# Patient Record
Sex: FEMALE | Race: WHITE | HISPANIC OR LATINO | Employment: FULL TIME | URBAN - METROPOLITAN AREA
[De-identification: names, ages, dates, MRNs, and addresses within clinical notes are randomized per-mention and may not be internally consistent; named-entity substitution may affect disease eponyms.]

---

## 2017-02-17 ENCOUNTER — COMMUNICATION - HEALTHEAST (OUTPATIENT)
Dept: FAMILY MEDICINE | Facility: CLINIC | Age: 27
End: 2017-02-17

## 2017-02-25 ENCOUNTER — COMMUNICATION - HEALTHEAST (OUTPATIENT)
Dept: FAMILY MEDICINE | Facility: CLINIC | Age: 27
End: 2017-02-25

## 2017-02-27 ENCOUNTER — COMMUNICATION - HEALTHEAST (OUTPATIENT)
Dept: FAMILY MEDICINE | Facility: CLINIC | Age: 27
End: 2017-02-27

## 2017-02-27 ENCOUNTER — AMBULATORY - HEALTHEAST (OUTPATIENT)
Dept: FAMILY MEDICINE | Facility: CLINIC | Age: 27
End: 2017-02-27

## 2017-02-27 DIAGNOSIS — F98.8 ADD (ATTENTION DEFICIT DISORDER): ICD-10-CM

## 2017-02-27 DIAGNOSIS — Z00.00 HEALTH CARE MAINTENANCE: ICD-10-CM

## 2017-02-28 ENCOUNTER — COMMUNICATION - HEALTHEAST (OUTPATIENT)
Dept: FAMILY MEDICINE | Facility: CLINIC | Age: 27
End: 2017-02-28

## 2017-02-28 DIAGNOSIS — Z00.00 HEALTH CARE MAINTENANCE: ICD-10-CM

## 2017-03-01 ENCOUNTER — AMBULATORY - HEALTHEAST (OUTPATIENT)
Dept: FAMILY MEDICINE | Facility: CLINIC | Age: 27
End: 2017-03-01

## 2017-03-02 ENCOUNTER — OFFICE VISIT - HEALTHEAST (OUTPATIENT)
Dept: BEHAVIORAL HEALTH | Facility: CLINIC | Age: 27
End: 2017-03-02

## 2017-03-02 DIAGNOSIS — Z91.199 FAILURE TO ATTEND APPOINTMENT: ICD-10-CM

## 2017-03-04 ENCOUNTER — RECORDS - HEALTHEAST (OUTPATIENT)
Dept: ADMINISTRATIVE | Facility: OTHER | Age: 27
End: 2017-03-04

## 2017-04-03 ENCOUNTER — COMMUNICATION - HEALTHEAST (OUTPATIENT)
Dept: FAMILY MEDICINE | Facility: CLINIC | Age: 27
End: 2017-04-03

## 2017-04-09 ENCOUNTER — COMMUNICATION - HEALTHEAST (OUTPATIENT)
Dept: SCHEDULING | Facility: CLINIC | Age: 27
End: 2017-04-09

## 2017-04-11 ENCOUNTER — OFFICE VISIT - HEALTHEAST (OUTPATIENT)
Dept: FAMILY MEDICINE | Facility: CLINIC | Age: 27
End: 2017-04-11

## 2017-04-11 DIAGNOSIS — F15.11 NONDEPENDENT AMPHETAMINE OR RELATED ACTING SYMPATHOMIMETIC ABUSE, IN REMISSION (H): ICD-10-CM

## 2017-04-11 DIAGNOSIS — R14.0 ABDOMINAL BLOATING: ICD-10-CM

## 2017-04-11 DIAGNOSIS — R19.7 DIARRHEA: ICD-10-CM

## 2017-04-11 DIAGNOSIS — F98.8 ADD (ATTENTION DEFICIT DISORDER): ICD-10-CM

## 2017-04-11 DIAGNOSIS — N94.9 FEMALE GENITAL SYMPTOMS: ICD-10-CM

## 2017-04-11 DIAGNOSIS — R10.11 RIGHT UPPER QUADRANT ABDOMINAL PAIN: ICD-10-CM

## 2017-04-11 DIAGNOSIS — N92.1 METRORRHAGIA: ICD-10-CM

## 2017-04-11 DIAGNOSIS — Z77.9 EXPOSURE TO POTENTIALLY HARMFUL ENTITY: ICD-10-CM

## 2017-04-12 ENCOUNTER — COMMUNICATION - HEALTHEAST (OUTPATIENT)
Dept: FAMILY MEDICINE | Facility: CLINIC | Age: 27
End: 2017-04-12

## 2017-04-13 LAB
GLIADIN IGA SER-ACNC: 0.4 U/ML
GLIADIN IGG SER-ACNC: <0.4 U/ML
IGA SERPL-MCNC: 138 MG/DL (ref 65–400)
TTG IGA SER-ACNC: 0.2 U/ML
TTG IGG SER-ACNC: 0.8 U/ML

## 2017-04-15 ENCOUNTER — COMMUNICATION - HEALTHEAST (OUTPATIENT)
Dept: FAMILY MEDICINE | Facility: CLINIC | Age: 27
End: 2017-04-15

## 2017-04-15 ENCOUNTER — AMBULATORY - HEALTHEAST (OUTPATIENT)
Dept: FAMILY MEDICINE | Facility: CLINIC | Age: 27
End: 2017-04-15

## 2017-04-19 ENCOUNTER — HOSPITAL ENCOUNTER (OUTPATIENT)
Dept: ULTRASOUND IMAGING | Facility: CLINIC | Age: 27
Discharge: HOME | End: 2017-04-19
Attending: FAMILY MEDICINE

## 2017-04-19 DIAGNOSIS — R14.0 ABDOMINAL BLOATING: ICD-10-CM

## 2017-04-19 DIAGNOSIS — R19.7 DIARRHEA: ICD-10-CM

## 2017-04-21 ENCOUNTER — COMMUNICATION - HEALTHEAST (OUTPATIENT)
Dept: SCHEDULING | Facility: CLINIC | Age: 27
End: 2017-04-21

## 2017-04-24 ENCOUNTER — COMMUNICATION - HEALTHEAST (OUTPATIENT)
Dept: FAMILY MEDICINE | Facility: CLINIC | Age: 27
End: 2017-04-24

## 2017-05-04 ENCOUNTER — COMMUNICATION - HEALTHEAST (OUTPATIENT)
Dept: FAMILY MEDICINE | Facility: CLINIC | Age: 27
End: 2017-05-04

## 2017-05-05 ENCOUNTER — COMMUNICATION - HEALTHEAST (OUTPATIENT)
Dept: FAMILY MEDICINE | Facility: CLINIC | Age: 27
End: 2017-05-05

## 2017-05-08 ENCOUNTER — RECORDS - HEALTHEAST (OUTPATIENT)
Dept: ADMINISTRATIVE | Facility: OTHER | Age: 27
End: 2017-05-08

## 2017-05-09 ENCOUNTER — AMBULATORY - HEALTHEAST (OUTPATIENT)
Dept: FAMILY MEDICINE | Facility: CLINIC | Age: 27
End: 2017-05-09

## 2017-05-14 ENCOUNTER — COMMUNICATION - HEALTHEAST (OUTPATIENT)
Dept: FAMILY MEDICINE | Facility: CLINIC | Age: 27
End: 2017-05-14

## 2017-05-15 ENCOUNTER — AMBULATORY - HEALTHEAST (OUTPATIENT)
Dept: FAMILY MEDICINE | Facility: CLINIC | Age: 27
End: 2017-05-15

## 2017-05-15 ENCOUNTER — COMMUNICATION - HEALTHEAST (OUTPATIENT)
Dept: FAMILY MEDICINE | Facility: CLINIC | Age: 27
End: 2017-05-15

## 2017-05-15 DIAGNOSIS — S99.929A TOE INJURY: ICD-10-CM

## 2017-06-08 ENCOUNTER — OFFICE VISIT - HEALTHEAST (OUTPATIENT)
Dept: BEHAVIORAL HEALTH | Facility: CLINIC | Age: 27
End: 2017-06-08

## 2017-06-08 DIAGNOSIS — F31.9 BIPOLAR DISORDER, UNSPECIFIED (H): ICD-10-CM

## 2017-06-08 DIAGNOSIS — F41.1 GENERALIZED ANXIETY DISORDER: ICD-10-CM

## 2017-06-20 ENCOUNTER — COMMUNICATION - HEALTHEAST (OUTPATIENT)
Dept: FAMILY MEDICINE | Facility: CLINIC | Age: 27
End: 2017-06-20

## 2017-06-20 ENCOUNTER — AMBULATORY - HEALTHEAST (OUTPATIENT)
Dept: FAMILY MEDICINE | Facility: CLINIC | Age: 27
End: 2017-06-20

## 2017-06-22 ENCOUNTER — COMMUNICATION - HEALTHEAST (OUTPATIENT)
Dept: FAMILY MEDICINE | Facility: CLINIC | Age: 27
End: 2017-06-22

## 2017-06-23 ENCOUNTER — COMMUNICATION - HEALTHEAST (OUTPATIENT)
Dept: FAMILY MEDICINE | Facility: CLINIC | Age: 27
End: 2017-06-23

## 2017-06-24 ENCOUNTER — RECORDS - HEALTHEAST (OUTPATIENT)
Dept: ADMINISTRATIVE | Facility: OTHER | Age: 27
End: 2017-06-24

## 2017-06-27 ENCOUNTER — AMBULATORY - HEALTHEAST (OUTPATIENT)
Dept: FAMILY MEDICINE | Facility: CLINIC | Age: 27
End: 2017-06-27

## 2017-07-03 ENCOUNTER — COMMUNICATION - HEALTHEAST (OUTPATIENT)
Dept: FAMILY MEDICINE | Facility: CLINIC | Age: 27
End: 2017-07-03

## 2017-07-06 ENCOUNTER — AMBULATORY - HEALTHEAST (OUTPATIENT)
Dept: FAMILY MEDICINE | Facility: CLINIC | Age: 27
End: 2017-07-06

## 2017-07-11 ENCOUNTER — COMMUNICATION - HEALTHEAST (OUTPATIENT)
Dept: FAMILY MEDICINE | Facility: CLINIC | Age: 27
End: 2017-07-11

## 2017-07-13 ENCOUNTER — COMMUNICATION - HEALTHEAST (OUTPATIENT)
Dept: FAMILY MEDICINE | Facility: CLINIC | Age: 27
End: 2017-07-13

## 2017-07-14 ENCOUNTER — COMMUNICATION - HEALTHEAST (OUTPATIENT)
Dept: FAMILY MEDICINE | Facility: CLINIC | Age: 27
End: 2017-07-14

## 2017-07-15 ENCOUNTER — COMMUNICATION - HEALTHEAST (OUTPATIENT)
Dept: FAMILY MEDICINE | Facility: CLINIC | Age: 27
End: 2017-07-15

## 2017-07-31 ENCOUNTER — RECORDS - HEALTHEAST (OUTPATIENT)
Dept: ADMINISTRATIVE | Facility: OTHER | Age: 27
End: 2017-07-31

## 2017-08-02 ENCOUNTER — COMMUNICATION - HEALTHEAST (OUTPATIENT)
Dept: FAMILY MEDICINE | Facility: CLINIC | Age: 27
End: 2017-08-02

## 2017-08-09 ENCOUNTER — COMMUNICATION - HEALTHEAST (OUTPATIENT)
Dept: FAMILY MEDICINE | Facility: CLINIC | Age: 27
End: 2017-08-09

## 2017-08-11 ENCOUNTER — AMBULATORY - HEALTHEAST (OUTPATIENT)
Dept: FAMILY MEDICINE | Facility: CLINIC | Age: 27
End: 2017-08-11

## 2017-08-14 ENCOUNTER — OFFICE VISIT - HEALTHEAST (OUTPATIENT)
Dept: PODIATRY | Facility: CLINIC | Age: 27
End: 2017-08-14

## 2017-08-14 DIAGNOSIS — L60.0 INGROWN TOENAIL: ICD-10-CM

## 2017-08-25 ENCOUNTER — COMMUNICATION - HEALTHEAST (OUTPATIENT)
Dept: SCHEDULING | Facility: CLINIC | Age: 27
End: 2017-08-25

## 2017-08-28 ENCOUNTER — OFFICE VISIT - HEALTHEAST (OUTPATIENT)
Dept: FAMILY MEDICINE | Facility: CLINIC | Age: 27
End: 2017-08-28

## 2017-08-28 DIAGNOSIS — Z13.9 SCREENING: ICD-10-CM

## 2017-08-28 DIAGNOSIS — B96.89 BV (BACTERIAL VAGINOSIS): ICD-10-CM

## 2017-08-28 DIAGNOSIS — Z20.818 STREP THROAT EXPOSURE: ICD-10-CM

## 2017-08-28 DIAGNOSIS — N76.0 BV (BACTERIAL VAGINOSIS): ICD-10-CM

## 2017-08-28 DIAGNOSIS — R30.0 DYSURIA: ICD-10-CM

## 2017-08-28 LAB — HIV 1+2 AB+HIV1 P24 AG SERPL QL IA: NEGATIVE

## 2017-08-29 LAB — SYPHILIS RPR SCREEN - HISTORICAL: NORMAL

## 2017-08-30 ENCOUNTER — COMMUNICATION - HEALTHEAST (OUTPATIENT)
Dept: SCHEDULING | Facility: CLINIC | Age: 27
End: 2017-08-30

## 2017-08-30 ENCOUNTER — COMMUNICATION - HEALTHEAST (OUTPATIENT)
Dept: HEALTH INFORMATION MANAGEMENT | Facility: CLINIC | Age: 27
End: 2017-08-30

## 2017-08-30 LAB
HAV IGM SERPL QL IA: NEGATIVE
HBV CORE IGM SERPL QL IA: NEGATIVE
HBV SURFACE AG SERPL QL IA: NEGATIVE
HCV AB SERPL QL IA: NEGATIVE

## 2017-09-01 ENCOUNTER — OFFICE VISIT - HEALTHEAST (OUTPATIENT)
Dept: FAMILY MEDICINE | Facility: CLINIC | Age: 27
End: 2017-09-01

## 2017-09-01 DIAGNOSIS — R10.9 ABDOMINAL PAIN: ICD-10-CM

## 2017-09-01 DIAGNOSIS — R30.0 DYSURIA: ICD-10-CM

## 2017-09-01 DIAGNOSIS — J02.9 SORE THROAT: ICD-10-CM

## 2017-09-01 ASSESSMENT — MIFFLIN-ST. JEOR: SCORE: 1165.41

## 2017-09-02 ENCOUNTER — HOSPITAL ENCOUNTER (OUTPATIENT)
Dept: ULTRASOUND IMAGING | Facility: HOSPITAL | Age: 27
Discharge: HOME | End: 2017-09-02
Attending: FAMILY MEDICINE

## 2017-09-02 DIAGNOSIS — R10.9 ABDOMINAL PAIN: ICD-10-CM

## 2017-09-05 ENCOUNTER — COMMUNICATION - HEALTHEAST (OUTPATIENT)
Dept: SCHEDULING | Facility: CLINIC | Age: 27
End: 2017-09-05

## 2017-09-12 ENCOUNTER — COMMUNICATION - HEALTHEAST (OUTPATIENT)
Dept: SCHEDULING | Facility: CLINIC | Age: 27
End: 2017-09-12

## 2017-09-19 ENCOUNTER — OFFICE VISIT - HEALTHEAST (OUTPATIENT)
Dept: FAMILY MEDICINE | Facility: CLINIC | Age: 27
End: 2017-09-19

## 2017-09-19 DIAGNOSIS — R79.89 ABNORMAL TSH: ICD-10-CM

## 2017-09-19 DIAGNOSIS — R93.89 ABNORMAL IMAGING OF THYROID: ICD-10-CM

## 2017-09-19 DIAGNOSIS — K58.0 IRRITABLE BOWEL SYNDROME WITH DIARRHEA: ICD-10-CM

## 2017-09-19 DIAGNOSIS — N92.6 LATE PERIOD: ICD-10-CM

## 2017-09-19 DIAGNOSIS — G44.1 OTHER VASCULAR HEADACHE: ICD-10-CM

## 2017-09-21 ENCOUNTER — COMMUNICATION - HEALTHEAST (OUTPATIENT)
Dept: FAMILY MEDICINE | Facility: CLINIC | Age: 27
End: 2017-09-21

## 2017-09-22 ENCOUNTER — AMBULATORY - HEALTHEAST (OUTPATIENT)
Dept: FAMILY MEDICINE | Facility: CLINIC | Age: 27
End: 2017-09-22

## 2017-09-22 DIAGNOSIS — K58.9 IBS (IRRITABLE BOWEL SYNDROME): ICD-10-CM

## 2017-09-22 DIAGNOSIS — R14.0 BLOATING: ICD-10-CM

## 2017-09-25 ENCOUNTER — RECORDS - HEALTHEAST (OUTPATIENT)
Dept: ADMINISTRATIVE | Facility: OTHER | Age: 27
End: 2017-09-25

## 2017-09-25 LAB
MISCELLANEOUS TEST DEPT. - HE HISTORICAL: NORMAL
PERFORMING LAB: NORMAL
SPECIMEN STATUS: NORMAL
TEST NAME: NORMAL

## 2017-09-26 ENCOUNTER — COMMUNICATION - HEALTHEAST (OUTPATIENT)
Dept: FAMILY MEDICINE | Facility: CLINIC | Age: 27
End: 2017-09-26

## 2017-10-11 ENCOUNTER — OFFICE VISIT - HEALTHEAST (OUTPATIENT)
Dept: BEHAVIORAL HEALTH | Facility: CLINIC | Age: 27
End: 2017-10-11

## 2017-10-11 DIAGNOSIS — Z91.199 FAILURE TO ATTEND APPOINTMENT: ICD-10-CM

## 2017-10-12 ENCOUNTER — COMMUNICATION - HEALTHEAST (OUTPATIENT)
Dept: FAMILY MEDICINE | Facility: CLINIC | Age: 27
End: 2017-10-12

## 2017-10-17 ENCOUNTER — OFFICE VISIT - HEALTHEAST (OUTPATIENT)
Dept: BEHAVIORAL HEALTH | Facility: CLINIC | Age: 27
End: 2017-10-17

## 2017-10-17 ENCOUNTER — AMBULATORY - HEALTHEAST (OUTPATIENT)
Dept: BEHAVIORAL HEALTH | Facility: CLINIC | Age: 27
End: 2017-10-17

## 2017-10-17 DIAGNOSIS — F31.9 BIPOLAR I DISORDER (H): ICD-10-CM

## 2017-10-17 DIAGNOSIS — F41.1 GENERALIZED ANXIETY DISORDER: ICD-10-CM

## 2017-10-18 ENCOUNTER — COMMUNICATION - HEALTHEAST (OUTPATIENT)
Dept: FAMILY MEDICINE | Facility: CLINIC | Age: 27
End: 2017-10-18

## 2017-10-28 ENCOUNTER — COMMUNICATION - HEALTHEAST (OUTPATIENT)
Dept: FAMILY MEDICINE | Facility: CLINIC | Age: 27
End: 2017-10-28

## 2017-11-06 ENCOUNTER — AMBULATORY - HEALTHEAST (OUTPATIENT)
Dept: FAMILY MEDICINE | Facility: CLINIC | Age: 27
End: 2017-11-06

## 2017-11-06 DIAGNOSIS — R10.13 POSTPRANDIAL EPIGASTRIC PAIN: ICD-10-CM

## 2017-11-06 DIAGNOSIS — R14.0 BLOATING: ICD-10-CM

## 2017-11-06 DIAGNOSIS — R11.0 NAUSEA: ICD-10-CM

## 2017-11-20 ENCOUNTER — COMMUNICATION - HEALTHEAST (OUTPATIENT)
Dept: FAMILY MEDICINE | Facility: CLINIC | Age: 27
End: 2017-11-20

## 2017-11-22 ENCOUNTER — COMMUNICATION - HEALTHEAST (OUTPATIENT)
Dept: FAMILY MEDICINE | Facility: CLINIC | Age: 27
End: 2017-11-22

## 2017-12-03 ENCOUNTER — COMMUNICATION - HEALTHEAST (OUTPATIENT)
Dept: FAMILY MEDICINE | Facility: CLINIC | Age: 27
End: 2017-12-03

## 2017-12-04 ENCOUNTER — COMMUNICATION - HEALTHEAST (OUTPATIENT)
Dept: FAMILY MEDICINE | Facility: CLINIC | Age: 27
End: 2017-12-04

## 2017-12-04 ENCOUNTER — OFFICE VISIT - HEALTHEAST (OUTPATIENT)
Dept: FAMILY MEDICINE | Facility: CLINIC | Age: 27
End: 2017-12-04

## 2017-12-04 DIAGNOSIS — O09.91 HIGH-RISK PREGNANCY IN FIRST TRIMESTER: ICD-10-CM

## 2017-12-04 DIAGNOSIS — Z22.330 CARRIER OF GROUP B STREPTOCOCCUS: ICD-10-CM

## 2017-12-04 DIAGNOSIS — Z20.9 EXPOSURE TO POTENTIAL INFECTION: ICD-10-CM

## 2017-12-04 DIAGNOSIS — N91.2 AMENORRHEA: ICD-10-CM

## 2017-12-05 ENCOUNTER — AMBULATORY - HEALTHEAST (OUTPATIENT)
Dept: FAMILY MEDICINE | Facility: CLINIC | Age: 27
End: 2017-12-05

## 2017-12-07 ENCOUNTER — COMMUNICATION - HEALTHEAST (OUTPATIENT)
Dept: FAMILY MEDICINE | Facility: CLINIC | Age: 27
End: 2017-12-07

## 2017-12-08 ENCOUNTER — AMBULATORY - HEALTHEAST (OUTPATIENT)
Dept: LAB | Facility: CLINIC | Age: 27
End: 2017-12-08

## 2017-12-08 ENCOUNTER — COMMUNICATION - HEALTHEAST (OUTPATIENT)
Dept: FAMILY MEDICINE | Facility: CLINIC | Age: 27
End: 2017-12-08

## 2017-12-08 DIAGNOSIS — R14.0 ABDOMINAL BLOATING: ICD-10-CM

## 2017-12-08 DIAGNOSIS — R19.7 DIARRHEA: ICD-10-CM

## 2017-12-08 DIAGNOSIS — N91.2 AMENORRHEA: ICD-10-CM

## 2017-12-11 ENCOUNTER — AMBULATORY - HEALTHEAST (OUTPATIENT)
Dept: FAMILY MEDICINE | Facility: CLINIC | Age: 27
End: 2017-12-11

## 2017-12-11 ENCOUNTER — COMMUNICATION - HEALTHEAST (OUTPATIENT)
Dept: FAMILY MEDICINE | Facility: CLINIC | Age: 27
End: 2017-12-11

## 2017-12-11 DIAGNOSIS — R10.9 ABDOMINAL PAIN: ICD-10-CM

## 2017-12-11 DIAGNOSIS — Z34.90 PREGNANCY: ICD-10-CM

## 2017-12-12 ENCOUNTER — RECORDS - HEALTHEAST (OUTPATIENT)
Dept: ADMINISTRATIVE | Facility: OTHER | Age: 27
End: 2017-12-12

## 2017-12-15 ENCOUNTER — AMBULATORY - HEALTHEAST (OUTPATIENT)
Dept: LAB | Facility: CLINIC | Age: 27
End: 2017-12-15

## 2017-12-15 DIAGNOSIS — Z34.90 PREGNANCY: ICD-10-CM

## 2017-12-15 DIAGNOSIS — R10.9 ABDOMINAL PAIN: ICD-10-CM

## 2017-12-16 ENCOUNTER — COMMUNICATION - HEALTHEAST (OUTPATIENT)
Dept: FAMILY MEDICINE | Facility: CLINIC | Age: 27
End: 2017-12-16

## 2017-12-18 ENCOUNTER — COMMUNICATION - HEALTHEAST (OUTPATIENT)
Dept: FAMILY MEDICINE | Facility: CLINIC | Age: 27
End: 2017-12-18

## 2017-12-18 ENCOUNTER — HOSPITAL ENCOUNTER (OUTPATIENT)
Dept: ULTRASOUND IMAGING | Facility: CLINIC | Age: 27
Discharge: HOME | End: 2017-12-18
Attending: FAMILY MEDICINE

## 2017-12-18 DIAGNOSIS — R10.9 ABDOMINAL PAIN: ICD-10-CM

## 2017-12-18 DIAGNOSIS — Z34.90 PREGNANCY: ICD-10-CM

## 2017-12-20 ENCOUNTER — OFFICE VISIT - HEALTHEAST (OUTPATIENT)
Dept: FAMILY MEDICINE | Facility: CLINIC | Age: 27
End: 2017-12-20

## 2017-12-20 ENCOUNTER — COMMUNICATION - HEALTHEAST (OUTPATIENT)
Dept: FAMILY MEDICINE | Facility: CLINIC | Age: 27
End: 2017-12-20

## 2017-12-20 DIAGNOSIS — Z3A.01 LESS THAN 8 WEEKS GESTATION OF PREGNANCY: ICD-10-CM

## 2017-12-20 DIAGNOSIS — R30.9 PAIN WITH URINATION: ICD-10-CM

## 2017-12-20 DIAGNOSIS — N89.8 VAGINAL DISCHARGE: ICD-10-CM

## 2017-12-22 ENCOUNTER — COMMUNICATION - HEALTHEAST (OUTPATIENT)
Dept: FAMILY MEDICINE | Facility: CLINIC | Age: 27
End: 2017-12-22

## 2017-12-22 ENCOUNTER — COMMUNICATION - HEALTHEAST (OUTPATIENT)
Dept: SCHEDULING | Facility: CLINIC | Age: 27
End: 2017-12-22

## 2017-12-28 ENCOUNTER — OFFICE VISIT - HEALTHEAST (OUTPATIENT)
Dept: FAMILY MEDICINE | Facility: CLINIC | Age: 27
End: 2017-12-28

## 2017-12-28 DIAGNOSIS — R30.0 DYSURIA: ICD-10-CM

## 2017-12-28 DIAGNOSIS — O21.0 HYPEREMESIS GRAVIDARUM: ICD-10-CM

## 2017-12-28 ASSESSMENT — MIFFLIN-ST. JEOR: SCORE: 1172.21

## 2017-12-29 ENCOUNTER — COMMUNICATION - HEALTHEAST (OUTPATIENT)
Dept: FAMILY MEDICINE | Facility: CLINIC | Age: 27
End: 2017-12-29

## 2018-01-01 ENCOUNTER — APPOINTMENT (OUTPATIENT)
Dept: LAB | Facility: CLINIC | Age: 28
End: 2018-01-01
Attending: FAMILY MEDICINE
Payer: COMMERCIAL

## 2018-01-02 ENCOUNTER — COMMUNICATION - HEALTHEAST (OUTPATIENT)
Dept: FAMILY MEDICINE | Facility: CLINIC | Age: 28
End: 2018-01-02

## 2018-01-03 ENCOUNTER — RECORDS - HEALTHEAST (OUTPATIENT)
Dept: ADMINISTRATIVE | Facility: OTHER | Age: 28
End: 2018-01-03

## 2018-01-05 ENCOUNTER — COMMUNICATION - HEALTHEAST (OUTPATIENT)
Dept: FAMILY MEDICINE | Facility: CLINIC | Age: 28
End: 2018-01-05

## 2018-01-08 ENCOUNTER — AMBULATORY - HEALTHEAST (OUTPATIENT)
Dept: FAMILY MEDICINE | Facility: CLINIC | Age: 28
End: 2018-01-08

## 2018-01-10 ENCOUNTER — COMMUNICATION - HEALTHEAST (OUTPATIENT)
Dept: FAMILY MEDICINE | Facility: CLINIC | Age: 28
End: 2018-01-10

## 2018-01-11 ENCOUNTER — OFFICE VISIT - HEALTHEAST (OUTPATIENT)
Dept: FAMILY MEDICINE | Facility: CLINIC | Age: 28
End: 2018-01-11

## 2018-01-11 ENCOUNTER — COMMUNICATION - HEALTHEAST (OUTPATIENT)
Dept: FAMILY MEDICINE | Facility: CLINIC | Age: 28
End: 2018-01-11

## 2018-01-11 DIAGNOSIS — Z3A.09 9 WEEKS GESTATION OF PREGNANCY: ICD-10-CM

## 2018-01-11 DIAGNOSIS — J02.9 PHARYNGITIS: ICD-10-CM

## 2018-01-11 DIAGNOSIS — R07.0 THROAT PAIN: ICD-10-CM

## 2018-01-11 DIAGNOSIS — Z20.818 EXPOSURE TO STREP THROAT: ICD-10-CM

## 2018-01-11 DIAGNOSIS — Z20.828 EXPOSURE TO INFLUENZA: ICD-10-CM

## 2018-01-11 LAB — DEPRECATED S PYO AG THROAT QL EIA: NORMAL

## 2018-01-12 ENCOUNTER — COMMUNICATION - HEALTHEAST (OUTPATIENT)
Dept: FAMILY MEDICINE | Facility: CLINIC | Age: 28
End: 2018-01-12

## 2018-01-12 ENCOUNTER — RECORDS - HEALTHEAST (OUTPATIENT)
Dept: ADMINISTRATIVE | Facility: OTHER | Age: 28
End: 2018-01-12

## 2018-01-12 LAB — GROUP A STREP BY PCR: NORMAL

## 2018-01-16 ENCOUNTER — RECORDS - HEALTHEAST (OUTPATIENT)
Dept: ADMINISTRATIVE | Facility: OTHER | Age: 28
End: 2018-01-16

## 2018-01-18 ENCOUNTER — COMMUNICATION - HEALTHEAST (OUTPATIENT)
Dept: FAMILY MEDICINE | Facility: CLINIC | Age: 28
End: 2018-01-18

## 2018-01-18 ENCOUNTER — AMBULATORY - HEALTHEAST (OUTPATIENT)
Dept: FAMILY MEDICINE | Facility: CLINIC | Age: 28
End: 2018-01-18

## 2018-01-18 DIAGNOSIS — O09.92 HIGH-RISK PREGNANCY IN SECOND TRIMESTER: ICD-10-CM

## 2018-01-26 ENCOUNTER — COMMUNICATION - HEALTHEAST (OUTPATIENT)
Dept: FAMILY MEDICINE | Facility: CLINIC | Age: 28
End: 2018-01-26

## 2018-01-30 ENCOUNTER — RECORDS - HEALTHEAST (OUTPATIENT)
Dept: ADMINISTRATIVE | Facility: OTHER | Age: 28
End: 2018-01-30

## 2018-01-31 ENCOUNTER — COMMUNICATION - HEALTHEAST (OUTPATIENT)
Dept: FAMILY MEDICINE | Facility: CLINIC | Age: 28
End: 2018-01-31

## 2018-01-31 ENCOUNTER — COMMUNICATION - HEALTHEAST (OUTPATIENT)
Dept: SCHEDULING | Facility: CLINIC | Age: 28
End: 2018-01-31

## 2018-02-01 ENCOUNTER — COMMUNICATION - HEALTHEAST (OUTPATIENT)
Dept: FAMILY MEDICINE | Facility: CLINIC | Age: 28
End: 2018-02-01

## 2018-02-02 ENCOUNTER — COMMUNICATION - HEALTHEAST (OUTPATIENT)
Dept: FAMILY MEDICINE | Facility: CLINIC | Age: 28
End: 2018-02-02

## 2018-02-04 ENCOUNTER — COMMUNICATION - HEALTHEAST (OUTPATIENT)
Dept: FAMILY MEDICINE | Facility: CLINIC | Age: 28
End: 2018-02-04

## 2018-02-05 ENCOUNTER — RECORDS - HEALTHEAST (OUTPATIENT)
Dept: ADMINISTRATIVE | Facility: OTHER | Age: 28
End: 2018-02-05

## 2018-02-05 ENCOUNTER — COMMUNICATION - HEALTHEAST (OUTPATIENT)
Dept: FAMILY MEDICINE | Facility: CLINIC | Age: 28
End: 2018-02-05

## 2018-02-05 DIAGNOSIS — Z34.90 PREGNANCY: ICD-10-CM

## 2018-02-06 ENCOUNTER — COMMUNICATION - HEALTHEAST (OUTPATIENT)
Dept: FAMILY MEDICINE | Facility: CLINIC | Age: 28
End: 2018-02-06

## 2018-02-09 ENCOUNTER — PRENATAL OFFICE VISIT - HEALTHEAST (OUTPATIENT)
Dept: FAMILY MEDICINE | Facility: CLINIC | Age: 28
End: 2018-02-09

## 2018-02-09 DIAGNOSIS — O09.892 SUPERVISION OF OTHER HIGH RISK PREGNANCIES, SECOND TRIMESTER: ICD-10-CM

## 2018-02-09 DIAGNOSIS — B37.31 CANDIDA VAGINITIS: ICD-10-CM

## 2018-02-09 LAB
ALBUMIN UR-MCNC: NEGATIVE MG/DL
AMPHETAMINES UR QL SCN: ABNORMAL
APPEARANCE UR: CLEAR
BARBITURATES UR QL: ABNORMAL
BASOPHILS # BLD AUTO: 0 THOU/UL (ref 0–0.2)
BASOPHILS NFR BLD AUTO: 1 % (ref 0–2)
BENZODIAZ UR QL: ABNORMAL
BILIRUB UR QL STRIP: NEGATIVE
CANNABINOIDS UR QL SCN: ABNORMAL
COCAINE UR QL: ABNORMAL
COLOR UR AUTO: YELLOW
CREAT UR-MCNC: 120.3 MG/DL
EOSINOPHIL # BLD AUTO: 0.1 THOU/UL (ref 0–0.4)
EOSINOPHIL NFR BLD AUTO: 1 % (ref 0–6)
ERYTHROCYTE [DISTWIDTH] IN BLOOD BY AUTOMATED COUNT: 14 % (ref 11–14.5)
GLUCOSE UR STRIP-MCNC: NEGATIVE MG/DL
HCT VFR BLD AUTO: 35.7 % (ref 35–47)
HGB BLD-MCNC: 12.2 G/DL (ref 12–16)
HGB UR QL STRIP: ABNORMAL
HIV 1+2 AB+HIV1 P24 AG SERPL QL IA: NEGATIVE
KETONES UR STRIP-MCNC: NEGATIVE MG/DL
LEUKOCYTE ESTERASE UR QL STRIP: NEGATIVE
LYMPHOCYTES # BLD AUTO: 1.9 THOU/UL (ref 0.8–4.4)
LYMPHOCYTES NFR BLD AUTO: 28 % (ref 20–40)
MCH RBC QN AUTO: 31 PG (ref 27–34)
MCHC RBC AUTO-ENTMCNC: 34.2 G/DL (ref 32–36)
MCV RBC AUTO: 91 FL (ref 80–100)
MONOCYTES # BLD AUTO: 0.4 THOU/UL (ref 0–0.9)
MONOCYTES NFR BLD AUTO: 7 % (ref 2–10)
NEUTROPHILS # BLD AUTO: 4.2 THOU/UL (ref 2–7.7)
NEUTROPHILS NFR BLD AUTO: 63 % (ref 50–70)
NITRATE UR QL: NEGATIVE
OPIATES UR QL SCN: ABNORMAL
OXYCODONE UR QL: ABNORMAL
PCP UR QL SCN: ABNORMAL
PH UR STRIP: 5.5 [PH] (ref 4.5–8)
PLATELET # BLD AUTO: 285 THOU/UL (ref 140–440)
PMV BLD AUTO: 9.9 FL (ref 8.5–12.5)
RBC # BLD AUTO: 3.94 MILL/UL (ref 3.8–5.4)
SP GR UR STRIP: 1.02 (ref 1–1.03)
UROBILINOGEN UR STRIP-ACNC: ABNORMAL
WBC: 6.6 THOU/UL (ref 4–11)

## 2018-02-10 LAB
ANTIBODY SCREEN: NEGATIVE
BACTERIA SPEC CULT: NO GROWTH
CLUE CELLS: ABNORMAL
HBV SURFACE AG SERPL QL IA: NEGATIVE
TRICHOMONAS, WET PREP: ABNORMAL
YEAST, WET PREP: ABNORMAL

## 2018-02-12 ENCOUNTER — COMMUNICATION - HEALTHEAST (OUTPATIENT)
Dept: SCHEDULING | Facility: CLINIC | Age: 28
End: 2018-02-12

## 2018-02-12 LAB
ABO/RH(D): NORMAL
ABORH REPEAT: NORMAL
C TRACH DNA SPEC QL PROBE+SIG AMP: NEGATIVE
HPV SOURCE: ABNORMAL
HUMAN PAPILLOMA VIRUS 16 DNA: NEGATIVE
HUMAN PAPILLOMA VIRUS 18 DNA: NEGATIVE
HUMAN PAPILLOMA VIRUS FINAL DIAGNOSIS: ABNORMAL
HUMAN PAPILLOMA VIRUS OTHER HR: POSITIVE
N GONORRHOEA DNA SPEC QL NAA+PROBE: NEGATIVE
RUBV IGG SERPL QL IA: NORMAL
SPECIMEN DESCRIPTION: ABNORMAL
SYPHILIS RPR SCREEN - HISTORICAL: NORMAL

## 2018-02-16 ENCOUNTER — COMMUNICATION - HEALTHEAST (OUTPATIENT)
Dept: FAMILY MEDICINE | Facility: CLINIC | Age: 28
End: 2018-02-16

## 2018-02-19 LAB
BKR LAB AP ABNORMAL BLEEDING: NO
BKR LAB AP BIRTH CONTROL/HORMONES: ABNORMAL
BKR LAB AP CERVICAL APPEARANCE: NORMAL
BKR LAB AP GYN ADEQUACY: ABNORMAL
BKR LAB AP GYN INTERPRETATION: ABNORMAL
BKR LAB AP HPV REFLEX: ABNORMAL
BKR LAB AP LMP: ABNORMAL
BKR LAB AP PATIENT STATUS: ABNORMAL
BKR LAB AP PREVIOUS ABNORMAL: ABNORMAL
BKR LAB AP PREVIOUS NORMAL: 2012
HIGH RISK?: YES
PATH REPORT.COMMENTS IMP SPEC: ABNORMAL
RESULT FLAG (HE HISTORICAL CONVERSION): ABNORMAL

## 2018-03-01 ENCOUNTER — RECORDS - HEALTHEAST (OUTPATIENT)
Dept: ADMINISTRATIVE | Facility: OTHER | Age: 28
End: 2018-03-01

## 2018-03-02 ENCOUNTER — OFFICE VISIT - HEALTHEAST (OUTPATIENT)
Dept: FAMILY MEDICINE | Facility: CLINIC | Age: 28
End: 2018-03-02

## 2018-03-02 DIAGNOSIS — R50.9 FEVER: ICD-10-CM

## 2018-03-02 DIAGNOSIS — R07.0 THROAT PAIN: ICD-10-CM

## 2018-03-02 DIAGNOSIS — Z20.818 EXPOSURE TO STREP THROAT: ICD-10-CM

## 2018-03-02 DIAGNOSIS — J02.9 PHARYNGITIS: ICD-10-CM

## 2018-03-02 LAB — DEPRECATED S PYO AG THROAT QL EIA: NORMAL

## 2018-03-03 LAB — GROUP A STREP BY PCR: NORMAL

## 2018-03-05 ENCOUNTER — COMMUNICATION - HEALTHEAST (OUTPATIENT)
Dept: FAMILY MEDICINE | Facility: CLINIC | Age: 28
End: 2018-03-05

## 2018-03-07 DIAGNOSIS — H53.10 SUBJECTIVE VISUAL DISTURBANCE: Primary | ICD-10-CM

## 2018-03-08 ENCOUNTER — HOSPITAL ENCOUNTER (OUTPATIENT)
Dept: OBGYN | Facility: HOSPITAL | Age: 28
Discharge: HOME | End: 2018-03-08
Attending: OBSTETRICS & GYNECOLOGY | Admitting: OBSTETRICS & GYNECOLOGY

## 2018-03-08 LAB
ALBUMIN UR-MCNC: NEGATIVE MG/DL
CLUE CELLS: NORMAL
GLUCOSE UR STRIP-MCNC: NEGATIVE MG/DL
KETONES UR STRIP-MCNC: NEGATIVE MG/DL
RUPTURE OF FETAL MEMBRANES BY ROM PLUS: NEGATIVE
TRICHOMONAS, WET PREP: NORMAL
YEAST, WET PREP: NORMAL

## 2018-03-08 ASSESSMENT — MIFFLIN-ST. JEOR: SCORE: 1215.3

## 2018-03-11 ENCOUNTER — COMMUNICATION - HEALTHEAST (OUTPATIENT)
Dept: SCHEDULING | Facility: CLINIC | Age: 28
End: 2018-03-11

## 2018-03-12 ENCOUNTER — OFFICE VISIT (OUTPATIENT)
Dept: OPHTHALMOLOGY | Facility: CLINIC | Age: 28
End: 2018-03-12
Attending: OPHTHALMOLOGY
Payer: COMMERCIAL

## 2018-03-12 ENCOUNTER — RECORDS - HEALTHEAST (OUTPATIENT)
Dept: ADMINISTRATIVE | Facility: OTHER | Age: 28
End: 2018-03-12

## 2018-03-12 DIAGNOSIS — G43.109 MIGRAINE WITH AURA AND WITHOUT STATUS MIGRAINOSUS, NOT INTRACTABLE: ICD-10-CM

## 2018-03-12 DIAGNOSIS — H53.10 SUBJECTIVE VISUAL DISTURBANCE: ICD-10-CM

## 2018-03-12 DIAGNOSIS — H53.129 TRANSIENT VISUAL LOSS, UNSPECIFIED LATERALITY: Primary | ICD-10-CM

## 2018-03-12 PROCEDURE — G0463 HOSPITAL OUTPT CLINIC VISIT: HCPCS | Mod: ZF

## 2018-03-12 PROCEDURE — 92083 EXTENDED VISUAL FIELD XM: CPT | Mod: ZF | Performed by: OPHTHALMOLOGY

## 2018-03-12 RX ORDER — ERGOCALCIFEROL 1.25 MG/1
CAPSULE, LIQUID FILLED ORAL
COMMUNITY
Start: 2016-10-06 | End: 2021-08-15

## 2018-03-12 RX ORDER — FOLIC ACID 1 MG/1
1 TABLET ORAL
COMMUNITY
Start: 2016-09-29 | End: 2021-08-15

## 2018-03-12 RX ORDER — PYRIDOXINE HCL (VITAMIN B6) 25 MG
25 TABLET ORAL
COMMUNITY
End: 2023-09-07

## 2018-03-12 ASSESSMENT — REFRACTION_WEARINGRX
SPECS_TYPE: SVL
OS_CYLINDER: +1.50
OD_AXIS: 078
OS_AXIS: 102
OS_SPHERE: -2.00
OD_SPHERE: -1.50
OD_CYLINDER: +1.00

## 2018-03-12 ASSESSMENT — CUP TO DISC RATIO
OS_RATIO: 0.3
OD_RATIO: 0.3

## 2018-03-12 ASSESSMENT — CONF VISUAL FIELD
OS_NORMAL: 1
OD_NORMAL: 1

## 2018-03-12 ASSESSMENT — VISUAL ACUITY
OD_CC: 20/20
OS_CC+: -1
METHOD: SNELLEN - LINEAR
OS_CC: 20/25
CORRECTION_TYPE: GLASSES

## 2018-03-12 ASSESSMENT — TONOMETRY
OD_IOP_MMHG: 11
IOP_METHOD: TONOPEN
OS_IOP_MMHG: 10

## 2018-03-12 ASSESSMENT — EXTERNAL EXAM - LEFT EYE: OS_EXAM: NORMAL

## 2018-03-12 ASSESSMENT — SLIT LAMP EXAM - LIDS
COMMENTS: NORMAL
COMMENTS: NORMAL

## 2018-03-12 ASSESSMENT — EXTERNAL EXAM - RIGHT EYE: OD_EXAM: NORMAL

## 2018-03-12 NOTE — NURSING NOTE
Chief Complaints and History of Present Illnesses   Patient presents with     Follow Up For     Subjective visual disturbance (Primary Dx)     HPI    Affected eye(s):  Both   Symptoms:     Blurred vision   Decreased vision   Distorted vision   No double vision   No floaters   No flashes      Duration:  7 years   Frequency:  Constant       Do you have eye pain now?:  No      Comments:  Pt stated blurry and distorted vision along with headaches 5 X weekly, no double vision. She stated lights and smells can trigger vision lose followed by migraine over the last 2 years.  Nir Curry  1:04 PM March 12, 2018

## 2018-03-12 NOTE — PATIENT INSTRUCTIONS
You are sensitive to the light.  There is generally no known cause for this.  There is a specialized tint called FL-41 that blocks a certain wavelength of light known to cause light sensitivity. Your eyeglass lenses can be tinted with this at just about any eyeglass store but not all stores carry this tint.  You should call before visiting the store.  The store may try to substitute with a tint that they have in stock, but I would recommend against it.  There is also a company that will sell FL-41 tinted lenses online at [employer] or [employer].      Generally speaking, when you are at home, try not to wear sunglasses inside, especially the evenings.  The more you wear them, the less tolerant of light you become.  This does not apply to the FL-41 lenses since they are not that dark.  There are also smart LED light bulbs that can be controlled from your smart phone.  They can be made any color and any brightness.  You may find that there is a particular color and brightness that helps you.      There are tinted contact lenses.  These can be made using the FL-41 tint or another color.  They are very custom made and so they tend to cost hundreds of dollars.      You can also consider wearing a hat, tinting your windshield, get a shield above your cubicle, tinting the windows in your home and change the bulbs in your office.

## 2018-03-12 NOTE — PROGRESS NOTES
Assessment & Plan     Tara Sorenson is a 28 year old female with the following diagnoses:   1. Transient visual loss, unspecified laterality    2. Subjective visual disturbance    3. Migraine with aura and without status migrainosus, not intractable         Sent by [employer]. She got new glasses recently.  When she put them on, she noticed that she got a migraine.  She thinks that he vision in the LEFT eye is not quite right with the glasses.  She gets migraine with aura when she looks at light or is around certain smells.  Sunlight and fluorescent lights.  She has blackout shades at home.  She turns down all the lights in her house.  She cannot look at computer screens because of this as well.      The patient complains of photophobia. The rest of the eye examination does not explain the cause of the patient's photophobia.  The etiology of this is not well understood.  No further diagnostic tests are necessary.   I would recommend that the patient try FL-41 tinted lenses.  There are a number of frames that seem to benefit patient's with photophobia.    She can consider a smart lightbulb where one can change the color and brightness using a smartphone.  She can consider placing tint on her car and windows in he rhouse.  If that does not benefit the patient, then the patient could consider tinted contact lenses.      We also discussed prophylactic medications for her migraine.  She has not tried topiramate or botulinum toxin.  She is currently pregnant and these are contraindicated.  Finally she is concerned that the prescription is not correct in her left eye.  I told her that while she is pregnant I would not change her last glasses.  I suggested that she could return in about 6 months after she delivers.              Attending Physician Attestation:  Complete documentation of historical and exam elements from today's encounter can be found in the full encounter summary report (not reduplicated  in this progress note).  I personally obtained the chief complaint(s) and history of present illness.  I confirmed and edited as necessary the review of systems, past medical/surgical history, family history, social history, and examination findings as documented by others; and I examined the patient myself.  I personally reviewed the relevant tests, images, and reports as documented above.  I formulated and edited as necessary the assessment and plan and discussed the findings and management plan with the patient and family. - Luke Traore MD

## 2018-03-12 NOTE — LETTER
3/12/2018         RE:  :  MRN: Tara Sorenson  1990  [id number]     Dear Dr. Borck:     Thank you for asking me to see your very pleasant patient, Tara Sorenson, in neuro-ophthalmic consultation.  I would like to thank you for sending your records and I have summarized them in the history of present illness.  My assessment and plan are below.  For further details, please see my attached clinic note.          Assessment & Plan     Tara Sorenson is a 28 year old female with the following diagnoses:   1. Transient visual loss, unspecified laterality    2. Subjective visual disturbance    3. Migraine with aura and without status migrainosus, not intractable         Sent by [employer]. She got new glasses recently.  When she put them on, she noticed that she got a migraine.  She thinks that he vision in the LEFT eye is not quite right with the glasses.  She gets migraine with aura when she looks at light or is around certain smells.  Sunlight and fluorescent lights.  She has blackout shades at home.  She turns down all the lights in her house.  She cannot look at computer screens because of this as well.      The patient complains of photophobia. The rest of the eye examination does not explain the cause of the patient's photophobia.  The etiology of this is not well understood.  No further diagnostic tests are necessary.   I would recommend that the patient try FL-41 tinted lenses.  There are a number of frames that seem to benefit patient's with photophobia.    She can consider a smart lightbulb where one can change the color and brightness using a smartphone.  She can consider placing tint on her car and windows in he rhouse.  If that does not benefit the patient, then the patient could consider tinted contact lenses.      We also discussed prophylactic medications for her migraine.  She has not tried topiramate or botulinum toxin.  She is currently pregnant and these are contraindicated.   Finally she is concerned that the prescription is not correct in her left eye.  I told her that while she is pregnant I would not change her last glasses.  I suggested that she could return in about 6 months after she delivers.             Again, thank you for allowing me to participate in the care of your patient.      Sincerely,    Luke Traore MD  Professor, Neuro-Ophthalmology  Department of Ophthalmology and Visual Neurosciences  [facility]    CC: Joshua Dumont MD  [address] [address]  VIA Facsimile: [phone]     Alfonso Brock OD  [facility]  [address] [place] [address]  VIA Facsimile:  [phone]

## 2018-03-12 NOTE — MR AVS SNAPSHOT
After Visit Summary   3/12/2018    Tara Sorenson    MRN: [id number]           Patient Information     Date Of Birth          1990        Visit Information        Provider Department      3/12/2018 12:30 PM [facility]ore [facility]        Today's Diagnoses     Subjective visual disturbance          Care Instructions    You are sensitive to the light.  There is generally no known cause for this.  There is a specialized tint called FL-41 that blocks a certain wavelength of light known to cause light sensitivity. Your eyeglass lenses can be tinted with this at just about any eyeglass store but not all stores carry this tint.  You should call before visiting the store.  The store may try to substitute with a tint that they have in stock, but I would recommend against it.  There is also a company that will sell FL-41 tinted lenses online at [employer] or [employer].      Generally speaking, when you are at home, try not to wear sunglasses inside, especially the evenings.  The more you wear them, the less tolerant of light you become.  This does not apply to the FL-41 lenses since they are not that dark.  There are also smart LED light bulbs that can be controlled from your smart phone.  They can be made any color and any brightness.  You may find that there is a particular color and brightness that helps you.      There are tinted contact lenses.  These can be made using the FL-41 tint or another color.  They are very custom made and so they tend to cost hundreds of dollars.      You can also consider wearing a hat, tinting your windshield, get a shield above your cubicle, tinting the windows in your home and change the bulbs in your office.                Follow-ups after your visit        Who to contact     Please call your clinic at [phone] to:    Ask questions about your health    Make or cancel appointments    Discuss your medicines    Learn about your test  results    Speak to your doctor            Additional Information About Your Visit        [facility] Information     Getaroundt is an electronic gateway that provides easy, online access to your medical records. With OB10, you can request a clinic appointment, read your test results, renew a prescription or communicate with your care team.     To sign up for OB10 visit the website at [email or web address]   You will be asked to enter the access code listed below, as well as some personal information. Please follow the directions to create your username and password.     Your access code is: [email or web address]  Expires: 2018  7:30 AM     Your access code will  in 90 days. If you need help or a new code, please contact your [facility] or call [phone] for assistance.        Care EveryWhere ID     This is your Care EveryWhere ID. This could be used by other organizations to access your [place] medical records  [id number]         Blood Pressure from Last 3 Encounters:   12/15/14 100/64   11 106/58    Weight from Last 3 Encounters:   12/15/14 49 kg (108 lb)   11 63.7 kg (140 lb 6.4 oz)              We Performed the Following     Glaucoma Top         Primary Care Provider Office Phone # Fax #    Joshua Dumont -[phone]       [address] [address]        Equal Access to Services     [facility]: Hadii aad ku hadasho Soomaali, waaxda luqadaha, qaybta kaalmada adeegyada, ayanna barron hayamada lehman . So [facility] [phone].    ATENCIÓN: Si habla español, tiene a obrien disposición servicios gratuitos de asistencia lingüística. Llame al [phone].    We comply with applicable federal civil rights laws and [place] laws. We do not discriminate on the basis of race, color, national origin, age, disability, sex, sexual orientation, or gender identity.            Thank you!     Thank you for choosing  [facility]  for your care. Our goal is always to provide you with excellent care. Hearing back from our patients is one way we can continue to improve our services. Please take a few minutes to complete the written survey that you may receive in the mail after your visit with us. Thank you!             Your Updated Medication List - Protect others around you: Learn how to safely use, store and throw away your medicines at www.disposemymeds.org.          This list is accurate as of 3/12/18  2:29 PM.  Always use your most recent med list.                   Brand Name Dispense Instructions for use Diagnosis    [employer] PRENATAL 28-0.8 MG Tabs      Take 1 tablet by mouth        folic acid 1 MG tablet    FOLVITE     Take 1 mg by mouth        NO ACTIVE MEDICATIONS           PRENATAL/IRON Tabs      Take  by mouth.        pyridOXINE 25 MG tablet    vitamin B-6     Take 25 mg by mouth        vitamin D 46653 UNIT capsule    ERGOCALCIFEROL     TAKE ONE CAPSULE BY MOUTH ONCE A WEEK ON SUNDAYS.

## 2018-03-15 ENCOUNTER — COMMUNICATION - HEALTHEAST (OUTPATIENT)
Dept: FAMILY MEDICINE | Facility: CLINIC | Age: 28
End: 2018-03-15

## 2018-04-16 ENCOUNTER — COMMUNICATION - HEALTHEAST (OUTPATIENT)
Dept: FAMILY MEDICINE | Facility: CLINIC | Age: 28
End: 2018-04-16

## 2018-04-27 ENCOUNTER — AMBULATORY - HEALTHEAST (OUTPATIENT)
Dept: FAMILY MEDICINE | Facility: CLINIC | Age: 28
End: 2018-04-27

## 2018-04-27 ENCOUNTER — COMMUNICATION - HEALTHEAST (OUTPATIENT)
Dept: FAMILY MEDICINE | Facility: CLINIC | Age: 28
End: 2018-04-27

## 2018-05-25 ENCOUNTER — RECORDS - HEALTHEAST (OUTPATIENT)
Dept: ADMINISTRATIVE | Facility: OTHER | Age: 28
End: 2018-05-25

## 2018-08-12 ENCOUNTER — RECORDS - HEALTHEAST (OUTPATIENT)
Dept: ADMINISTRATIVE | Facility: OTHER | Age: 28
End: 2018-08-12

## 2018-08-13 ENCOUNTER — RECORDS - HEALTHEAST (OUTPATIENT)
Dept: ADMINISTRATIVE | Facility: OTHER | Age: 28
End: 2018-08-13

## 2018-08-14 ENCOUNTER — RECORDS - HEALTHEAST (OUTPATIENT)
Dept: ADMINISTRATIVE | Facility: OTHER | Age: 28
End: 2018-08-14

## 2018-08-19 ENCOUNTER — COMMUNICATION - HEALTHEAST (OUTPATIENT)
Dept: SCHEDULING | Facility: CLINIC | Age: 28
End: 2018-08-19

## 2018-08-26 ENCOUNTER — COMMUNICATION - HEALTHEAST (OUTPATIENT)
Dept: SCHEDULING | Facility: CLINIC | Age: 28
End: 2018-08-26

## 2018-08-29 ENCOUNTER — COMMUNICATION - HEALTHEAST (OUTPATIENT)
Dept: CARE COORDINATION | Facility: CLINIC | Age: 28
End: 2018-08-29

## 2018-09-02 ENCOUNTER — COMMUNICATION - HEALTHEAST (OUTPATIENT)
Dept: SCHEDULING | Facility: CLINIC | Age: 28
End: 2018-09-02

## 2018-09-04 ENCOUNTER — OFFICE VISIT - HEALTHEAST (OUTPATIENT)
Dept: FAMILY MEDICINE | Facility: CLINIC | Age: 28
End: 2018-09-04

## 2018-09-04 DIAGNOSIS — M50.90 CERVICAL DISC DISORDER: ICD-10-CM

## 2018-09-04 DIAGNOSIS — R20.2 PARESTHESIA: ICD-10-CM

## 2018-09-04 DIAGNOSIS — G43.119 INTRACTABLE MIGRAINE WITH AURA WITHOUT STATUS MIGRAINOSUS: ICD-10-CM

## 2018-09-04 DIAGNOSIS — B37.31 YEAST VAGINITIS: ICD-10-CM

## 2018-09-26 ENCOUNTER — RECORDS - HEALTHEAST (OUTPATIENT)
Dept: ADMINISTRATIVE | Facility: OTHER | Age: 28
End: 2018-09-26

## 2018-09-27 ENCOUNTER — HOSPITAL ENCOUNTER (OUTPATIENT)
Dept: CT IMAGING | Facility: HOSPITAL | Age: 28
Discharge: HOME | End: 2018-09-27
Attending: OBSTETRICS & GYNECOLOGY

## 2018-09-27 DIAGNOSIS — R10.2 PELVIC PAIN: ICD-10-CM

## 2018-10-17 ENCOUNTER — AMBULATORY - HEALTHEAST (OUTPATIENT)
Dept: NURSING | Facility: CLINIC | Age: 28
End: 2018-10-17

## 2018-10-26 ENCOUNTER — COMMUNICATION - HEALTHEAST (OUTPATIENT)
Dept: FAMILY MEDICINE | Facility: CLINIC | Age: 28
End: 2018-10-26

## 2018-10-29 ENCOUNTER — OFFICE VISIT - HEALTHEAST (OUTPATIENT)
Dept: FAMILY MEDICINE | Facility: CLINIC | Age: 28
End: 2018-10-29

## 2018-10-29 DIAGNOSIS — H10.9 BACTERIAL CONJUNCTIVITIS OF LEFT EYE: ICD-10-CM

## 2018-10-29 DIAGNOSIS — H00.015 HORDEOLUM EXTERNUM OF LEFT LOWER EYELID: ICD-10-CM

## 2018-11-06 ENCOUNTER — OFFICE VISIT - HEALTHEAST (OUTPATIENT)
Dept: BEHAVIORAL HEALTH | Facility: CLINIC | Age: 28
End: 2018-11-06

## 2018-11-06 DIAGNOSIS — F31.9 BIPOLAR I DISORDER (H): ICD-10-CM

## 2018-11-06 DIAGNOSIS — F41.1 GENERALIZED ANXIETY DISORDER: ICD-10-CM

## 2018-11-08 ENCOUNTER — COMMUNICATION - HEALTHEAST (OUTPATIENT)
Dept: BEHAVIORAL HEALTH | Facility: CLINIC | Age: 28
End: 2018-11-08

## 2018-11-08 ENCOUNTER — COMMUNICATION - HEALTHEAST (OUTPATIENT)
Dept: FAMILY MEDICINE | Facility: CLINIC | Age: 28
End: 2018-11-08

## 2018-11-08 ENCOUNTER — AMBULATORY - HEALTHEAST (OUTPATIENT)
Dept: BEHAVIORAL HEALTH | Facility: CLINIC | Age: 28
End: 2018-11-08

## 2018-11-09 ENCOUNTER — AMBULATORY - HEALTHEAST (OUTPATIENT)
Dept: FAMILY MEDICINE | Facility: CLINIC | Age: 28
End: 2018-11-09

## 2018-11-19 ENCOUNTER — COMMUNICATION - HEALTHEAST (OUTPATIENT)
Dept: FAMILY MEDICINE | Facility: CLINIC | Age: 28
End: 2018-11-19

## 2018-11-26 ENCOUNTER — RECORDS - HEALTHEAST (OUTPATIENT)
Dept: ADMINISTRATIVE | Facility: OTHER | Age: 28
End: 2018-11-26

## 2018-11-26 ENCOUNTER — OFFICE VISIT - HEALTHEAST (OUTPATIENT)
Dept: FAMILY MEDICINE | Facility: CLINIC | Age: 28
End: 2018-11-26

## 2018-11-26 DIAGNOSIS — F33.40 RECURRENT MAJOR DEPRESSIVE DISORDER, IN REMISSION (H): ICD-10-CM

## 2018-11-26 DIAGNOSIS — F41.1 GENERALIZED ANXIETY DISORDER: ICD-10-CM

## 2018-11-26 DIAGNOSIS — F42.2 MIXED OBSESSIONAL THOUGHTS AND ACTS: ICD-10-CM

## 2018-11-26 DIAGNOSIS — G44.59 OTHER COMPLICATED HEADACHE SYNDROME: ICD-10-CM

## 2018-11-26 DIAGNOSIS — F41.0 PANIC ATTACKS: ICD-10-CM

## 2018-11-26 DIAGNOSIS — M25.551 HIP PAIN, RIGHT: ICD-10-CM

## 2018-11-26 DIAGNOSIS — F39 MOOD DISORDER (H): ICD-10-CM

## 2018-11-26 ASSESSMENT — MIFFLIN-ST. JEOR: SCORE: 1228.91

## 2018-11-29 ENCOUNTER — RECORDS - HEALTHEAST (OUTPATIENT)
Dept: ADMINISTRATIVE | Facility: OTHER | Age: 28
End: 2018-11-29

## 2018-12-06 ENCOUNTER — COMMUNICATION - HEALTHEAST (OUTPATIENT)
Dept: FAMILY MEDICINE | Facility: CLINIC | Age: 28
End: 2018-12-06

## 2018-12-06 ENCOUNTER — COMMUNICATION - HEALTHEAST (OUTPATIENT)
Dept: SCHEDULING | Facility: CLINIC | Age: 28
End: 2018-12-06

## 2018-12-06 ENCOUNTER — AMBULATORY - HEALTHEAST (OUTPATIENT)
Dept: FAMILY MEDICINE | Facility: CLINIC | Age: 28
End: 2018-12-06

## 2018-12-11 ENCOUNTER — AMBULATORY - HEALTHEAST (OUTPATIENT)
Dept: FAMILY MEDICINE | Facility: CLINIC | Age: 28
End: 2018-12-11

## 2018-12-11 DIAGNOSIS — M25.559 ARTHRALGIA OF HIP, UNSPECIFIED LATERALITY: ICD-10-CM

## 2018-12-11 DIAGNOSIS — R10.2 PELVIC PAIN IN FEMALE: ICD-10-CM

## 2018-12-12 ENCOUNTER — COMMUNICATION - HEALTHEAST (OUTPATIENT)
Dept: FAMILY MEDICINE | Facility: CLINIC | Age: 28
End: 2018-12-12

## 2018-12-30 ENCOUNTER — COMMUNICATION - HEALTHEAST (OUTPATIENT)
Dept: FAMILY MEDICINE | Facility: CLINIC | Age: 28
End: 2018-12-30

## 2018-12-31 ENCOUNTER — COMMUNICATION - HEALTHEAST (OUTPATIENT)
Dept: PHYSICAL MEDICINE AND REHAB | Facility: CLINIC | Age: 28
End: 2018-12-31

## 2019-01-03 ENCOUNTER — COMMUNICATION - HEALTHEAST (OUTPATIENT)
Dept: FAMILY MEDICINE | Facility: CLINIC | Age: 29
End: 2019-01-03

## 2019-01-03 ENCOUNTER — OFFICE VISIT - HEALTHEAST (OUTPATIENT)
Dept: FAMILY MEDICINE | Facility: CLINIC | Age: 29
End: 2019-01-03

## 2019-01-03 ENCOUNTER — RECORDS - HEALTHEAST (OUTPATIENT)
Dept: ADMINISTRATIVE | Facility: OTHER | Age: 29
End: 2019-01-03

## 2019-01-03 DIAGNOSIS — R79.89 ABNORMAL TSH: ICD-10-CM

## 2019-01-03 DIAGNOSIS — H02.402 PTOSIS OF EYELID, LEFT: ICD-10-CM

## 2019-01-03 DIAGNOSIS — F41.9 ANXIETY: ICD-10-CM

## 2019-01-03 DIAGNOSIS — R20.3 SENSITIVE SKIN: ICD-10-CM

## 2019-01-03 DIAGNOSIS — H53.9 VISION CHANGES: ICD-10-CM

## 2019-01-03 DIAGNOSIS — R51.9 SCALP TENDERNESS: ICD-10-CM

## 2019-01-03 DIAGNOSIS — L67.9 HAIR CHANGES: ICD-10-CM

## 2019-01-03 LAB
FERRITIN SERPL-MCNC: 57 NG/ML (ref 10–130)
IRON SATN MFR SERPL: 42 % (ref 20–50)
IRON SERPL-MCNC: 132 UG/DL (ref 42–175)
T4 FREE SERPL-MCNC: 1.4 NG/DL (ref 0.7–1.8)
TIBC SERPL-MCNC: 318 UG/DL (ref 313–563)
TRANSFERRIN SERPL-MCNC: 254 MG/DL (ref 212–360)
TSH SERPL DL<=0.005 MIU/L-ACNC: 0.01 UIU/ML (ref 0.3–5)
VIT B12 SERPL-MCNC: 567 PG/ML (ref 213–816)

## 2019-01-04 ENCOUNTER — COMMUNICATION - HEALTHEAST (OUTPATIENT)
Dept: ADMINISTRATIVE | Facility: CLINIC | Age: 29
End: 2019-01-04

## 2019-01-07 LAB
MAGNESIUM,RBC - HISTORICAL: 5.7 MG/DL (ref 3.5–7.1)
SPECIMEN STATUS: NORMAL
TSI ACT/NOR SER: 93 %

## 2019-01-08 ENCOUNTER — COMMUNICATION - HEALTHEAST (OUTPATIENT)
Dept: FAMILY MEDICINE | Facility: CLINIC | Age: 29
End: 2019-01-08

## 2019-01-08 ENCOUNTER — OFFICE VISIT - HEALTHEAST (OUTPATIENT)
Dept: BEHAVIORAL HEALTH | Facility: CLINIC | Age: 29
End: 2019-01-08

## 2019-01-08 DIAGNOSIS — F31.9 BIPOLAR I DISORDER (H): ICD-10-CM

## 2019-01-08 DIAGNOSIS — F41.1 GENERALIZED ANXIETY DISORDER: ICD-10-CM

## 2019-01-08 LAB — THYROID PEROXIDASE ANTIBODIES - HISTORICAL: <3 IU/ML (ref 0–5.6)

## 2019-01-10 ENCOUNTER — OFFICE VISIT - HEALTHEAST (OUTPATIENT)
Dept: ENDOCRINOLOGY | Facility: CLINIC | Age: 29
End: 2019-01-10

## 2019-01-10 DIAGNOSIS — E05.90 HYPERTHYROIDISM: ICD-10-CM

## 2019-01-10 ASSESSMENT — MIFFLIN-ST. JEOR: SCORE: 1200.33

## 2019-01-21 ENCOUNTER — COMMUNICATION - HEALTHEAST (OUTPATIENT)
Dept: FAMILY MEDICINE | Facility: CLINIC | Age: 29
End: 2019-01-21

## 2019-01-22 ENCOUNTER — OFFICE VISIT - HEALTHEAST (OUTPATIENT)
Dept: BEHAVIORAL HEALTH | Facility: CLINIC | Age: 29
End: 2019-01-22

## 2019-01-22 ENCOUNTER — AMBULATORY - HEALTHEAST (OUTPATIENT)
Dept: BEHAVIORAL HEALTH | Facility: CLINIC | Age: 29
End: 2019-01-22

## 2019-01-22 DIAGNOSIS — F41.1 GENERALIZED ANXIETY DISORDER: ICD-10-CM

## 2019-01-22 DIAGNOSIS — F31.9 BIPOLAR I DISORDER (H): ICD-10-CM

## 2019-02-05 ENCOUNTER — AMBULATORY - HEALTHEAST (OUTPATIENT)
Dept: BEHAVIORAL HEALTH | Facility: CLINIC | Age: 29
End: 2019-02-05

## 2019-02-16 ENCOUNTER — COMMUNICATION - HEALTHEAST (OUTPATIENT)
Dept: SCHEDULING | Facility: CLINIC | Age: 29
End: 2019-02-16

## 2019-02-20 ENCOUNTER — RECORDS - HEALTHEAST (OUTPATIENT)
Dept: ADMINISTRATIVE | Facility: OTHER | Age: 29
End: 2019-02-20

## 2019-02-26 ENCOUNTER — COMMUNICATION - HEALTHEAST (OUTPATIENT)
Dept: FAMILY MEDICINE | Facility: CLINIC | Age: 29
End: 2019-02-26

## 2019-03-03 ENCOUNTER — COMMUNICATION - HEALTHEAST (OUTPATIENT)
Dept: FAMILY MEDICINE | Facility: CLINIC | Age: 29
End: 2019-03-03

## 2019-03-04 ENCOUNTER — AMBULATORY - HEALTHEAST (OUTPATIENT)
Dept: FAMILY MEDICINE | Facility: CLINIC | Age: 29
End: 2019-03-04

## 2019-03-04 DIAGNOSIS — G43.001 MIGRAINE WITHOUT AURA AND WITH STATUS MIGRAINOSUS, NOT INTRACTABLE: ICD-10-CM

## 2019-03-15 ENCOUNTER — AMBULATORY - HEALTHEAST (OUTPATIENT)
Dept: LAB | Facility: CLINIC | Age: 29
End: 2019-03-15

## 2019-03-15 DIAGNOSIS — E05.90 HYPERTHYROIDISM: ICD-10-CM

## 2019-03-15 LAB
T4 FREE SERPL-MCNC: 1 NG/DL (ref 0.7–1.8)
TSH SERPL DL<=0.005 MIU/L-ACNC: <0.01 UIU/ML (ref 0.3–5)

## 2019-03-16 ENCOUNTER — COMMUNICATION - HEALTHEAST (OUTPATIENT)
Dept: ENDOCRINOLOGY | Facility: CLINIC | Age: 29
End: 2019-03-16

## 2019-03-25 ENCOUNTER — OFFICE VISIT - HEALTHEAST (OUTPATIENT)
Dept: BEHAVIORAL HEALTH | Facility: CLINIC | Age: 29
End: 2019-03-25

## 2019-03-25 DIAGNOSIS — F41.1 GENERALIZED ANXIETY DISORDER: ICD-10-CM

## 2019-03-25 DIAGNOSIS — F30.9 BIPOLAR I DISORDER, SINGLE MANIC EPISODE (H): ICD-10-CM

## 2019-03-25 ASSESSMENT — MIFFLIN-ST. JEOR: SCORE: 1206.23

## 2019-03-31 ENCOUNTER — COMMUNICATION - HEALTHEAST (OUTPATIENT)
Dept: FAMILY MEDICINE | Facility: CLINIC | Age: 29
End: 2019-03-31

## 2019-03-31 DIAGNOSIS — F41.1 GENERALIZED ANXIETY DISORDER: ICD-10-CM

## 2019-04-08 ENCOUNTER — OFFICE VISIT - HEALTHEAST (OUTPATIENT)
Dept: FAMILY MEDICINE | Facility: CLINIC | Age: 29
End: 2019-04-08

## 2019-04-08 ENCOUNTER — OFFICE VISIT - HEALTHEAST (OUTPATIENT)
Dept: BEHAVIORAL HEALTH | Facility: CLINIC | Age: 29
End: 2019-04-08

## 2019-04-08 DIAGNOSIS — H04.123 DRY EYES: ICD-10-CM

## 2019-04-08 DIAGNOSIS — F41.1 GENERALIZED ANXIETY DISORDER: ICD-10-CM

## 2019-04-08 DIAGNOSIS — F31.0 BIPOLAR AFFECTIVE DISORDER, CURRENT EPISODE HYPOMANIC (H): ICD-10-CM

## 2019-04-08 DIAGNOSIS — E05.90 HYPERTHYROIDISM: ICD-10-CM

## 2019-04-08 DIAGNOSIS — F31.70 BIPOLAR AFFECTIVE DISORDER IN REMISSION (H): ICD-10-CM

## 2019-04-08 DIAGNOSIS — G43.119 INTRACTABLE MIGRAINE WITH AURA WITHOUT STATUS MIGRAINOSUS: ICD-10-CM

## 2019-04-08 DIAGNOSIS — F30.9 BIPOLAR I DISORDER, SINGLE MANIC EPISODE (H): ICD-10-CM

## 2019-04-08 DIAGNOSIS — N81.4 UTEROVAGINAL PROLAPSE: ICD-10-CM

## 2019-04-08 DIAGNOSIS — E05.00 GRAVES DISEASE: ICD-10-CM

## 2019-04-08 LAB
ALBUMIN SERPL-MCNC: 4.1 G/DL (ref 3.5–5)
ALP SERPL-CCNC: 113 U/L (ref 45–120)
ALT SERPL W P-5'-P-CCNC: 19 U/L (ref 0–45)
ANION GAP SERPL CALCULATED.3IONS-SCNC: 7 MMOL/L (ref 5–18)
AST SERPL W P-5'-P-CCNC: 21 U/L (ref 0–40)
BASOPHILS # BLD AUTO: 0 THOU/UL (ref 0–0.2)
BASOPHILS NFR BLD AUTO: 1 % (ref 0–2)
BILIRUB SERPL-MCNC: 0.4 MG/DL (ref 0–1)
BUN SERPL-MCNC: 13 MG/DL (ref 8–22)
CALCIUM SERPL-MCNC: 9.8 MG/DL (ref 8.5–10.5)
CHLORIDE BLD-SCNC: 104 MMOL/L (ref 98–107)
CO2 SERPL-SCNC: 29 MMOL/L (ref 22–31)
CREAT SERPL-MCNC: 0.66 MG/DL (ref 0.6–1.1)
EOSINOPHIL # BLD AUTO: 0.1 THOU/UL (ref 0–0.4)
EOSINOPHIL NFR BLD AUTO: 1 % (ref 0–6)
ERYTHROCYTE [DISTWIDTH] IN BLOOD BY AUTOMATED COUNT: 12.5 % (ref 11–14.5)
GFR SERPL CREATININE-BSD FRML MDRD: >60 ML/MIN/1.73M2
GLUCOSE BLD-MCNC: 78 MG/DL (ref 70–125)
HCG SERPL QL: NEGATIVE
HCT VFR BLD AUTO: 36.4 % (ref 35–47)
HGB BLD-MCNC: 12.6 G/DL (ref 12–16)
LYMPHOCYTES # BLD AUTO: 1.7 THOU/UL (ref 0.8–4.4)
LYMPHOCYTES NFR BLD AUTO: 33 % (ref 20–40)
MAGNESIUM SERPL-MCNC: 2.2 MG/DL (ref 1.8–2.6)
MCH RBC QN AUTO: 31.1 PG (ref 27–34)
MCHC RBC AUTO-ENTMCNC: 34.7 G/DL (ref 32–36)
MCV RBC AUTO: 90 FL (ref 80–100)
MONOCYTES # BLD AUTO: 0.3 THOU/UL (ref 0–0.9)
MONOCYTES NFR BLD AUTO: 5 % (ref 2–10)
NEUTROPHILS # BLD AUTO: 3.2 THOU/UL (ref 2–7.7)
NEUTROPHILS NFR BLD AUTO: 60 % (ref 50–70)
PLATELET # BLD AUTO: 272 THOU/UL (ref 140–440)
PMV BLD AUTO: 7.4 FL (ref 7–10)
POTASSIUM BLD-SCNC: 4.3 MMOL/L (ref 3.5–5)
PROT SERPL-MCNC: 7.1 G/DL (ref 6–8)
RBC # BLD AUTO: 4.07 MILL/UL (ref 3.8–5.4)
SODIUM SERPL-SCNC: 140 MMOL/L (ref 136–145)
T3 SERPL-MCNC: 100 NG/DL (ref 45–175)
T4 FREE SERPL-MCNC: 0.9 NG/DL (ref 0.7–1.8)
TSH SERPL DL<=0.005 MIU/L-ACNC: 0.99 UIU/ML (ref 0.3–5)
WBC: 5.2 THOU/UL (ref 4–11)

## 2019-04-08 ASSESSMENT — MIFFLIN-ST. JEOR: SCORE: 1197.16

## 2019-04-12 ENCOUNTER — COMMUNICATION - HEALTHEAST (OUTPATIENT)
Dept: FAMILY MEDICINE | Facility: CLINIC | Age: 29
End: 2019-04-12

## 2019-04-13 LAB — TSI ACT/NOR SER: 91 %

## 2019-04-15 ENCOUNTER — COMMUNICATION - HEALTHEAST (OUTPATIENT)
Dept: FAMILY MEDICINE | Facility: CLINIC | Age: 29
End: 2019-04-15

## 2019-04-18 ENCOUNTER — ANESTHESIA - HEALTHEAST (OUTPATIENT)
Dept: SURGERY | Facility: HOSPITAL | Age: 29
End: 2019-04-18

## 2019-04-18 ENCOUNTER — SURGERY - HEALTHEAST (OUTPATIENT)
Dept: SURGERY | Facility: HOSPITAL | Age: 29
End: 2019-04-18

## 2019-04-18 ASSESSMENT — MIFFLIN-ST. JEOR: SCORE: 1181.28

## 2019-04-20 ENCOUNTER — COMMUNICATION - HEALTHEAST (OUTPATIENT)
Dept: SCHEDULING | Facility: CLINIC | Age: 29
End: 2019-04-20

## 2019-04-22 ENCOUNTER — COMMUNICATION - HEALTHEAST (OUTPATIENT)
Dept: FAMILY MEDICINE | Facility: CLINIC | Age: 29
End: 2019-04-22

## 2019-04-22 ENCOUNTER — COMMUNICATION - HEALTHEAST (OUTPATIENT)
Dept: SCHEDULING | Facility: CLINIC | Age: 29
End: 2019-04-22

## 2019-04-25 ENCOUNTER — OFFICE VISIT - HEALTHEAST (OUTPATIENT)
Dept: FAMILY MEDICINE | Facility: CLINIC | Age: 29
End: 2019-04-25

## 2019-04-25 ENCOUNTER — COMMUNICATION - HEALTHEAST (OUTPATIENT)
Dept: FAMILY MEDICINE | Facility: CLINIC | Age: 29
End: 2019-04-25

## 2019-04-25 DIAGNOSIS — M62.08 DIASTASIS RECTI: ICD-10-CM

## 2019-04-25 DIAGNOSIS — G43.001 MIGRAINE WITHOUT AURA AND WITH STATUS MIGRAINOSUS, NOT INTRACTABLE: ICD-10-CM

## 2019-04-25 DIAGNOSIS — M54.12 CERVICAL RADICULOPATHY: ICD-10-CM

## 2019-04-25 DIAGNOSIS — M54.2 NECK PAIN: ICD-10-CM

## 2019-04-25 DIAGNOSIS — G43.119 INTRACTABLE MIGRAINE WITH AURA WITHOUT STATUS MIGRAINOSUS: ICD-10-CM

## 2019-04-25 DIAGNOSIS — E05.00 GRAVES DISEASE: ICD-10-CM

## 2019-04-25 LAB
T4 FREE SERPL-MCNC: 0.9 NG/DL (ref 0.7–1.8)
TSH SERPL DL<=0.005 MIU/L-ACNC: 0.59 UIU/ML (ref 0.3–5)

## 2019-04-26 ENCOUNTER — COMMUNICATION - HEALTHEAST (OUTPATIENT)
Dept: FAMILY MEDICINE | Facility: CLINIC | Age: 29
End: 2019-04-26

## 2019-04-29 ENCOUNTER — AMBULATORY - HEALTHEAST (OUTPATIENT)
Dept: FAMILY MEDICINE | Facility: CLINIC | Age: 29
End: 2019-04-29

## 2019-04-29 DIAGNOSIS — G43.001 MIGRAINE WITHOUT AURA AND WITH STATUS MIGRAINOSUS, NOT INTRACTABLE: ICD-10-CM

## 2019-04-30 ENCOUNTER — RECORDS - HEALTHEAST (OUTPATIENT)
Dept: ADMINISTRATIVE | Facility: OTHER | Age: 29
End: 2019-04-30

## 2019-05-07 ENCOUNTER — RECORDS - HEALTHEAST (OUTPATIENT)
Dept: ADMINISTRATIVE | Facility: OTHER | Age: 29
End: 2019-05-07

## 2019-05-16 ENCOUNTER — AMBULATORY - HEALTHEAST (OUTPATIENT)
Dept: FAMILY MEDICINE | Facility: CLINIC | Age: 29
End: 2019-05-16

## 2019-05-16 ENCOUNTER — COMMUNICATION - HEALTHEAST (OUTPATIENT)
Dept: SCHEDULING | Facility: CLINIC | Age: 29
End: 2019-05-16

## 2019-05-16 ENCOUNTER — COMMUNICATION - HEALTHEAST (OUTPATIENT)
Dept: FAMILY MEDICINE | Facility: CLINIC | Age: 29
End: 2019-05-16

## 2019-05-16 DIAGNOSIS — E05.00 GRAVES DISEASE: ICD-10-CM

## 2019-05-16 DIAGNOSIS — G43.001 MIGRAINE WITHOUT AURA AND WITH STATUS MIGRAINOSUS, NOT INTRACTABLE: ICD-10-CM

## 2019-05-20 ENCOUNTER — OFFICE VISIT - HEALTHEAST (OUTPATIENT)
Dept: BEHAVIORAL HEALTH | Facility: CLINIC | Age: 29
End: 2019-05-20

## 2019-05-20 DIAGNOSIS — F41.1 GENERALIZED ANXIETY DISORDER: ICD-10-CM

## 2019-05-20 DIAGNOSIS — F31.32 BIPOLAR AFFECTIVE DISORDER, CURRENTLY DEPRESSED, MODERATE (H): ICD-10-CM

## 2019-05-20 DIAGNOSIS — F45.0 SEVERE SOMATOFORM DISORDER: ICD-10-CM

## 2019-05-20 ASSESSMENT — MIFFLIN-ST. JEOR: SCORE: 1192.62

## 2019-05-21 ENCOUNTER — AMBULATORY - HEALTHEAST (OUTPATIENT)
Dept: FAMILY MEDICINE | Facility: CLINIC | Age: 29
End: 2019-05-21

## 2019-05-21 DIAGNOSIS — M54.12 CERVICAL RADICULOPATHY: ICD-10-CM

## 2019-06-05 ENCOUNTER — RECORDS - HEALTHEAST (OUTPATIENT)
Dept: ADMINISTRATIVE | Facility: OTHER | Age: 29
End: 2019-06-05

## 2019-06-08 ENCOUNTER — COMMUNICATION - HEALTHEAST (OUTPATIENT)
Dept: FAMILY MEDICINE | Facility: CLINIC | Age: 29
End: 2019-06-08

## 2019-06-10 ENCOUNTER — COMMUNICATION - HEALTHEAST (OUTPATIENT)
Dept: FAMILY MEDICINE | Facility: CLINIC | Age: 29
End: 2019-06-10

## 2019-06-11 ENCOUNTER — AMBULATORY - HEALTHEAST (OUTPATIENT)
Dept: FAMILY MEDICINE | Facility: CLINIC | Age: 29
End: 2019-06-11

## 2019-06-11 DIAGNOSIS — B85.2 LICE: ICD-10-CM

## 2019-06-20 ENCOUNTER — COMMUNICATION - HEALTHEAST (OUTPATIENT)
Dept: FAMILY MEDICINE | Facility: CLINIC | Age: 29
End: 2019-06-20

## 2019-06-20 ENCOUNTER — HOSPITAL ENCOUNTER (OUTPATIENT)
Dept: MRI IMAGING | Facility: HOSPITAL | Age: 29
Discharge: HOME | End: 2019-06-20
Attending: PSYCHIATRY & NEUROLOGY

## 2019-06-20 DIAGNOSIS — G43.109 MIGRAINE WITH AURA: ICD-10-CM

## 2019-06-20 DIAGNOSIS — B85.0 PEDICULOSIS CAPITIS: ICD-10-CM

## 2019-06-21 ENCOUNTER — OFFICE VISIT - HEALTHEAST (OUTPATIENT)
Dept: FAMILY MEDICINE | Facility: CLINIC | Age: 29
End: 2019-06-21

## 2019-06-21 DIAGNOSIS — H60.392 INFECTIVE OTITIS EXTERNA, LEFT: ICD-10-CM

## 2019-06-21 DIAGNOSIS — M26.609 TMJ (TEMPOROMANDIBULAR JOINT SYNDROME): ICD-10-CM

## 2019-07-03 ENCOUNTER — COMMUNICATION - HEALTHEAST (OUTPATIENT)
Dept: BEHAVIORAL HEALTH | Facility: CLINIC | Age: 29
End: 2019-07-03

## 2019-07-22 ENCOUNTER — OFFICE VISIT - HEALTHEAST (OUTPATIENT)
Dept: BEHAVIORAL HEALTH | Facility: CLINIC | Age: 29
End: 2019-07-22

## 2019-07-22 DIAGNOSIS — F41.1 GENERALIZED ANXIETY DISORDER: ICD-10-CM

## 2019-07-22 DIAGNOSIS — F33.9 MAJOR DEPRESSIVE DISORDER, RECURRENT EPISODE WITH ANXIOUS DISTRESS (H): ICD-10-CM

## 2019-07-22 ASSESSMENT — MIFFLIN-ST. JEOR: SCORE: 1192.62

## 2019-07-23 ENCOUNTER — COMMUNICATION - HEALTHEAST (OUTPATIENT)
Dept: BEHAVIORAL HEALTH | Facility: CLINIC | Age: 29
End: 2019-07-23

## 2019-07-29 ENCOUNTER — COMMUNICATION - HEALTHEAST (OUTPATIENT)
Dept: BEHAVIORAL HEALTH | Facility: CLINIC | Age: 29
End: 2019-07-29

## 2019-08-28 ENCOUNTER — RECORDS - HEALTHEAST (OUTPATIENT)
Dept: ADMINISTRATIVE | Facility: OTHER | Age: 29
End: 2019-08-28

## 2019-08-29 ENCOUNTER — OFFICE VISIT - HEALTHEAST (OUTPATIENT)
Dept: FAMILY MEDICINE | Facility: CLINIC | Age: 29
End: 2019-08-29

## 2019-08-29 DIAGNOSIS — L73.9 FOLLICULITIS: ICD-10-CM

## 2019-08-29 ASSESSMENT — MIFFLIN-ST. JEOR: SCORE: 1179.01

## 2019-09-03 ENCOUNTER — RECORDS - HEALTHEAST (OUTPATIENT)
Dept: ADMINISTRATIVE | Facility: OTHER | Age: 29
End: 2019-09-03

## 2019-09-06 ENCOUNTER — COMMUNICATION - HEALTHEAST (OUTPATIENT)
Dept: FAMILY MEDICINE | Facility: CLINIC | Age: 29
End: 2019-09-06

## 2019-09-06 DIAGNOSIS — B96.89 BACTERIAL VAGINOSIS: ICD-10-CM

## 2019-09-06 DIAGNOSIS — N76.0 BACTERIAL VAGINOSIS: ICD-10-CM

## 2019-09-07 ENCOUNTER — COMMUNICATION - HEALTHEAST (OUTPATIENT)
Dept: FAMILY MEDICINE | Facility: CLINIC | Age: 29
End: 2019-09-07

## 2019-09-07 DIAGNOSIS — E05.00 GRAVES DISEASE: ICD-10-CM

## 2019-09-09 ENCOUNTER — OFFICE VISIT - HEALTHEAST (OUTPATIENT)
Dept: BEHAVIORAL HEALTH | Facility: CLINIC | Age: 29
End: 2019-09-09

## 2019-09-09 DIAGNOSIS — F33.0 MAJOR DEPRESSIVE DISORDER, RECURRENT EPISODE, MILD (H): ICD-10-CM

## 2019-09-09 DIAGNOSIS — F41.1 GENERALIZED ANXIETY DISORDER: ICD-10-CM

## 2019-09-09 ASSESSMENT — ANXIETY QUESTIONNAIRES
3. WORRYING TOO MUCH ABOUT DIFFERENT THINGS: NEARLY EVERY DAY
5. BEING SO RESTLESS THAT IT IS HARD TO SIT STILL: NEARLY EVERY DAY
GAD7 TOTAL SCORE: 20
2. NOT BEING ABLE TO STOP OR CONTROL WORRYING: NEARLY EVERY DAY
4. TROUBLE RELAXING: NEARLY EVERY DAY
1. FEELING NERVOUS, ANXIOUS, OR ON EDGE: NEARLY EVERY DAY
6. BECOMING EASILY ANNOYED OR IRRITABLE: MORE THAN HALF THE DAYS
7. FEELING AFRAID AS IF SOMETHING AWFUL MIGHT HAPPEN: NEARLY EVERY DAY

## 2019-09-09 ASSESSMENT — PATIENT HEALTH QUESTIONNAIRE - PHQ9: SUM OF ALL RESPONSES TO PHQ QUESTIONS 1-9: 19

## 2019-09-09 ASSESSMENT — MIFFLIN-ST. JEOR: SCORE: 1183.55

## 2019-09-10 ENCOUNTER — RECORDS - HEALTHEAST (OUTPATIENT)
Dept: ADMINISTRATIVE | Facility: OTHER | Age: 29
End: 2019-09-10

## 2019-09-11 ENCOUNTER — COMMUNICATION - HEALTHEAST (OUTPATIENT)
Dept: BEHAVIORAL HEALTH | Facility: CLINIC | Age: 29
End: 2019-09-11

## 2019-09-13 ENCOUNTER — COMMUNICATION - HEALTHEAST (OUTPATIENT)
Dept: BEHAVIORAL HEALTH | Facility: CLINIC | Age: 29
End: 2019-09-13

## 2019-09-17 ENCOUNTER — OFFICE VISIT - HEALTHEAST (OUTPATIENT)
Dept: FAMILY MEDICINE | Facility: CLINIC | Age: 29
End: 2019-09-17

## 2019-09-17 DIAGNOSIS — F41.1 GENERALIZED ANXIETY DISORDER: ICD-10-CM

## 2019-09-17 DIAGNOSIS — L71.0 PERIORAL DERMATITIS: ICD-10-CM

## 2019-09-17 DIAGNOSIS — G43.119 INTRACTABLE MIGRAINE WITH AURA WITHOUT STATUS MIGRAINOSUS: ICD-10-CM

## 2019-09-17 DIAGNOSIS — F31.60 BIPOLAR AFFECTIVE DISORDER, CURRENT EPISODE MIXED, CURRENT EPISODE SEVERITY UNSPECIFIED (H): ICD-10-CM

## 2019-09-17 DIAGNOSIS — F43.10 PTSD (POST-TRAUMATIC STRESS DISORDER): ICD-10-CM

## 2019-09-17 DIAGNOSIS — G43.001 MIGRAINE WITHOUT AURA AND WITH STATUS MIGRAINOSUS, NOT INTRACTABLE: ICD-10-CM

## 2019-09-19 ENCOUNTER — COMMUNICATION - HEALTHEAST (OUTPATIENT)
Dept: FAMILY MEDICINE | Facility: CLINIC | Age: 29
End: 2019-09-19

## 2019-09-19 ASSESSMENT — PATIENT HEALTH QUESTIONNAIRE - PHQ9: SUM OF ALL RESPONSES TO PHQ QUESTIONS 1-9: 21

## 2019-09-20 ENCOUNTER — OFFICE VISIT - HEALTHEAST (OUTPATIENT)
Dept: INTERNAL MEDICINE | Facility: CLINIC | Age: 29
End: 2019-09-20

## 2019-09-20 DIAGNOSIS — N89.8 VAGINAL IRRITATION: ICD-10-CM

## 2019-09-20 DIAGNOSIS — Z11.3 SCREEN FOR STD (SEXUALLY TRANSMITTED DISEASE): ICD-10-CM

## 2019-09-20 LAB
CLUE CELLS: NORMAL
HIV 1+2 AB+HIV1 P24 AG SERPL QL IA: NEGATIVE
TRICHOMONAS, WET PREP: NORMAL
YEAST, WET PREP: NORMAL

## 2019-09-20 ASSESSMENT — MIFFLIN-ST. JEOR: SCORE: 1171.18

## 2019-09-21 ENCOUNTER — COMMUNICATION - HEALTHEAST (OUTPATIENT)
Dept: FAMILY MEDICINE | Facility: CLINIC | Age: 29
End: 2019-09-21

## 2019-09-21 DIAGNOSIS — G43.001 MIGRAINE WITHOUT AURA AND WITH STATUS MIGRAINOSUS, NOT INTRACTABLE: ICD-10-CM

## 2019-09-21 LAB — T PALLIDUM AB SER QL: NEGATIVE

## 2019-09-23 LAB
C TRACH DNA SPEC QL PROBE+SIG AMP: NEGATIVE
HAV IGM SERPL QL IA: NEGATIVE
HBV CORE IGM SERPL QL IA: NEGATIVE
HBV SURFACE AG SERPL QL IA: NEGATIVE
HCV AB SERPL QL IA: NEGATIVE
HSV1 IGG SERPL QL IA: NEGATIVE
HSV2 IGG SERPL QL IA: NEGATIVE
N GONORRHOEA DNA SPEC QL NAA+PROBE: NEGATIVE

## 2019-09-24 ENCOUNTER — AMBULATORY - HEALTHEAST (OUTPATIENT)
Dept: FAMILY MEDICINE | Facility: CLINIC | Age: 29
End: 2019-09-24

## 2019-09-24 DIAGNOSIS — L71.0 PERIORAL DERMATITIS: ICD-10-CM

## 2019-10-07 ENCOUNTER — COMMUNICATION - HEALTHEAST (OUTPATIENT)
Dept: FAMILY MEDICINE | Facility: CLINIC | Age: 29
End: 2019-10-07

## 2019-10-07 DIAGNOSIS — E05.00 GRAVES DISEASE: ICD-10-CM

## 2019-10-10 ENCOUNTER — RECORDS - HEALTHEAST (OUTPATIENT)
Dept: LAB | Facility: HOSPITAL | Age: 29
End: 2019-10-10

## 2019-10-10 LAB
ANION GAP SERPL CALCULATED.3IONS-SCNC: 7 MMOL/L (ref 5–18)
CHLORIDE BLD-SCNC: 107 MMOL/L (ref 98–107)
CO2 SERPL-SCNC: 28 MMOL/L (ref 22–31)
POTASSIUM BLD-SCNC: 3.8 MMOL/L (ref 3.5–5)
SODIUM SERPL-SCNC: 142 MMOL/L (ref 136–145)

## 2019-10-12 LAB
CARDIOLIPIN IGA SER IA-ACNC: <0.5 APL U/ML (ref 0–19.9)
CARDIOLIPIN IGG SER IA-ACNC: <1.6 GPL-U/ML (ref 0–19.9)
CARDIOLIPIN IGM SER IA-ACNC: <0.2 MPL-U/ML (ref 0–19.9)

## 2019-10-14 ENCOUNTER — AMBULATORY - HEALTHEAST (OUTPATIENT)
Dept: FAMILY MEDICINE | Facility: CLINIC | Age: 29
End: 2019-10-14

## 2019-10-21 ENCOUNTER — COMMUNICATION - HEALTHEAST (OUTPATIENT)
Dept: FAMILY MEDICINE | Facility: CLINIC | Age: 29
End: 2019-10-21

## 2019-10-28 ENCOUNTER — OFFICE VISIT - HEALTHEAST (OUTPATIENT)
Dept: BEHAVIORAL HEALTH | Facility: CLINIC | Age: 29
End: 2019-10-28

## 2019-10-28 DIAGNOSIS — F43.10 PTSD (POST-TRAUMATIC STRESS DISORDER): ICD-10-CM

## 2019-10-28 DIAGNOSIS — F31.81 BIPOLAR 2 DISORDER, MAJOR DEPRESSIVE EPISODE (H): ICD-10-CM

## 2019-10-28 DIAGNOSIS — F41.1 GENERALIZED ANXIETY DISORDER: ICD-10-CM

## 2019-10-28 ASSESSMENT — MIFFLIN-ST. JEOR: SCORE: 1160.29

## 2019-10-28 ASSESSMENT — ANXIETY QUESTIONNAIRES
GAD7 TOTAL SCORE: 21
IF YOU CHECKED OFF ANY PROBLEMS ON THIS QUESTIONNAIRE, HOW DIFFICULT HAVE THESE PROBLEMS MADE IT FOR YOU TO DO YOUR WORK, TAKE CARE OF THINGS AT HOME, OR GET ALONG WITH OTHER PEOPLE: VERY DIFFICULT
1. FEELING NERVOUS, ANXIOUS, OR ON EDGE: NEARLY EVERY DAY
7. FEELING AFRAID AS IF SOMETHING AWFUL MIGHT HAPPEN: NEARLY EVERY DAY
3. WORRYING TOO MUCH ABOUT DIFFERENT THINGS: NEARLY EVERY DAY
5. BEING SO RESTLESS THAT IT IS HARD TO SIT STILL: NEARLY EVERY DAY
2. NOT BEING ABLE TO STOP OR CONTROL WORRYING: NEARLY EVERY DAY
4. TROUBLE RELAXING: NEARLY EVERY DAY
6. BECOMING EASILY ANNOYED OR IRRITABLE: NEARLY EVERY DAY

## 2019-10-28 ASSESSMENT — PATIENT HEALTH QUESTIONNAIRE - PHQ9: SUM OF ALL RESPONSES TO PHQ QUESTIONS 1-9: 20

## 2019-11-05 ENCOUNTER — COMMUNICATION - HEALTHEAST (OUTPATIENT)
Dept: FAMILY MEDICINE | Facility: CLINIC | Age: 29
End: 2019-11-05

## 2019-11-06 ENCOUNTER — AMBULATORY - HEALTHEAST (OUTPATIENT)
Dept: FAMILY MEDICINE | Facility: CLINIC | Age: 29
End: 2019-11-06

## 2019-11-06 ENCOUNTER — COMMUNICATION - HEALTHEAST (OUTPATIENT)
Dept: FAMILY MEDICINE | Facility: CLINIC | Age: 29
End: 2019-11-06

## 2019-11-06 DIAGNOSIS — E05.00 GRAVES DISEASE: ICD-10-CM

## 2019-11-06 DIAGNOSIS — G43.001 MIGRAINE WITHOUT AURA AND WITH STATUS MIGRAINOSUS, NOT INTRACTABLE: ICD-10-CM

## 2019-11-25 ENCOUNTER — COMMUNICATION - HEALTHEAST (OUTPATIENT)
Dept: FAMILY MEDICINE | Facility: CLINIC | Age: 29
End: 2019-11-25

## 2019-12-04 ENCOUNTER — COMMUNICATION - HEALTHEAST (OUTPATIENT)
Dept: BEHAVIORAL HEALTH | Facility: CLINIC | Age: 29
End: 2019-12-04

## 2019-12-05 ENCOUNTER — RECORDS - HEALTHEAST (OUTPATIENT)
Dept: ADMINISTRATIVE | Facility: OTHER | Age: 29
End: 2019-12-05

## 2019-12-06 ENCOUNTER — AMBULATORY - HEALTHEAST (OUTPATIENT)
Dept: PHARMACY | Facility: CLINIC | Age: 29
End: 2019-12-06

## 2019-12-06 ENCOUNTER — COMMUNICATION - HEALTHEAST (OUTPATIENT)
Dept: FAMILY MEDICINE | Facility: CLINIC | Age: 29
End: 2019-12-06

## 2019-12-06 ENCOUNTER — OFFICE VISIT - HEALTHEAST (OUTPATIENT)
Dept: FAMILY MEDICINE | Facility: CLINIC | Age: 29
End: 2019-12-06

## 2019-12-06 DIAGNOSIS — M54.16 LUMBAR RADICULOPATHY: ICD-10-CM

## 2019-12-06 DIAGNOSIS — M53.3 SACROILIAC JOINT DYSFUNCTION: ICD-10-CM

## 2019-12-06 DIAGNOSIS — M25.562 PATELLOFEMORAL ARTHRALGIA OF LEFT KNEE: ICD-10-CM

## 2019-12-06 DIAGNOSIS — F31.60 BIPOLAR AFFECTIVE DISORDER, CURRENT EPISODE MIXED, CURRENT EPISODE SEVERITY UNSPECIFIED (H): ICD-10-CM

## 2019-12-06 DIAGNOSIS — E05.00 GRAVES DISEASE: ICD-10-CM

## 2019-12-06 DIAGNOSIS — F43.10 PTSD (POST-TRAUMATIC STRESS DISORDER): ICD-10-CM

## 2019-12-06 DIAGNOSIS — F98.8 ATTENTION DEFICIT DISORDER (ADD) WITHOUT HYPERACTIVITY: ICD-10-CM

## 2019-12-06 DIAGNOSIS — E05.90 HYPERTHYROIDISM: ICD-10-CM

## 2019-12-06 DIAGNOSIS — G43.119 INTRACTABLE MIGRAINE WITH AURA WITHOUT STATUS MIGRAINOSUS: ICD-10-CM

## 2019-12-06 DIAGNOSIS — F41.1 GENERALIZED ANXIETY DISORDER: ICD-10-CM

## 2019-12-06 DIAGNOSIS — N83.202 CYST OF LEFT OVARY: ICD-10-CM

## 2019-12-06 LAB
ALBUMIN SERPL-MCNC: 4.2 G/DL (ref 3.5–5)
ALP SERPL-CCNC: 77 U/L (ref 45–120)
ALT SERPL W P-5'-P-CCNC: 12 U/L (ref 0–45)
ANION GAP SERPL CALCULATED.3IONS-SCNC: 9 MMOL/L (ref 5–18)
AST SERPL W P-5'-P-CCNC: 15 U/L (ref 0–40)
BILIRUB SERPL-MCNC: 0.4 MG/DL (ref 0–1)
BUN SERPL-MCNC: 18 MG/DL (ref 8–22)
CALCIUM SERPL-MCNC: 10 MG/DL (ref 8.5–10.5)
CHLORIDE BLD-SCNC: 104 MMOL/L (ref 98–107)
CO2 SERPL-SCNC: 27 MMOL/L (ref 22–31)
CREAT SERPL-MCNC: 0.71 MG/DL (ref 0.6–1.1)
GFR SERPL CREATININE-BSD FRML MDRD: >60 ML/MIN/1.73M2
GLUCOSE BLD-MCNC: 83 MG/DL (ref 70–125)
POTASSIUM BLD-SCNC: 4.8 MMOL/L (ref 3.5–5)
PROT SERPL-MCNC: 7.6 G/DL (ref 6–8)
SODIUM SERPL-SCNC: 140 MMOL/L (ref 136–145)
T3 SERPL-MCNC: 110 NG/DL (ref 45–175)
T4 FREE SERPL-MCNC: 0.9 NG/DL (ref 0.7–1.8)
TSH SERPL DL<=0.005 MIU/L-ACNC: 2.54 UIU/ML (ref 0.3–5)

## 2019-12-09 ENCOUNTER — COMMUNICATION - HEALTHEAST (OUTPATIENT)
Dept: FAMILY MEDICINE | Facility: CLINIC | Age: 29
End: 2019-12-09

## 2019-12-09 ENCOUNTER — OFFICE VISIT - HEALTHEAST (OUTPATIENT)
Dept: BEHAVIORAL HEALTH | Facility: CLINIC | Age: 29
End: 2019-12-09

## 2019-12-09 DIAGNOSIS — F43.10 PTSD (POST-TRAUMATIC STRESS DISORDER): ICD-10-CM

## 2019-12-09 DIAGNOSIS — F41.1 GENERALIZED ANXIETY DISORDER: ICD-10-CM

## 2019-12-09 ASSESSMENT — ANXIETY QUESTIONNAIRES
6. BECOMING EASILY ANNOYED OR IRRITABLE: NEARLY EVERY DAY
1. FEELING NERVOUS, ANXIOUS, OR ON EDGE: NEARLY EVERY DAY
4. TROUBLE RELAXING: NEARLY EVERY DAY
2. NOT BEING ABLE TO STOP OR CONTROL WORRYING: NEARLY EVERY DAY
5. BEING SO RESTLESS THAT IT IS HARD TO SIT STILL: NEARLY EVERY DAY
GAD7 TOTAL SCORE: 21
3. WORRYING TOO MUCH ABOUT DIFFERENT THINGS: NEARLY EVERY DAY
7. FEELING AFRAID AS IF SOMETHING AWFUL MIGHT HAPPEN: NEARLY EVERY DAY

## 2019-12-09 ASSESSMENT — PATIENT HEALTH QUESTIONNAIRE - PHQ9: SUM OF ALL RESPONSES TO PHQ QUESTIONS 1-9: 23

## 2019-12-10 ENCOUNTER — COMMUNICATION - HEALTHEAST (OUTPATIENT)
Dept: PHARMACY | Facility: CLINIC | Age: 29
End: 2019-12-10

## 2019-12-11 ENCOUNTER — RECORDS - HEALTHEAST (OUTPATIENT)
Dept: ADMINISTRATIVE | Facility: OTHER | Age: 29
End: 2019-12-11

## 2019-12-18 ENCOUNTER — OFFICE VISIT - HEALTHEAST (OUTPATIENT)
Dept: FAMILY MEDICINE | Facility: CLINIC | Age: 29
End: 2019-12-18

## 2019-12-18 DIAGNOSIS — R52 BODY ACHES: ICD-10-CM

## 2019-12-18 DIAGNOSIS — J02.9 SORE THROAT: ICD-10-CM

## 2019-12-18 DIAGNOSIS — J02.9 VIRAL PHARYNGITIS: ICD-10-CM

## 2019-12-18 LAB
DEPRECATED S PYO AG THROAT QL EIA: NORMAL
FLUAV AG SPEC QL IA: NORMAL
FLUBV AG SPEC QL IA: NORMAL

## 2019-12-19 LAB — GROUP A STREP BY PCR: NORMAL

## 2019-12-20 ENCOUNTER — COMMUNICATION - HEALTHEAST (OUTPATIENT)
Dept: FAMILY MEDICINE | Facility: CLINIC | Age: 29
End: 2019-12-20

## 2019-12-21 ENCOUNTER — COMMUNICATION - HEALTHEAST (OUTPATIENT)
Dept: FAMILY MEDICINE | Facility: CLINIC | Age: 29
End: 2019-12-21

## 2019-12-21 DIAGNOSIS — F41.9 ANXIETY: ICD-10-CM

## 2019-12-24 ENCOUNTER — COMMUNICATION - HEALTHEAST (OUTPATIENT)
Dept: FAMILY MEDICINE | Facility: CLINIC | Age: 29
End: 2019-12-24

## 2019-12-24 DIAGNOSIS — E05.00 GRAVES DISEASE: ICD-10-CM

## 2020-01-02 ENCOUNTER — COMMUNICATION - HEALTHEAST (OUTPATIENT)
Dept: SCHEDULING | Facility: CLINIC | Age: 30
End: 2020-01-02

## 2020-01-03 ASSESSMENT — MIFFLIN-ST. JEOR: SCORE: 1114.94

## 2020-01-05 ENCOUNTER — COMMUNICATION - HEALTHEAST (OUTPATIENT)
Dept: FAMILY MEDICINE | Facility: CLINIC | Age: 30
End: 2020-01-05

## 2020-01-05 DIAGNOSIS — G43.001 MIGRAINE WITHOUT AURA AND WITH STATUS MIGRAINOSUS, NOT INTRACTABLE: ICD-10-CM

## 2020-01-08 ENCOUNTER — OFFICE VISIT - HEALTHEAST (OUTPATIENT)
Dept: FAMILY MEDICINE | Facility: CLINIC | Age: 30
End: 2020-01-08

## 2020-01-08 DIAGNOSIS — N83.201 RIGHT OVARIAN CYST: ICD-10-CM

## 2020-01-08 DIAGNOSIS — F31.60 BIPOLAR AFFECTIVE DISORDER, CURRENT EPISODE MIXED, CURRENT EPISODE SEVERITY UNSPECIFIED (H): ICD-10-CM

## 2020-01-08 DIAGNOSIS — Z01.818 PREOP GENERAL PHYSICAL EXAM: ICD-10-CM

## 2020-01-08 LAB
FERRITIN SERPL-MCNC: 33 NG/ML (ref 10–130)
HCG UR QL: NEGATIVE
HGB BLD-MCNC: 13.8 G/DL (ref 12–16)

## 2020-01-08 ASSESSMENT — MIFFLIN-ST. JEOR: SCORE: 1155.76

## 2020-01-09 ENCOUNTER — ANESTHESIA - HEALTHEAST (OUTPATIENT)
Dept: SURGERY | Facility: AMBULATORY SURGERY CENTER | Age: 30
End: 2020-01-09

## 2020-01-10 ENCOUNTER — SURGERY - HEALTHEAST (OUTPATIENT)
Dept: SURGERY | Facility: AMBULATORY SURGERY CENTER | Age: 30
End: 2020-01-10

## 2020-01-19 ENCOUNTER — OFFICE VISIT - HEALTHEAST (OUTPATIENT)
Dept: FAMILY MEDICINE | Facility: CLINIC | Age: 30
End: 2020-01-19

## 2020-01-19 DIAGNOSIS — Z11.3 SCREEN FOR STD (SEXUALLY TRANSMITTED DISEASE): ICD-10-CM

## 2020-01-19 DIAGNOSIS — N89.8 ITCHING IN THE VAGINAL AREA: ICD-10-CM

## 2020-01-19 DIAGNOSIS — B96.89 BACTERIAL VAGINOSIS: ICD-10-CM

## 2020-01-19 DIAGNOSIS — N76.0 BACTERIAL VAGINOSIS: ICD-10-CM

## 2020-01-19 LAB
ALBUMIN UR-MCNC: NEGATIVE MG/DL
APPEARANCE UR: CLEAR
BILIRUB UR QL STRIP: NEGATIVE
CLUE CELLS: ABNORMAL
COLOR UR AUTO: YELLOW
GLUCOSE UR STRIP-MCNC: NEGATIVE MG/DL
HGB UR QL STRIP: NEGATIVE
HIV 1+2 AB+HIV1 P24 AG SERPL QL IA: NEGATIVE
KETONES UR STRIP-MCNC: NEGATIVE MG/DL
LEUKOCYTE ESTERASE UR QL STRIP: NEGATIVE
NITRATE UR QL: NEGATIVE
PH UR STRIP: 6 [PH] (ref 5–8)
SP GR UR STRIP: 1.02 (ref 1–1.03)
TRICHOMONAS, WET PREP: ABNORMAL
UROBILINOGEN UR STRIP-ACNC: NORMAL
YEAST, WET PREP: ABNORMAL

## 2020-01-19 ASSESSMENT — MIFFLIN-ST. JEOR: SCORE: 1158.03

## 2020-01-20 LAB
C TRACH DNA SPEC QL PROBE+SIG AMP: NEGATIVE
HCV AB SERPL QL IA: NEGATIVE
N GONORRHOEA DNA SPEC QL NAA+PROBE: NEGATIVE
T PALLIDUM AB SER QL: NEGATIVE

## 2020-02-04 ENCOUNTER — COMMUNICATION - HEALTHEAST (OUTPATIENT)
Dept: FAMILY MEDICINE | Facility: CLINIC | Age: 30
End: 2020-02-04

## 2020-02-04 DIAGNOSIS — E05.00 GRAVES DISEASE: ICD-10-CM

## 2020-02-10 ENCOUNTER — OFFICE VISIT - HEALTHEAST (OUTPATIENT)
Dept: FAMILY MEDICINE | Facility: CLINIC | Age: 30
End: 2020-02-10

## 2020-02-10 DIAGNOSIS — N89.8 VAGINAL DISCHARGE: ICD-10-CM

## 2020-02-10 DIAGNOSIS — R30.0 DYSURIA: ICD-10-CM

## 2020-02-10 LAB
ALBUMIN UR-MCNC: ABNORMAL MG/DL
APPEARANCE UR: CLEAR
BACTERIA #/AREA URNS HPF: ABNORMAL HPF
BILIRUB UR QL STRIP: NEGATIVE
CLUE CELLS: NORMAL
COLOR UR AUTO: YELLOW
GLUCOSE UR STRIP-MCNC: NEGATIVE MG/DL
HGB UR QL STRIP: ABNORMAL
KETONES UR STRIP-MCNC: NEGATIVE MG/DL
LEUKOCYTE ESTERASE UR QL STRIP: NEGATIVE
NITRATE UR QL: NEGATIVE
PH UR STRIP: 6 [PH] (ref 5–8)
RBC #/AREA URNS AUTO: ABNORMAL HPF
SP GR UR STRIP: 1.02 (ref 1–1.03)
SQUAMOUS #/AREA URNS AUTO: ABNORMAL LPF
TRICHOMONAS, WET PREP: NORMAL
UROBILINOGEN UR STRIP-ACNC: ABNORMAL
WBC #/AREA URNS AUTO: ABNORMAL HPF
YEAST, WET PREP: NORMAL

## 2020-02-12 ENCOUNTER — COMMUNICATION - HEALTHEAST (OUTPATIENT)
Dept: FAMILY MEDICINE | Facility: CLINIC | Age: 30
End: 2020-02-12

## 2020-02-12 DIAGNOSIS — G43.001 MIGRAINE WITHOUT AURA AND WITH STATUS MIGRAINOSUS, NOT INTRACTABLE: ICD-10-CM

## 2020-02-12 LAB — BACTERIA SPEC CULT: NO GROWTH

## 2020-02-16 ENCOUNTER — COMMUNICATION - HEALTHEAST (OUTPATIENT)
Dept: FAMILY MEDICINE | Facility: CLINIC | Age: 30
End: 2020-02-16

## 2020-02-19 ENCOUNTER — COMMUNICATION - HEALTHEAST (OUTPATIENT)
Dept: FAMILY MEDICINE | Facility: CLINIC | Age: 30
End: 2020-02-19

## 2020-02-19 DIAGNOSIS — G43.001 MIGRAINE WITHOUT AURA AND WITH STATUS MIGRAINOSUS, NOT INTRACTABLE: ICD-10-CM

## 2020-03-05 ENCOUNTER — COMMUNICATION - HEALTHEAST (OUTPATIENT)
Dept: FAMILY MEDICINE | Facility: CLINIC | Age: 30
End: 2020-03-05

## 2020-03-05 DIAGNOSIS — E05.00 GRAVES DISEASE: ICD-10-CM

## 2020-04-03 ENCOUNTER — NURSE TRIAGE (OUTPATIENT)
Dept: NURSING | Facility: CLINIC | Age: 30
End: 2020-04-03

## 2020-04-03 NOTE — TELEPHONE ENCOUNTER
Caller is currently sitting in [facility]. Was advised by a staff person to first call [facility] for triage.   Currently Caller states she will call back re her symptoms of potential sinus infection.     Protocol-  Information Only  Care advice reviewed.   Disposition-  Information given.  Caller states understanding of the recommended disposition.   Advised to call back if further questions or concerns.     ANUPAMA FranklinN RN  [facility]       COVID 19 Nurse Triage Plan/Patient Instructions    Please be aware that novel coronavirus (COVID-19) may be circulating in the community. If you develop symptoms such as fever, cough, or SOB or if you have concerns about the presence of another infection including coronavirus (COVID-19), please contact your health care provider or visit [email or web address].     Disposition/Instructions  Thank you for limiting contact with others, wearing a simple mask to cover your cough, practice good hand hygiene habits and accessing our virtual services where possible to limit the spread of this virus.    For more information about COVID19 and options for caring for yourself at home, please visit the CDC website at https://www.cdc.gov/coronavirus/2019-ncov/about/steps-when-sick.html  For more options for care at [facility], please visit our website at [email or web address]    For more information, please use the [facility] of Health ([facility]) COVID-19 Hotlines (Interpreters available):     Health questions: Phone Number: [phone] or [phone] and Hours: 7 a.m. to 7 p.m.    Schools and  questions: Phone Number: [phone] or [phone] and Hours 7 a.m. to 7 p.m.

## 2020-04-04 ENCOUNTER — COMMUNICATION - HEALTHEAST (OUTPATIENT)
Dept: FAMILY MEDICINE | Facility: CLINIC | Age: 30
End: 2020-04-04

## 2020-04-04 DIAGNOSIS — E05.00 GRAVES DISEASE: ICD-10-CM

## 2020-04-04 DIAGNOSIS — G43.001 MIGRAINE WITHOUT AURA AND WITH STATUS MIGRAINOSUS, NOT INTRACTABLE: ICD-10-CM

## 2020-04-07 ENCOUNTER — OFFICE VISIT - HEALTHEAST (OUTPATIENT)
Dept: FAMILY MEDICINE | Facility: CLINIC | Age: 30
End: 2020-04-07

## 2020-04-07 DIAGNOSIS — B96.89 BACTERIAL VAGINOSIS: ICD-10-CM

## 2020-04-07 DIAGNOSIS — Z20.818 EXPOSURE TO STREPTOCOCCAL PHARYNGITIS: ICD-10-CM

## 2020-04-07 DIAGNOSIS — N76.0 BACTERIAL VAGINOSIS: ICD-10-CM

## 2020-04-07 DIAGNOSIS — R11.0 NAUSEA: ICD-10-CM

## 2020-04-07 DIAGNOSIS — R50.9 FEVER, UNSPECIFIED FEVER CAUSE: ICD-10-CM

## 2020-04-07 DIAGNOSIS — G43.001 MIGRAINE WITHOUT AURA AND WITH STATUS MIGRAINOSUS, NOT INTRACTABLE: ICD-10-CM

## 2020-04-07 DIAGNOSIS — J02.9 SORE THROAT: ICD-10-CM

## 2020-04-08 ENCOUNTER — COMMUNICATION - HEALTHEAST (OUTPATIENT)
Dept: FAMILY MEDICINE | Facility: CLINIC | Age: 30
End: 2020-04-08

## 2020-04-08 DIAGNOSIS — N76.0 BACTERIAL VAGINOSIS: ICD-10-CM

## 2020-04-08 DIAGNOSIS — J02.0 STREP THROAT: ICD-10-CM

## 2020-04-08 DIAGNOSIS — B96.89 BACTERIAL VAGINOSIS: ICD-10-CM

## 2020-04-22 ENCOUNTER — COMMUNICATION - HEALTHEAST (OUTPATIENT)
Dept: FAMILY MEDICINE | Facility: CLINIC | Age: 30
End: 2020-04-22

## 2020-04-23 ENCOUNTER — OFFICE VISIT - HEALTHEAST (OUTPATIENT)
Dept: FAMILY MEDICINE | Facility: CLINIC | Age: 30
End: 2020-04-23

## 2020-04-23 DIAGNOSIS — N76.0 ACUTE VAGINITIS: ICD-10-CM

## 2020-04-23 ASSESSMENT — PATIENT HEALTH QUESTIONNAIRE - PHQ9: SUM OF ALL RESPONSES TO PHQ QUESTIONS 1-9: 11

## 2020-05-02 ENCOUNTER — COMMUNICATION - HEALTHEAST (OUTPATIENT)
Dept: BEHAVIORAL HEALTH | Facility: CLINIC | Age: 30
End: 2020-05-02

## 2020-05-02 DIAGNOSIS — F31.81 BIPOLAR 2 DISORDER, MAJOR DEPRESSIVE EPISODE (H): ICD-10-CM

## 2020-05-04 ENCOUNTER — COMMUNICATION - HEALTHEAST (OUTPATIENT)
Dept: BEHAVIORAL HEALTH | Facility: CLINIC | Age: 30
End: 2020-05-04

## 2020-05-04 ENCOUNTER — COMMUNICATION - HEALTHEAST (OUTPATIENT)
Dept: FAMILY MEDICINE | Facility: CLINIC | Age: 30
End: 2020-05-04

## 2020-05-04 DIAGNOSIS — E05.00 GRAVES DISEASE: ICD-10-CM

## 2020-05-05 ENCOUNTER — COMMUNICATION - HEALTHEAST (OUTPATIENT)
Dept: BEHAVIORAL HEALTH | Facility: CLINIC | Age: 30
End: 2020-05-05

## 2020-05-05 DIAGNOSIS — F31.81 BIPOLAR 2 DISORDER, MAJOR DEPRESSIVE EPISODE (H): ICD-10-CM

## 2020-05-11 ENCOUNTER — OFFICE VISIT - HEALTHEAST (OUTPATIENT)
Dept: BEHAVIORAL HEALTH | Facility: CLINIC | Age: 30
End: 2020-05-11

## 2020-05-11 DIAGNOSIS — F43.10 PTSD (POST-TRAUMATIC STRESS DISORDER): ICD-10-CM

## 2020-05-11 DIAGNOSIS — F33.2 MDD (MAJOR DEPRESSIVE DISORDER), RECURRENT SEVERE, WITHOUT PSYCHOSIS (H): ICD-10-CM

## 2020-05-11 DIAGNOSIS — F31.81 BIPOLAR 2 DISORDER, MAJOR DEPRESSIVE EPISODE (H): ICD-10-CM

## 2020-05-11 DIAGNOSIS — F41.1 GENERALIZED ANXIETY DISORDER: ICD-10-CM

## 2020-05-11 ASSESSMENT — ANXIETY QUESTIONNAIRES
3. WORRYING TOO MUCH ABOUT DIFFERENT THINGS: NEARLY EVERY DAY
7. FEELING AFRAID AS IF SOMETHING AWFUL MIGHT HAPPEN: NEARLY EVERY DAY
6. BECOMING EASILY ANNOYED OR IRRITABLE: MORE THAN HALF THE DAYS
2. NOT BEING ABLE TO STOP OR CONTROL WORRYING: NEARLY EVERY DAY
4. TROUBLE RELAXING: NEARLY EVERY DAY
5. BEING SO RESTLESS THAT IT IS HARD TO SIT STILL: NEARLY EVERY DAY
GAD7 TOTAL SCORE: 20
1. FEELING NERVOUS, ANXIOUS, OR ON EDGE: NEARLY EVERY DAY

## 2020-05-11 ASSESSMENT — PATIENT HEALTH QUESTIONNAIRE - PHQ9: SUM OF ALL RESPONSES TO PHQ QUESTIONS 1-9: 15

## 2020-06-03 ENCOUNTER — COMMUNICATION - HEALTHEAST (OUTPATIENT)
Dept: FAMILY MEDICINE | Facility: CLINIC | Age: 30
End: 2020-06-03

## 2020-06-03 DIAGNOSIS — E05.00 GRAVES DISEASE: ICD-10-CM

## 2020-07-03 ENCOUNTER — COMMUNICATION - HEALTHEAST (OUTPATIENT)
Dept: FAMILY MEDICINE | Facility: CLINIC | Age: 30
End: 2020-07-03

## 2020-07-03 DIAGNOSIS — E05.00 GRAVES DISEASE: ICD-10-CM

## 2020-07-03 DIAGNOSIS — G43.001 MIGRAINE WITHOUT AURA AND WITH STATUS MIGRAINOSUS, NOT INTRACTABLE: ICD-10-CM

## 2020-07-16 ENCOUNTER — AMBULATORY - HEALTHEAST (OUTPATIENT)
Dept: LAB | Facility: CLINIC | Age: 30
End: 2020-07-16

## 2020-07-16 ENCOUNTER — AMBULATORY - HEALTHEAST (OUTPATIENT)
Dept: FAMILY MEDICINE | Facility: CLINIC | Age: 30
End: 2020-07-16

## 2020-07-16 DIAGNOSIS — E05.90 HYPERTHYROIDISM: ICD-10-CM

## 2020-07-16 LAB
PTH-INTACT SERPL-MCNC: 61 PG/ML (ref 10–86)
T3 SERPL-MCNC: 93 NG/DL (ref 45–175)
T4 FREE SERPL-MCNC: 0.7 NG/DL (ref 0.7–1.8)
TSH SERPL DL<=0.005 MIU/L-ACNC: 5.04 UIU/ML (ref 0.3–5)

## 2020-07-17 LAB
25(OH)D3 SERPL-MCNC: 24 NG/ML (ref 30–80)
25(OH)D3 SERPL-MCNC: 24 NG/ML (ref 30–80)

## 2020-07-18 ENCOUNTER — AMBULATORY - HEALTHEAST (OUTPATIENT)
Dept: FAMILY MEDICINE | Facility: CLINIC | Age: 30
End: 2020-07-18

## 2020-07-21 ENCOUNTER — COMMUNICATION - HEALTHEAST (OUTPATIENT)
Dept: FAMILY MEDICINE | Facility: CLINIC | Age: 30
End: 2020-07-21

## 2020-07-27 ENCOUNTER — COMMUNICATION - HEALTHEAST (OUTPATIENT)
Dept: FAMILY MEDICINE | Facility: CLINIC | Age: 30
End: 2020-07-27

## 2020-08-02 ENCOUNTER — COMMUNICATION - HEALTHEAST (OUTPATIENT)
Dept: FAMILY MEDICINE | Facility: CLINIC | Age: 30
End: 2020-08-02

## 2020-08-02 DIAGNOSIS — E05.00 GRAVES DISEASE: ICD-10-CM

## 2020-08-10 ENCOUNTER — OFFICE VISIT - HEALTHEAST (OUTPATIENT)
Dept: BEHAVIORAL HEALTH | Facility: CLINIC | Age: 30
End: 2020-08-10

## 2020-08-10 DIAGNOSIS — F41.1 GENERALIZED ANXIETY DISORDER: ICD-10-CM

## 2020-08-10 DIAGNOSIS — F43.10 PTSD (POST-TRAUMATIC STRESS DISORDER): ICD-10-CM

## 2020-08-10 DIAGNOSIS — F33.2 MDD (MAJOR DEPRESSIVE DISORDER), RECURRENT SEVERE, WITHOUT PSYCHOSIS (H): ICD-10-CM

## 2020-08-28 ENCOUNTER — COMMUNICATION - HEALTHEAST (OUTPATIENT)
Dept: FAMILY MEDICINE | Facility: CLINIC | Age: 30
End: 2020-08-28

## 2020-08-28 DIAGNOSIS — N76.0 ACUTE VAGINITIS: ICD-10-CM

## 2020-09-11 ENCOUNTER — COMMUNICATION - HEALTHEAST (OUTPATIENT)
Dept: BEHAVIORAL HEALTH | Facility: CLINIC | Age: 30
End: 2020-09-11

## 2020-10-01 ENCOUNTER — COMMUNICATION - HEALTHEAST (OUTPATIENT)
Dept: FAMILY MEDICINE | Facility: CLINIC | Age: 30
End: 2020-10-01

## 2020-10-01 DIAGNOSIS — G43.001 MIGRAINE WITHOUT AURA AND WITH STATUS MIGRAINOSUS, NOT INTRACTABLE: ICD-10-CM

## 2020-10-06 ENCOUNTER — COMMUNICATION - HEALTHEAST (OUTPATIENT)
Dept: BEHAVIORAL HEALTH | Facility: CLINIC | Age: 30
End: 2020-10-06

## 2020-10-16 ENCOUNTER — OFFICE VISIT - HEALTHEAST (OUTPATIENT)
Dept: FAMILY MEDICINE | Facility: CLINIC | Age: 30
End: 2020-10-16

## 2020-10-16 DIAGNOSIS — H60.392 OTHER INFECTIVE ACUTE OTITIS EXTERNA OF LEFT EAR: ICD-10-CM

## 2020-10-16 DIAGNOSIS — H66.002 NON-RECURRENT ACUTE SUPPURATIVE OTITIS MEDIA OF LEFT EAR WITHOUT SPONTANEOUS RUPTURE OF TYMPANIC MEMBRANE: ICD-10-CM

## 2020-11-01 ENCOUNTER — COMMUNICATION - HEALTHEAST (OUTPATIENT)
Dept: BEHAVIORAL HEALTH | Facility: CLINIC | Age: 30
End: 2020-11-01

## 2020-11-01 DIAGNOSIS — F31.81 BIPOLAR 2 DISORDER, MAJOR DEPRESSIVE EPISODE (H): ICD-10-CM

## 2020-11-02 ENCOUNTER — COMMUNICATION - HEALTHEAST (OUTPATIENT)
Dept: FAMILY MEDICINE | Facility: CLINIC | Age: 30
End: 2020-11-02

## 2020-11-02 ENCOUNTER — COMMUNICATION - HEALTHEAST (OUTPATIENT)
Dept: SCHEDULING | Facility: CLINIC | Age: 30
End: 2020-11-02

## 2020-11-02 ENCOUNTER — AMBULATORY - HEALTHEAST (OUTPATIENT)
Dept: FAMILY MEDICINE | Facility: CLINIC | Age: 30
End: 2020-11-02

## 2020-11-02 DIAGNOSIS — H66.90 SUBACUTE OTITIS MEDIA, UNSPECIFIED OTITIS MEDIA TYPE: ICD-10-CM

## 2020-11-02 DIAGNOSIS — G43.001 MIGRAINE WITHOUT AURA AND WITH STATUS MIGRAINOSUS, NOT INTRACTABLE: ICD-10-CM

## 2020-11-04 DIAGNOSIS — G43.001 MIGRAINE WITHOUT AURA AND WITH STATUS MIGRAINOSUS, NOT INTRACTABLE: Primary | ICD-10-CM

## 2020-11-04 RX ORDER — TOPIRAMATE 25 MG/1
37.5 TABLET, FILM COATED ORAL DAILY
Qty: 45 TABLET | Refills: 0 | Status: SHIPPED | OUTPATIENT
Start: 2020-11-04 | End: 2020-12-30

## 2020-11-04 RX ORDER — TOPIRAMATE 25 MG/1
1.5 TABLET, FILM COATED ORAL DAILY
COMMUNITY
Start: 2020-04-05 | End: 2020-11-04

## 2020-11-04 NOTE — TELEPHONE ENCOUNTER
Refill request for Topamax  Letter mailed to patient to schedule follow up appt  Medication T'd for review and signature  Ranjana Moody CMA on 11/4/2020 at 10:07 AM

## 2020-11-04 NOTE — LETTER
11/4/2020        RE: Tara Sorenson  [address]  [place] [address]          Dear Tara,      We recently provided you with medication refills.  Many medications require routine follow-up with your doctor.    Your prescription(s) have been refilled for 30 days so you may have time for the above noted follow-up. Please call to schedule soon so we can assure you have an appointment before your next refills are needed. If you have already made a follow up appointment, please disregard this letter.           Sincerely,        Rahul Wright MD  [facility]     (Formerly known as [facility])

## 2020-11-20 ENCOUNTER — OFFICE VISIT - HEALTHEAST (OUTPATIENT)
Dept: FAMILY MEDICINE | Facility: CLINIC | Age: 30
End: 2020-11-20

## 2020-11-20 DIAGNOSIS — R09.81 NASAL CONGESTION: ICD-10-CM

## 2020-11-20 DIAGNOSIS — H92.02 LEFT EAR PAIN: ICD-10-CM

## 2020-11-24 ENCOUNTER — OFFICE VISIT - HEALTHEAST (OUTPATIENT)
Dept: FAMILY MEDICINE | Facility: CLINIC | Age: 30
End: 2020-11-24

## 2020-11-24 DIAGNOSIS — G43.001 MIGRAINE WITHOUT AURA AND WITH STATUS MIGRAINOSUS, NOT INTRACTABLE: ICD-10-CM

## 2020-11-24 DIAGNOSIS — H69.92 DYSFUNCTION OF LEFT EUSTACHIAN TUBE: ICD-10-CM

## 2020-11-24 DIAGNOSIS — N76.0 ACUTE VAGINITIS: ICD-10-CM

## 2020-11-24 DIAGNOSIS — Z20.822 EXPOSURE TO COVID-19 VIRUS: ICD-10-CM

## 2020-12-30 ENCOUNTER — COMMUNICATION - HEALTHEAST (OUTPATIENT)
Dept: FAMILY MEDICINE | Facility: CLINIC | Age: 30
End: 2020-12-30

## 2020-12-30 DIAGNOSIS — G43.001 MIGRAINE WITHOUT AURA AND WITH STATUS MIGRAINOSUS, NOT INTRACTABLE: ICD-10-CM

## 2020-12-30 RX ORDER — TOPIRAMATE 25 MG/1
37.5 TABLET, FILM COATED ORAL DAILY
Qty: 21 TABLET | Refills: 0 | Status: SHIPPED | OUTPATIENT
Start: 2020-12-30 | End: 2021-12-20

## 2020-12-30 NOTE — TELEPHONE ENCOUNTER
Refill request for Topamax  Patient has not scheduled follow up  14 day supply with 0 refills Medication T'd for review and signature  Ranjana Moody CMA on 12/30/2020 at 2:18 PM

## 2021-01-13 ENCOUNTER — OFFICE VISIT - HEALTHEAST (OUTPATIENT)
Dept: BEHAVIORAL HEALTH | Facility: CLINIC | Age: 31
End: 2021-01-13

## 2021-01-13 DIAGNOSIS — F60.3 BORDERLINE PERSONALITY DISORDER (H): ICD-10-CM

## 2021-01-13 ASSESSMENT — PATIENT HEALTH QUESTIONNAIRE - PHQ9: SUM OF ALL RESPONSES TO PHQ QUESTIONS 1-9: 17

## 2021-01-13 ASSESSMENT — ANXIETY QUESTIONNAIRES
2. NOT BEING ABLE TO STOP OR CONTROL WORRYING: NEARLY EVERY DAY
5. BEING SO RESTLESS THAT IT IS HARD TO SIT STILL: NEARLY EVERY DAY
4. TROUBLE RELAXING: NEARLY EVERY DAY
GAD7 TOTAL SCORE: 18
6. BECOMING EASILY ANNOYED OR IRRITABLE: SEVERAL DAYS
3. WORRYING TOO MUCH ABOUT DIFFERENT THINGS: NEARLY EVERY DAY
7. FEELING AFRAID AS IF SOMETHING AWFUL MIGHT HAPPEN: NEARLY EVERY DAY
1. FEELING NERVOUS, ANXIOUS, OR ON EDGE: MORE THAN HALF THE DAYS
IF YOU CHECKED OFF ANY PROBLEMS ON THIS QUESTIONNAIRE, HOW DIFFICULT HAVE THESE PROBLEMS MADE IT FOR YOU TO DO YOUR WORK, TAKE CARE OF THINGS AT HOME, OR GET ALONG WITH OTHER PEOPLE: SOMEWHAT DIFFICULT

## 2021-01-23 ENCOUNTER — OFFICE VISIT - HEALTHEAST (OUTPATIENT)
Dept: FAMILY MEDICINE | Facility: CLINIC | Age: 31
End: 2021-01-23

## 2021-01-23 DIAGNOSIS — B37.31 CANDIDIASIS OF VAGINA: ICD-10-CM

## 2021-01-23 DIAGNOSIS — B96.89 BV (BACTERIAL VAGINOSIS): ICD-10-CM

## 2021-01-23 DIAGNOSIS — N89.8 VAGINAL DISCHARGE: ICD-10-CM

## 2021-01-23 DIAGNOSIS — N89.8 VAGINAL DRYNESS: ICD-10-CM

## 2021-01-23 DIAGNOSIS — Z11.3 SCREEN FOR STD (SEXUALLY TRANSMITTED DISEASE): ICD-10-CM

## 2021-01-23 DIAGNOSIS — N76.0 BV (BACTERIAL VAGINOSIS): ICD-10-CM

## 2021-01-23 DIAGNOSIS — H66.002 ACUTE SUPPURATIVE OTITIS MEDIA OF LEFT EAR WITHOUT SPONTANEOUS RUPTURE OF TYMPANIC MEMBRANE, RECURRENCE NOT SPECIFIED: ICD-10-CM

## 2021-01-23 LAB
CLUE CELLS: ABNORMAL
HBV SURFACE AG SERPL QL IA: NEGATIVE
HIV 1+2 AB+HIV1 P24 AG SERPL QL IA: NEGATIVE
TRICHOMONAS, WET PREP: ABNORMAL
YEAST, WET PREP: ABNORMAL

## 2021-01-24 LAB — T PALLIDUM AB SER QL: NEGATIVE

## 2021-01-25 LAB — HCV AB SERPL QL IA: NEGATIVE

## 2021-01-26 LAB
C TRACH DNA SPEC QL PROBE+SIG AMP: NEGATIVE
N GONORRHOEA DNA SPEC QL NAA+PROBE: NEGATIVE

## 2021-02-02 ENCOUNTER — COMMUNICATION - HEALTHEAST (OUTPATIENT)
Dept: FAMILY MEDICINE | Facility: CLINIC | Age: 31
End: 2021-02-02

## 2021-02-02 DIAGNOSIS — R09.81 NASAL CONGESTION: ICD-10-CM

## 2021-02-08 ENCOUNTER — COMMUNICATION - HEALTHEAST (OUTPATIENT)
Dept: SCHEDULING | Facility: CLINIC | Age: 31
End: 2021-02-08

## 2021-02-09 ENCOUNTER — OFFICE VISIT - HEALTHEAST (OUTPATIENT)
Dept: FAMILY MEDICINE | Facility: CLINIC | Age: 31
End: 2021-02-09

## 2021-02-09 DIAGNOSIS — B37.31 YEAST INFECTION OF THE VAGINA: ICD-10-CM

## 2021-02-09 DIAGNOSIS — E05.00 GRAVES DISEASE: ICD-10-CM

## 2021-02-09 DIAGNOSIS — B96.89 BACTERIAL VAGINOSIS: ICD-10-CM

## 2021-02-09 DIAGNOSIS — F31.81 BIPOLAR 2 DISORDER, MAJOR DEPRESSIVE EPISODE (H): ICD-10-CM

## 2021-02-09 DIAGNOSIS — G43.001 MIGRAINE WITHOUT AURA AND WITH STATUS MIGRAINOSUS, NOT INTRACTABLE: ICD-10-CM

## 2021-02-09 DIAGNOSIS — F15.10 OTHER STIMULANT ABUSE, UNCOMPLICATED (H): ICD-10-CM

## 2021-02-09 DIAGNOSIS — R93.89 ABNORMAL IMAGING OF THYROID: ICD-10-CM

## 2021-02-09 DIAGNOSIS — F33.2 MDD (MAJOR DEPRESSIVE DISORDER), RECURRENT SEVERE, WITHOUT PSYCHOSIS (H): ICD-10-CM

## 2021-02-09 DIAGNOSIS — N76.0 BACTERIAL VAGINOSIS: ICD-10-CM

## 2021-02-09 LAB
ALBUMIN SERPL-MCNC: 4.4 G/DL (ref 3.5–5)
ALP SERPL-CCNC: 50 U/L (ref 45–120)
ALT SERPL W P-5'-P-CCNC: 12 U/L (ref 0–45)
ANION GAP SERPL CALCULATED.3IONS-SCNC: 9 MMOL/L (ref 5–18)
AST SERPL W P-5'-P-CCNC: 19 U/L (ref 0–40)
BILIRUB SERPL-MCNC: 0.4 MG/DL (ref 0–1)
BUN SERPL-MCNC: 14 MG/DL (ref 8–22)
CALCIUM SERPL-MCNC: 8.9 MG/DL (ref 8.5–10.5)
CHLORIDE BLD-SCNC: 106 MMOL/L (ref 98–107)
CO2 SERPL-SCNC: 25 MMOL/L (ref 22–31)
CREAT SERPL-MCNC: 0.73 MG/DL (ref 0.6–1.1)
ERYTHROCYTE [DISTWIDTH] IN BLOOD BY AUTOMATED COUNT: 12 % (ref 11–14.5)
GFR SERPL CREATININE-BSD FRML MDRD: >60 ML/MIN/1.73M2
GLUCOSE BLD-MCNC: 80 MG/DL (ref 70–125)
HCT VFR BLD AUTO: 39.6 % (ref 35–47)
HGB BLD-MCNC: 13.1 G/DL (ref 12–16)
MCH RBC QN AUTO: 31.3 PG (ref 27–34)
MCHC RBC AUTO-ENTMCNC: 33.1 G/DL (ref 32–36)
MCV RBC AUTO: 95 FL (ref 80–100)
PLATELET # BLD AUTO: 251 THOU/UL (ref 140–440)
PMV BLD AUTO: 9.5 FL (ref 7–10)
POTASSIUM BLD-SCNC: 4.2 MMOL/L (ref 3.5–5)
PROT SERPL-MCNC: 6.8 G/DL (ref 6–8)
RBC # BLD AUTO: 4.18 MILL/UL (ref 3.8–5.4)
SODIUM SERPL-SCNC: 140 MMOL/L (ref 136–145)
T4 FREE SERPL-MCNC: 1 NG/DL (ref 0.7–1.8)
T4 TOTAL - HISTORICAL: 8.2 UG/DL (ref 4.5–13)
TSH SERPL DL<=0.005 MIU/L-ACNC: 0.63 UIU/ML (ref 0.3–5)
WBC: 5.9 THOU/UL (ref 4–11)

## 2021-02-11 LAB — THYROID STIMULATING IMMUNOGLOBULIN-TSI - HISTORICAL: <0.1 IU/L

## 2021-02-24 ENCOUNTER — HOSPITAL ENCOUNTER (OUTPATIENT)
Dept: ULTRASOUND IMAGING | Facility: CLINIC | Age: 31
Discharge: HOME | End: 2021-02-24
Attending: FAMILY MEDICINE

## 2021-02-24 DIAGNOSIS — R93.89 ABNORMAL IMAGING OF THYROID: ICD-10-CM

## 2021-02-26 ENCOUNTER — COMMUNICATION - HEALTHEAST (OUTPATIENT)
Dept: CARE COORDINATION | Facility: CLINIC | Age: 31
End: 2021-02-26

## 2021-02-27 ENCOUNTER — COMMUNICATION - HEALTHEAST (OUTPATIENT)
Dept: FAMILY MEDICINE | Facility: CLINIC | Age: 31
End: 2021-02-27

## 2021-04-04 ENCOUNTER — COMMUNICATION - HEALTHEAST (OUTPATIENT)
Dept: FAMILY MEDICINE | Facility: CLINIC | Age: 31
End: 2021-04-04

## 2021-04-04 DIAGNOSIS — R09.81 NASAL CONGESTION: ICD-10-CM

## 2021-04-09 ENCOUNTER — COMMUNICATION - HEALTHEAST (OUTPATIENT)
Dept: FAMILY MEDICINE | Facility: CLINIC | Age: 31
End: 2021-04-09

## 2021-04-09 DIAGNOSIS — G43.001 MIGRAINE WITHOUT AURA AND WITH STATUS MIGRAINOSUS, NOT INTRACTABLE: ICD-10-CM

## 2021-04-12 ENCOUNTER — COMMUNICATION - HEALTHEAST (OUTPATIENT)
Dept: FAMILY MEDICINE | Facility: CLINIC | Age: 31
End: 2021-04-12

## 2021-04-12 ENCOUNTER — AMBULATORY - HEALTHEAST (OUTPATIENT)
Dept: FAMILY MEDICINE | Facility: CLINIC | Age: 31
End: 2021-04-12

## 2021-04-13 ENCOUNTER — AMBULATORY - HEALTHEAST (OUTPATIENT)
Dept: INTERNAL MEDICINE | Facility: CLINIC | Age: 31
End: 2021-04-13

## 2021-04-13 DIAGNOSIS — F98.8 ADD (ATTENTION DEFICIT DISORDER) WITHOUT HYPERACTIVITY: ICD-10-CM

## 2021-04-13 DIAGNOSIS — B96.89 BV (BACTERIAL VAGINOSIS): ICD-10-CM

## 2021-04-13 DIAGNOSIS — N76.0 BV (BACTERIAL VAGINOSIS): ICD-10-CM

## 2021-04-23 ENCOUNTER — OFFICE VISIT - HEALTHEAST (OUTPATIENT)
Dept: FAMILY MEDICINE | Facility: CLINIC | Age: 31
End: 2021-04-23

## 2021-04-23 ENCOUNTER — COMMUNICATION - HEALTHEAST (OUTPATIENT)
Dept: FAMILY MEDICINE | Facility: CLINIC | Age: 31
End: 2021-04-23

## 2021-04-23 DIAGNOSIS — F90.9 ATTENTION DEFICIT HYPERACTIVITY DISORDER (ADHD), UNSPECIFIED ADHD TYPE: ICD-10-CM

## 2021-04-23 DIAGNOSIS — G47.00 INSOMNIA, UNSPECIFIED TYPE: ICD-10-CM

## 2021-04-26 ENCOUNTER — OFFICE VISIT - HEALTHEAST (OUTPATIENT)
Dept: FAMILY MEDICINE | Facility: CLINIC | Age: 31
End: 2021-04-26

## 2021-04-26 DIAGNOSIS — G47.09 OTHER INSOMNIA: ICD-10-CM

## 2021-04-26 DIAGNOSIS — G43.119 INTRACTABLE MIGRAINE WITH AURA WITHOUT STATUS MIGRAINOSUS: ICD-10-CM

## 2021-05-10 ENCOUNTER — OFFICE VISIT - HEALTHEAST (OUTPATIENT)
Dept: FAMILY MEDICINE | Facility: CLINIC | Age: 31
End: 2021-05-10

## 2021-05-10 DIAGNOSIS — H57.12 EYE PAIN, LEFT: ICD-10-CM

## 2021-05-10 DIAGNOSIS — H10.32 ACUTE BACTERIAL CONJUNCTIVITIS OF LEFT EYE: ICD-10-CM

## 2021-05-24 ENCOUNTER — AMBULATORY - HEALTHEAST (OUTPATIENT)
Dept: FAMILY MEDICINE | Facility: CLINIC | Age: 31
End: 2021-05-24

## 2021-05-24 ENCOUNTER — COMMUNICATION - HEALTHEAST (OUTPATIENT)
Dept: INTERNAL MEDICINE | Facility: CLINIC | Age: 31
End: 2021-05-24

## 2021-05-24 DIAGNOSIS — H00.019 HORDEOLUM EXTERNUM, UNSPECIFIED LATERALITY: ICD-10-CM

## 2021-05-26 ASSESSMENT — PATIENT HEALTH QUESTIONNAIRE - PHQ9
SUM OF ALL RESPONSES TO PHQ QUESTIONS 1-9: 20
SUM OF ALL RESPONSES TO PHQ QUESTIONS 1-9: 23
SUM OF ALL RESPONSES TO PHQ QUESTIONS 1-9: 21
SUM OF ALL RESPONSES TO PHQ QUESTIONS 1-9: 19
SUM OF ALL RESPONSES TO PHQ QUESTIONS 1-9: 11
SUM OF ALL RESPONSES TO PHQ QUESTIONS 1-9: 15

## 2021-05-27 ENCOUNTER — COMMUNICATION - HEALTHEAST (OUTPATIENT)
Dept: FAMILY MEDICINE | Facility: CLINIC | Age: 31
End: 2021-05-27

## 2021-05-27 VITALS
OXYGEN SATURATION: 96 % | DIASTOLIC BLOOD PRESSURE: 63 MMHG | SYSTOLIC BLOOD PRESSURE: 101 MMHG | TEMPERATURE: 98.2 F | HEART RATE: 79 BPM | RESPIRATION RATE: 16 BRPM

## 2021-05-27 VITALS
SYSTOLIC BLOOD PRESSURE: 100 MMHG | OXYGEN SATURATION: 99 % | TEMPERATURE: 97.8 F | RESPIRATION RATE: 16 BRPM | DIASTOLIC BLOOD PRESSURE: 62 MMHG | HEART RATE: 72 BPM

## 2021-05-27 DIAGNOSIS — N76.0 ACUTE VAGINITIS: ICD-10-CM

## 2021-05-27 ASSESSMENT — PATIENT HEALTH QUESTIONNAIRE - PHQ9: SUM OF ALL RESPONSES TO PHQ QUESTIONS 1-9: 17

## 2021-05-27 NOTE — PATIENT INSTRUCTIONS - HE
Please contact crisis or go to the emergency room if you have thoughts of self harm or suicide.  Take medication as prescribed. Please do not make changes or adjustments to your medications without talking to a health professional first.  Contact the pharmacy if you are out of medication and in need of a refill.  \    I will plan on assuming prescribing for mental health conditions.  No prescriptions made today, due to plan for client to bring in results of genetic testing to help guide pharmacotherapy options.  Continue individual psychotherapy.

## 2021-05-27 NOTE — PATIENT INSTRUCTIONS - HE
Hold all supplements, aspirin and NSAIDs for 7 days prior to surgery.  Follow your surgeon's direction on when to stop eating and drinking prior to surgery.  Your surgeon will be managing your pain after your surgery.    Remove all jewelry and metal piercings before your surgery.   Remove nail polish from fingers before surgery.  Stay clinically well    Inform the surgical team if you should have any changes in your clinical condition    At this time we did check your thyroid today you were asymptomatic but we should have you follow through with your thyroid doctors as you are not taking PTU    Blood counts today, kidney liver test, pregnancy test, thyroid function test    Follow-through with your endocrinologist after your surgery

## 2021-05-27 NOTE — TELEPHONE ENCOUNTER
RN cannot approve Refill    The sig line needs to be updated to reflect current dosing. Medication was last renewed on 1/3/19.    Hallie Hunter, [facility] Triage/Med Refill 4/1/2019     Requested Prescriptions   Pending Prescriptions Disp Refills     FLUoxetine (PROZAC) 20 MG tablet [Pharmacy Med Name: FLUOXETINE 20MG TABLETS] 30 tablet 0     Sig: TAKE 1/2 TABLET BY MOUTH DAILY FOR 2 WEEKS. THEN TAKE 1 TABLET BY MOUTH DAILY.    SSRI Refill Protocol  Passed - 3/31/2019 12:08 PM       Passed - PCP or prescribing provider visit in last year    Last office visit with prescriber/PCP: 1/3/2019 Joshua Dumont MD OR same dept: 1/3/2019 Joshua Dumont MD OR same specialty: 1/3/2019 Joshua Dumont MD  Last physical: Visit date not found Last MTM visit: Visit date not found   Next visit within 3 mo: Visit date not found  Next physical within 3 mo: Visit date not found  Prescriber OR PCP: Joshua Dumont MD  Last diagnosis associated with med order: There are no diagnoses linked to this encounter.  If protocol passes may refill for 12 months if within 3 months of last provider visit (or a total of 15 months).

## 2021-05-27 NOTE — ANESTHESIA CARE TRANSFER NOTE
Last vitals:   Vitals:    04/18/19 1110   BP: 92/52   Pulse: 68   Resp: 14   Temp: 36.4  C (97.6  F)   SpO2: 100%     Pt brought to PACU on 10L facemask. Monitors applied. VSS upon arrival.    Patient's level of consciousness is drowsy  Spontaneous respirations: yes  Maintains airway independently: yes  Dentition unchanged: yes  Oropharynx: oropharynx clear of all foreign objects    QCDR Measures:  ASA# 20 - Surgical Safety Checklist: WHO surgical safety checklist completed prior to induction    PQRS# 430 - Adult PONV Prevention: 4558F - Pt received => 2 anti-emetic agents (different classes) preop & intraop  ASA# 8 - Peds PONV Prevention: NA - Not pediatric patient, not GA or 2 or more risk factors NOT present  PQRS# 424 - Lashanda-op Temp Management: 4559F - At least one body temp DOCUMENTED => 35.5C or 95.9F within required timeframe  PQRS# 426 - PACU Transfer Protocol: - Transfer of care checklist used  ASA# 14 - Acute Post-op Pain: ASA14B - Patient did NOT experience pain >= 7 out of 10

## 2021-05-27 NOTE — PROGRESS NOTES
Correct pharmacy verified with patient and confirmed in snapshot? [x] yes []no    Charge captured ? [x] yes  [] no    Medications Faxed  to Pharmacy [x] yes []no  Name of Pharmacy: [phone]  List Medications, including dose, quantity and instructions  Desvenlafaxine 50 mg-hard copy destroyied witnessed Bella MUELLER CMA    Medication Prescriptions given to patient   [] yes  [x] no   List the name of the drug the prescription was written for.       Medications ordered this visit were e-scribed.  Verified by order class [x] yes  [] no  Lamictal 25 mg times 2 and 100 mg    Medication changes or discontinuations were communicated to patient's pharmacy: [] yes  [x] no    UA collected [] yes  [x] no    [place] Prescription Monitoring Program Reviewed? [x] yes  [] no    Referrals were made to: none     Future appointment was made: [x] yes  [] no    Dictation completed at time of chart check: [x] yes  [] no    I have checked the documentation for today s encounters and the above information has been reviewed and completed.

## 2021-05-27 NOTE — PATIENT INSTRUCTIONS - HE
Please contact crisis or go to the emergency room if you have thoughts of self harm or suicide.  Take medication as prescribed. Please do not make changes or adjustments to your medications without talking to a health professional first.  Contact the pharmacy if you are out of medication and in need of a refill.

## 2021-05-27 NOTE — ANESTHESIA POSTPROCEDURE EVALUATION
Patient: Tara Sorenson  ROBOTIC TOTAL LAPAROSCOPIC HYSTERECTOMY BILATERAL SALPINGECTOMY CYSTOSCOPY ,ANTERIOR REPAIR  Anesthesia type: general    Patient location: PACU  Last vitals:   Vitals Value Taken Time   /57 4/18/2019 12:50 PM   Temp 37.7  C (99.9  F) 4/18/2019 12:30 PM   Pulse 71 4/18/2019 12:56 PM   Resp 11 4/18/2019 12:55 PM   SpO2 99 % 4/18/2019 12:56 PM   Vitals shown include unvalidated device data.  Post vital signs: stable  Level of consciousness: conversant when awake  Post-anesthesia pain: pain controlled; fell asleep after doses of fentanyl given  Post-anesthesia nausea and vomiting: no  Pulmonary: supplemental oxygen  Cardiovascular: stable and blood pressure at baseline  Hydration: adequate  Anesthetic events: no    QCDR Measures:  ASA# 11 - Lashanda-op Cardiac Arrest: ASA11B - Patient did NOT experience unanticipated cardiac arrest  ASA# 12 - Lashanda-op Mortality Rate: ASA12B - Patient did NOT die  ASA# 13 - PACU Re-Intubation Rate: ASA13B - Patient did NOT require a new airway mgmt  ASA# 10 - Composite Anes Safety: ASA10A - No serious adverse event    Additional Notes:

## 2021-05-27 NOTE — ANESTHESIA PROCEDURE NOTES
Peripheral Block    Patient location during procedure: OR  Start time: 4/18/2019 10:56 AM  End time: 4/18/2019 11:00 AM  post-op analgesia per surgeon order as noted in medical record  Staffing:  Performing  Anesthesiologist: Miquel Bess MD  Preanesthetic Checklist  Completed: patient identified, site marked, risks, benefits, and alternatives discussed, timeout performed, consent obtained, airway assessed, oxygen available, suction available, emergency drugs available and hand hygiene performed  Peripheral Block  Block type: other, TAP  Prep: ChloraPrep  Patient position: supine  Patient monitoring: cardiac monitor, continuous pulse oximetry, blood pressure and heart rate  Laterality: bilateral, same technique used bilaterally  Injection technique: ultrasound guided    Ultrasound used to visualize needle placement in proximity to nerve being blocked: yes   Permanent ultrasound image captured for medical record  Sterile gel and probe cover used for ultrasound.    Needle  Needle type: echogenic   Needle gauge: 20G  Needle length: 4 in  no peripheral nerve catheter placed  Assessment  Injection assessment: no difficulty with injection, negative aspiration for heme, no paresthesia on injection and incremental injection

## 2021-05-27 NOTE — ANESTHESIA PREPROCEDURE EVALUATION
Anesthesia Evaluation      Patient summary reviewed   No history of anesthetic complications     Airway   Mallampati: I  Neck ROM: full   Pulmonary - normal exam   (+) a smoker (former; current marijuana use)                         Cardiovascular - negative ROS and normal exam  Exercise tolerance: > or = 4 METS  (-) murmur  Rhythm: regular  Rate: normal,    no murmur      Neuro/Psych    (+) neuromuscular disease (cervical disc herniation),  anxiety/panic attacks,     Comments: Migraines  Bipolar 1 disorder  Hx of methamphetamine abuse in remission    Endo/Other    (+) hyperthyroidism,      GI/Hepatic/Renal - negative ROS      Other findings: Labs 4/18/19:  Hgb 12.4  Na 140, K 4.3, BUn 13, Cr 0.66  4/8/19: Plt 272      Dental - normal exam                        Anesthesia Plan  Planned anesthetic: general endotracheal  GETA.  High risk for PONV and plan for scopolamine patch, dexamethasone, zofran and low-dose propofol gtt (25-50 mcg/kg/min).  Consider ketorolac 30 mg at EOC pending surgeon approval.    ASA 2   Induction: intravenous   Anesthetic plan and risks discussed with: patient  Anesthesia plan special considerations: antiemetics,   Post-op plan: routine recovery

## 2021-05-27 NOTE — PROGRESS NOTES
Correct pharmacy verified with patient and confirmed in snapshot? [x] yes []no    Charge captured ? [x] yes  [] no    Medications Phoned  to Pharmacy [] yes [x]no  Name of Pharmacist:  List Medications, including dose, quantity and instructions      Medication Prescriptions given to patient   [] yes  [x] no   List the name of the drug the prescription was written for.       Medications ordered this visit were e-scribed.  Verified by order class [] yes  [x] no    Medication changes or discontinuations were communicated to patient's pharmacy: [] yes  [x] no    UA collected [] yes  [x] no    [place] Prescription Monitoring Program Reviewed? [x] yes  [] no    Referrals were made to: none     Future appointment was made: [x] yes  [] no    Dictation completed at time of chart check: [x] yes  [] no    I have checked the documentation for today s encounters and the above information has been reviewed and completed.

## 2021-05-28 ASSESSMENT — ANXIETY QUESTIONNAIRES
GAD7 TOTAL SCORE: 18
GAD7 TOTAL SCORE: 21
GAD7 TOTAL SCORE: 20
GAD7 TOTAL SCORE: 21
GAD7 TOTAL SCORE: 20

## 2021-05-28 NOTE — TELEPHONE ENCOUNTER
Triage call:   Patient is calling with increased confusion and a strong aura migraine for 2 days since increasing her dose of Topamax. Instructed to double dose on Tuesday night. Tingling in hands and feet is strong and patient is feeling more confused, noted confusion during call- patient reports that she was driving her kids to school and forgot they were in her car- also had confusion when writer asked for her name at the beginning of the call. Patient indicates that she is worried about how confused she is, stuttering in her speech on the phone- very forgetful. Patient is also worried about the intensity of the Aura she is experiencing as well.      Patient would like provider advice- should she cut back to the previous dose of topamax? Would an ER visit be appropriate for her today with the increased confusion and forgetfulness? Please advise.     *Ok to leave a detailed message upon call back [phone] (home)      Ayanna Martines RN BSBA [facility] Triage/Med Refill 5/16/2019 12:49 PM

## 2021-05-28 NOTE — TELEPHONE ENCOUNTER
Informed pt.  Pt states that she can't avoid driving as pt has to drop out kids at school in the morning.  Significant other picks the kids up in the afternoon.  Pt is asking if it's dangerous to just stop taking it?  Pt can understand if pt was to take only 1 pill tonight, but hot holding it for 1 day.    Pt is taking Topamax 50 mg.  Pt had the confusing and tingling, off feeling, getting auras-bothering pt, migraines are not as severe. Auras are happening a lot more.     Psychiatrist (Dr. Lennie Brown at [facility]) started pt on Lamotrigine (unknown of dose) but has not started on it for bipolar-only picked it up.       Pt states that pt was taking PTU 50 mg tablet two times a day  and at that time, pt was breast feeding.  Was told that it was not as effect.  Since now pt is breastfeeding, can pt start on Methimazole?  Pt was seeing her endocrinologist for this. Pt has not seen them yet-had an appt but couldn't make it due to a hysterectomy.  Pt can get back to them again.  Can pt go back on Methimazole?  If so, pt is asking for an Rx until pt can try to make an appt with them.     Please advise.
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
## 2021-05-28 NOTE — TELEPHONE ENCOUNTER
zofran 4 mg 1-2 tabs every 8 hours for 2 days ok per Dr Skye Huynh, RN [facility] RN Triage

## 2021-05-28 NOTE — TELEPHONE ENCOUNTER
Describe your symptoms: Patient states as soon as she increased the Topamax to 50 mg yesterday she started getting blurred vision and it is triggering the migraines.  Denies any mouth swelling , difficulty breathing or shortness of breath.  Please advise.   Any pain: yes  New/Ongoing: New  How long have you been having symptoms: 1  day(s)  Have you been seen for this:  No.  Appointment offered? wants RN advise  Triage offered?: yes  Home remedies tried: NA  Pharmacy Name and Location: [facility] [address] and [place].   Okay to leave a detailed message? Yes

## 2021-05-28 NOTE — TELEPHONE ENCOUNTER
New Appointment Needed  What is the reason for the visit:    Same Date/Next Day Appt Request  What is the reason for your visit?: Patient is having medication issues, patient has surgery last week    Provider Preference: PCP only  How soon do you need to be seen?: Asap  Waitlist offered?: No  Okay to leave a detailed message:  Yes

## 2021-05-28 NOTE — TELEPHONE ENCOUNTER
"Pt reports she had a partial hysterectomy on 4/18/19 at [facility]. Pt reports she just now tried to have a bowel movement and there was \"a lot of blood in the toilet\" and \"a lot of blood pouring out vaginally\" \"turned toilet water completely red\". Felt had to poop and \"more blood shot out\". Feels like vaginal burning. Feeling \"fine\" overall. Denies dizziness, lightheadedness or fever. Large clot passed while speaking with rider on the phone. Pt reports she continues to bleed \"dripping out\".    Advised pt to have her partner bring her to the ER now. Bring towel for protection.    Pt verbalizes understanding and agrees to plan.     Reason for Disposition    SEVERE vaginal bleeding (i.e., soaking 2 pads or tampons per hour and present 2 or more hours; 1 menstrual cup every 2 hours)    Protocols used: VAGINAL BLEEDING - [id number]      "

## 2021-05-28 NOTE — TELEPHONE ENCOUNTER
See separate triage encounter for triage information.     Ayanna Martines RN BSBA [facility] Triage/Med Refill 5/16/2019 5:05 PM

## 2021-05-28 NOTE — TELEPHONE ENCOUNTER
Spoke with pt.  Pt states that she was given Diluadid and Toradol (Hospital on Saturday).  D/C with Vicodin.  Vicodin didn't help.  Pt took imitrex and that didn't help.  Pt went back the hospital.  Since 4/18/19, pt has been going into the hospital everyday.  Was discharged today and told to follow up with PCP in 1-2 days.  No openings tomorrow and PCP is off on Wednesday.  I used a same day for Thursday, April 25th at 8:40 am.  Pt is going to see if she can make that work as pt drops off her children to school at 9:00 am.  I offered appt with other MDs but pt declined.  Asked to be placed on wait list.
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
## 2021-05-28 NOTE — TELEPHONE ENCOUNTER
Pt had hysterectomy   Taking pain med oxycodone 5 mg and is making her sick with nausea and a migraine 10/10 pain  Making her chest hurt  Getting dilaudid at the hospital 18th to 19th  D/c yesterday and they didn't give her any oxycodone before she left  Breastfeeding her baby and now she knows with pain meds that she cannot right now  Please advise. Getting migraine with pain meds  She has taken percocet in the past for migraine but didn't help the migraines  Pt has sumatriptan for her migraines and has not taken it as she had bad side effects the last time she took it. She got nausea and vomiting from sumatriptan     She had toradol and dilaudid at the hospital  Severe migraine and doesn't want to take the oxycodone 5 mg as it makes her sick as does the sumatriptan    Stephanie Huynh, RN [facility] RN Triage        Reason for Disposition    Caller has NON-URGENT medication question about med that PCP prescribed and triager unable to answer question    Protocols used: MEDICATION QUESTION CALL-A-

## 2021-05-28 NOTE — TELEPHONE ENCOUNTER
Dr Cheung on call offered zofran .Allergic to zofran and cannot take compazine per pt  Pt tried sumatriptan to see if it helps and if not she will go to ER  Stephanie Huynh, RN [facility] RN Triage

## 2021-05-28 NOTE — TELEPHONE ENCOUNTER
Hold Topamax for one day  Then Take 1/2 of current dose an see if symptoms resolve.    No driving    Urgent care if worse or not resolving     Ivan

## 2021-05-29 NOTE — TELEPHONE ENCOUNTER
Please notify patient that I sent a prescription for permethrin, with a bottle big enough to hopefully treat her and children.  It is the only alternative that is safe in young children.

## 2021-05-29 NOTE — PROGRESS NOTES
Assessment/Plan:   Infective otitis externa, left  TMJ (temporomandibular joint syndrome)  Left ear pain for 4 days.  I suspect this is a combination of a localized otitis externa in the distal anterior wall behind the tragus and TMJ arthralgia left greater than right.  Treatment is targeted at both.  I discussed red flag symptoms, return precautions to clinic/ER and follow up care with patient/parent.  Expected clinical course, symptomatic cares advised. Questions answered. Patient/parent amenable with plan.  - neomycin-polymyxin-hydrocortisone (CORTISPORIN) otic solution; Administer 5 drops into the left ear 3 (three) times a day for 10 days.  Dispense: 10 mL; Refill: 0    Soft diet  Ice packs  Cortisporin drops to the left ear as directed  No gum, no excess chewing or wide jaw opening  Recheck as needed  Ibuprofen 800mg twice a day with food for antiinflammatory effect.     Subjective:      Tara Sorenson is a 29 y.o. female who presents with left ear pain.  On Tuesday, 4 days ago, her left ear started popping and felt full. It has also been tender to touch all the time. The next day she had HA as well, mainly left side. No ST, no URI or cough or sinus pressure or congestion. No fever. She has not noticed any ear drainage. She has had no swimming, no trauma, no plane trips. She has started a new medication, lamictal, and increased the dose this week. She has history of bipolar disorder and has taken many antipsychotic medications in the past. She does have some jaw movements that persist though is not aware of much grinding of her teeth. She does clench her teeth at times. No prior TMJ problems. No teeth issues currently. No rash. No vertigo or loss of hearing. Former smoker.     Allergies   Allergen Reactions     Haloperidol Anxiety     Felt anxious at 20hrs post single 2mg IV dose of haloperidol lactate      Compazine [Prochlorperazine] Other (See Comments)     Anxiety      Metoclopramide Hcl Other  "(See Comments) and Unknown     \"It makes me feel like jumping out of my skin.\"  \"wanted to jump out of my skin\"     Current Outpatient Medications on File Prior to Visit   Medication Sig Dispense Refill     acetaminophen (TYLENOL) 500 MG tablet Take 1,000 mg by mouth every 6 (six) hours as needed for pain.       lamoTRIgine (LAMICTAL) 100 MG tablet Take 1 tablet (100 mg total) by mouth daily. 30 tablet 5     magnesium oxide (MAG-OX) 400 mg (241.3 mg magnesium) tablet Take 1 tablet by mouth daily.  3     methIMAzole (TAPAZOLE) 5 MG tablet Take 1 tablet (5 mg total) by mouth 2 (two) times a day. 60 tablet 3     riboflavin, vitamin B2, 100 mg Tab 2 tablets daily for migraine prophylaxis 60 tablet 12     rizatriptan (MAXALT-MLT) 10 MG disintegrating tablet Take 1 tablet (10 mg total) by mouth as needed for migraine. May repeat in 2 hours if needed 9 tablet 1     topiramate (TOPAMAX) 25 MG tablet Take 25 mg by mouth 2 (two) times a day.  6     ibuprofen (ADVIL,MOTRIN) 200 MG tablet Take 600 mg by mouth every 8 (eight) hours as needed for pain.       lamoTRIgine (LAMICTAL) 25 MG tablet Take 1 tab by mouth daily 14 days, then increaese to 2 tabs PO daily for 14 days 42 tablet 0     malathion (OVIDE) 0.5 % lotion Apply to dry hair and rinse as directed and repeat in 1 week 60 mL 3     permethrin (NIX) 1 % liquid Apply to scalp according to package instructions.  May repeat in 9-10 days. 240 Bottle 0     polyvinyl alcohol (ARTIFICIAL TEARS, POLYVIN ALC,) 1.4 % ophthalmic solution 1 drop to affected eye every 4 hours while awake as needed. 15 mL 3     UNABLE TO FIND Med Name: Medical canabis       No current facility-administered medications on file prior to visit.      Patient Active Problem List   Diagnosis     Methamphetamine Abuse - In Remission     Pain During Urination (Dysuria)     Worsening Vision Started Suddenly     Classic Migraine (With Aura) With Intractable Migraine     Carrier of group B Streptococcus     " Normal delivery     Abnormal imaging of thyroid     Paresthesia     Generalized anxiety disorder     Painful lactation     Cervical disc disorder     Hyperthyroidism     ASCUS with positive high risk HPV cervical     Bipolar affective disorder (H)     Eating disorder     History of methamphetamine abuse     Marijuana use     Migraine without aura and with status migrainosus, not intractable     Poisoning by psychotropic drug     Severe somatoform disorder     Other irritable bowel syndrome     Uterovaginal prolapse     Graves disease       Objective:     BP 95/54 (Patient Site: Right Arm, Patient Position: Sitting, Cuff Size: Adult Small)   Pulse 73   Temp 98.2  F (36.8  C) (Oral)   Resp 16   Wt 112 lb 5 oz (50.9 kg)   LMP 04/05/2019   SpO2 98%   BMI 19.90 kg/m      Physical  General Appearance: Alert, cooperative, no distress, AVSS  Head: Normocephalic, without obvious abnormality, atraumatic  Eyes: Conjunctivae are normal.   Ears: the right TM and ear canal is normal. The left ear has mild pain with retraction of the pinna, more so with pressure on the tragus. The left TMJ joint has pain with palpation during jaw opening that reproduces some of her discomfort. There is also an area not well visualized inside the tragus/anterior wall that is tender when the ear speculum touches it. Remaining canal has minimal redness, no lesions or swelling. No obstructing wax. TM is normal.   Nose: No significant congestion, no sinus pain.  Throat: Throat is normal.  No exudate.  No significant lesions. Dentition okay, some pain with wide jaw opening bilateral TMJ.   Neck: No adenopathy  Lungs: Clear to auscultation bilaterally, respirations unlabored  Heart: Regular rate and rhythm  Skin:  no rashes or lesions  Psychiatric: Patient has a normal mood and affect.

## 2021-05-29 NOTE — TELEPHONE ENCOUNTER
Medication Question or Clarification  Who is calling: [facility]acy: [facility]  What medication are you calling about?   malathion (OVIDE) 0.5 % lotion 60 mL 3 6/11/2019     Sig: Apply to dry hair and rinse as directed and repeat in 1 week    Sent to pharmacy as: malathion (OVIDE) 0.5 % lotion    E-Prescribing Status: Receipt confirmed by pharmacy (6/11/2019  5:54 PM CDT)        Who prescribed the medication?: Joshua Dumont MD    What is your question/concern?: Fax received from pharmacy stating that the above medication has been discontinued and is no longer available    Please send alternative  Pharmacy: [facility]  Okay to leave a detailed message?: No  Site CMT - Please call the pharmacy to obtain any additional needed information.

## 2021-05-30 VITALS — BODY MASS INDEX: 16.96 KG/M2 | WEIGHT: 95.75 LBS

## 2021-05-30 NOTE — PROGRESS NOTES
Correct pharmacy verified with patient and confirmed in snapshot? [x] yes []no    Charge captured ? [x] yes  [] no    Medications Phoned  to Pharmacy [] yes [x]no  Name of Pharmacist:  List Medications, including dose, quantity and instructions      Medication Prescriptions given to patient   [] yes  [x] no   List the name of the drug the prescription was written for.       Medications ordered this visit were e-scribed.  Verified by order class [x] yes  [] no  Pristiq  Medication changes or discontinuations were communicated to patient's pharmacy: [] yes  [x] no    UA collected [] yes  [x] no    [place] Prescription Monitoring Program Reviewed? [x] yes  [] no    Referrals were made to:  none    Future appointment was made: [x] yes  [] no  9/9/19  Dictation completed at time of chart check: [x] yes  [] no    I have checked the documentation for today s encounters and the above information has been reviewed and completed.

## 2021-05-30 NOTE — TELEPHONE ENCOUNTER
PA approved for Desvenlafaxine ER untl 7-30-20.  Pharmacy notified and will notify patient when ready for .

## 2021-05-30 NOTE — PATIENT INSTRUCTIONS - HE
Please contact crisis or go to the emergency room if you have thoughts of self harm or suicide.  Take medication as prescribed. Please do not make changes or adjustments to your medications without talking to a health professional first.  Contact the pharmacy if you are out of medication and in need of a refill.  F/U 6 weeks.

## 2021-05-30 NOTE — TELEPHONE ENCOUNTER
Incoming [employer] from [employer] at [facility] fax, labeled and placed in providers mailbox.

## 2021-05-31 ENCOUNTER — RECORDS - HEALTHEAST (OUTPATIENT)
Dept: ADMINISTRATIVE | Facility: CLINIC | Age: 31
End: 2021-05-31

## 2021-05-31 VITALS — WEIGHT: 105 LBS | BODY MASS INDEX: 18.6 KG/M2

## 2021-05-31 VITALS — BODY MASS INDEX: 18.71 KG/M2 | WEIGHT: 105.6 LBS

## 2021-05-31 VITALS — BODY MASS INDEX: 18.87 KG/M2 | HEIGHT: 63 IN | WEIGHT: 106.5 LBS

## 2021-05-31 VITALS — WEIGHT: 110 LBS | BODY MASS INDEX: 19.49 KG/M2

## 2021-05-31 VITALS — BODY MASS INDEX: 18.42 KG/M2 | WEIGHT: 104 LBS

## 2021-05-31 VITALS — HEIGHT: 63 IN | BODY MASS INDEX: 18.61 KG/M2 | WEIGHT: 105 LBS

## 2021-05-31 VITALS — WEIGHT: 104.7 LBS | BODY MASS INDEX: 18.55 KG/M2

## 2021-05-31 VITALS — WEIGHT: 106.25 LBS | BODY MASS INDEX: 18.82 KG/M2

## 2021-05-31 NOTE — PATIENT INSTRUCTIONS - HE
Right now it doesn't look like herpes or viral or an allergic reaction. While it could be that brenton we talked about, we normally see that coming in clusters.     Let's try the bactroban as needed to see if it goes away and stays away.  If this doesn't help, call me or send a [employer] message and we can connect you with dermatology.    Thank you for coming in today!    If you receive a survey from [employer] about your experience today, it would be very helpful if you could fill it out to let us know what went well and what we can improve!    General Information:    Today you had your appointment with Serafin Eller NP    My hours are:    Monday : Out of clinic  Tuesday : 8:00AM - 5:00 PM  Wednesday: 8:00AM - 5:00 PM  Thursday: 8:00AM - 5:00 PM  Friday: 8:00AM - 5:00 PM    I am not in the office Mondays. Therefore non-urgent calls and medical messages received on Monday will be addressed when I am back in the office on Tuesday. Urgent matters will be reviewed and addressed by one of my partners in the office as needed.    If lab work was done today as part of your evaluation you will generally be contacted via [employer], mail, or phone with the results within 1-5 days. If there is an alarming result we will contact you by phone. Lab results come back at varying times, I generally wait until all lab results are available before making comments on the results.     If you need refills please contact your pharmacy. They will send a refill request to me to review. Please allow 3-5 business days for us to process all refill requests.     My Clinical Assistant is Minnie. Please call us at [phone] or send a medical message with any questions or concerns.

## 2021-05-31 NOTE — PROGRESS NOTES
Chief Complaint   Patient presents with     Skin Problem     States that she has little red bumps around her lips and chin. Come and go. Not sure what it is. Not itchy or spreading.        HPI: Patient presents today with complaints of small little bumps that sometimes appear around her lips and chin.  They always come and go.  They always self resolved.  They are not painful unless she picks her scratches at them in which they will burn a little bit.  Sometimes she will see a tiny eugene at the tip of them.  No oral lesions inside the mouth.  No travel to wooded areas.  Denies new soap, detergent, and lotion use.  Afebrile without chills.    ROS:Review of Systems - negative except for what's listed in the HPI    SH: The Patient's  reports that she quit smoking about 12 months ago. Her smoking use included cigarettes. She smoked 0.25 packs per day. She has never used smokeless tobacco. She reports that she has current or past drug history. Drug: Marijuana. Frequency: 7.00 times per week. She reports that she does not drink alcohol.      FH: The Patient's family history includes Migraines in her maternal grandmother, mother, and sister; Spina bifida in her maternal aunt and niece.     Meds:    Current Outpatient Medications on File Prior to Visit   Medication Sig Dispense Refill     acetaminophen (TYLENOL) 500 MG tablet Take 1,000 mg by mouth every 6 (six) hours as needed for pain.       ibuprofen (ADVIL,MOTRIN) 200 MG tablet Take 600 mg by mouth every 8 (eight) hours as needed for pain.       lamoTRIgine (LAMICTAL) 100 MG tablet Take 1 tablet (100 mg total) by mouth daily. 30 tablet 5     magnesium oxide (MAG-OX) 400 mg (241.3 mg magnesium) tablet Take 1 tablet by mouth daily.  3     methIMAzole (TAPAZOLE) 5 MG tablet Take 1 tablet (5 mg total) by mouth 2 (two) times a day. 60 tablet 3     riboflavin, vitamin B2, 100 mg Tab 2 tablets daily for migraine prophylaxis 60 tablet 12     rizatriptan (MAXALT-MLT) 10 MG  "disintegrating tablet Take 1 tablet (10 mg total) by mouth as needed for migraine. May repeat in 2 hours if needed 9 tablet 1     topiramate (TOPAMAX) 25 MG tablet Take 25 mg by mouth 2 (two) times a day.  6     UNABLE TO FIND Med Name: Medical abel       desvenlafaxine succinate (PRISTIQ) 50 MG 24 hr tablet Take 1 tablet (50 mg total) by mouth daily. 30 tablet 5     permethrin (NIX) 1 % liquid Apply to scalp according to package instructions.  May repeat in 9-10 days. 240 Bottle 0     polyvinyl alcohol (ARTIFICIAL TEARS, POLYVIN ALC,) 1.4 % ophthalmic solution 1 drop to affected eye every 4 hours while awake as needed. 15 mL 3     No current facility-administered medications on file prior to visit.        O:  BP 94/52   Pulse 72   Temp 98.3  F (36.8  C) (Oral)   Ht 5' 3\" (1.6 m)   Wt 108 lb (49 kg)   LMP 04/05/2019   SpO2 96%   BMI 19.13 kg/m      Physical Examination:   General appearance - alert, well appearing, and in no distress  Mental status - alert, oriented to person, place, and time  Mouth - mucous membranes moist, pharynx normal without lesions  Skin -on the chin there are 2 very small red bumps measuring 1 mm in diameter.  Slightly raised.  Mildly erythematous.  Not hot to the touch.  Does not appear to be clustering.  Along the upper left lip there is another lesion measuring approximately 2 mm in diameter with a small pustule and had it.  No clustering or blisters.      A/P:     Problem List Items Addressed This Visit     None      Visit Diagnoses     Folliculitis    -  Primary    Relevant Medications    mupirocin (BACTROBAN) 2 % ointment            1. Folliculitis  Symptoms do not seem consistent with HSV 1 given its appearance and how she describes them coming and going sometimes with an hours.  Also does not appear to be a contact dermatitis.  Possible mild folliculitis.  Can give Bactroban a try and if symptoms continue or fail to improve, she can let me know and I can refer to " dermatology.    - mupirocin (BACTROBAN) 2 % ointment; Apply topically daily as needed.  Dispense: 15 g; Refill: 0        Serafin Eller, CNP

## 2021-06-01 ENCOUNTER — RECORDS - HEALTHEAST (OUTPATIENT)
Dept: ADMINISTRATIVE | Facility: CLINIC | Age: 31
End: 2021-06-01

## 2021-06-01 VITALS — BODY MASS INDEX: 20.06 KG/M2 | WEIGHT: 113.25 LBS

## 2021-06-01 VITALS — WEIGHT: 116 LBS | BODY MASS INDEX: 20.55 KG/M2 | HEIGHT: 63 IN

## 2021-06-01 VITALS — BODY MASS INDEX: 20.05 KG/M2 | WEIGHT: 113.2 LBS
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
## 2021-06-01 NOTE — TELEPHONE ENCOUNTER
RN cannot approve Refill Request    RN can NOT refill this medication med is not covered by policy/route to provider. Last office visit: 4/25/2019 Joshua Dumont MD Last Physical: 4/8/2019 Last MTM visit: Visit date not found Last visit same specialty: 8/29/2019 Serafin Eller CNP.  Next visit within 3 mo: Visit date not found  Next physical within 3 mo: Visit date not found      Mary Cooper, [facility] Triage/Med Refill 9/7/2019    Requested Prescriptions   Pending Prescriptions Disp Refills     methIMAzole (TAPAZOLE) 5 MG tablet [Pharmacy Med Name: METHIMAZOLE 5MG TABLETS] 60 tablet 0     Sig: TAKE 1 TABLET(5 MG) BY MOUTH TWICE DAILY       There is no refill protocol information for this order

## 2021-06-01 NOTE — TELEPHONE ENCOUNTER
Prior Authorization Request  Who s requesting:  Pharmacy  Pharmacy Name and Location: [facility] #[id number]  Medication Name: Elidel 1% cream  Insurance Plan: [employer]   Insurance Member ID Number:  [id number]  Informed patient that prior authorizations can take up to 10 business days for response:   No  Okay to leave a detailed message: No

## 2021-06-01 NOTE — TELEPHONE ENCOUNTER
PA approved for Trintellix 5 mg until 10-11-19 and the 10 mg until 9-11-20.  Spoke to Yahaira the pharmacist and both ran through without a co-pay.  She said they would notify patient when ready for . Patient is on an increase taper.
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
## 2021-06-01 NOTE — TELEPHONE ENCOUNTER
REQUESTING PRESCRIBED TREATMENT PLAN ON RECENTLY COMPLETED MEDICAL OPINION FORM - PUT IN PROVIDER'S CLINIC MAILBOX

## 2021-06-01 NOTE — TELEPHONE ENCOUNTER
RN cannot approve Refill Request    RN can NOT refill this medication med is not covered by policy/route to provider and medication not on med list. Last office visit: 4/25/2019 Joshua Dumont MD Last Physical: 4/8/2019 Last MTM visit: Visit date not found Last visit same specialty: 8/29/2019 Serafin Eller CNP.  Next visit within 3 mo: Visit date not found  Next physical within 3 mo: Visit date not found      Ayanna Martines, [facility] Triage/Med Refill 9/6/2019    Requested Prescriptions   Pending Prescriptions Disp Refills     metroNIDAZOLE (FLAGYL) 500 MG tablet [Pharmacy Med Name: METRONIDAZOLE 500MG TABLETS] 14 tablet 0     Sig: TAKE 1 TABLET(500 MG) BY MOUTH TWICE DAILY FOR 7 DAYS       There is no refill protocol information for this order

## 2021-06-01 NOTE — TELEPHONE ENCOUNTER
Refill Approved    Rx renewed per Medication Renewal Policy. Medication was last renewed on 5/16/19.    Ayanna YASMIN Bobbi, [facility] Triage/Med Refill 9/21/2019     Requested Prescriptions   Pending Prescriptions Disp Refills     rizatriptan (MAXALT-MLT) 10 MG disintegrating tablet [Pharmacy Med Name: RIZATRIPTAN ODT 10MG TABLETS] 9 tablet 0     Sig: TAKE 1 TABLET BY MOUTH AS NEEDED FOR MIGRAINE. MAY REPEAT IN 2 HOURS IF NEEDED       Triptans Refill Protocol Passed - 9/21/2019 10:41 AM        Passed - PCP or prescribing provider visit in past 12 months       Last office visit with prescriber/PCP: 9/17/2019 Joshua Dumont MD OR same dept: 9/17/2019 Joshua Dumont MD OR same specialty: 9/17/2019 Joshua Dumont MD  Last physical: 4/8/2019 Last MTM visit: Visit date not found   Next visit within 3 mo: Visit date not found  Next physical within 3 mo: Visit date not found  Prescriber OR PCP: Joshua Dumont MD  Last diagnosis associated with med order: 1. Migraine without aura and with status migrainosus, not intractable  - rizatriptan (MAXALT-MLT) 10 MG disintegrating tablet [Pharmacy Med Name: RIZATRIPTAN ODT 10MG TABLETS]; TAKE 1 TABLET BY MOUTH AS NEEDED FOR MIGRAINE. MAY REPEAT IN 2 HOURS IF NEEDED  Dispense: 9 tablet; Refill: 0    If protocol passes may refill for 12 months if within 3 months of last provider visit (or a total of 15 months).

## 2021-06-01 NOTE — TELEPHONE ENCOUNTER
Central PA team  [phone]  Pool: HE PA MED ([id number])          PA has been initiated.       PA form completed and faxed insurance via Cover My Meds     Key:  [id number]     Medication:  Elidel 1% cream    Insurance:  [employer] [place]        Response will be received via fax and may take up to 5-10 business days depending on plan

## 2021-06-01 NOTE — PATIENT INSTRUCTIONS - HE
Lookup perioral dermatitis  Treat with Elidel apply twice daily to the affected area until resolved and use sparingly    For headaches consider the class of medication  the calcitonin gene-related peptide (CGRP) antagonists

## 2021-06-02 ENCOUNTER — RECORDS - HEALTHEAST (OUTPATIENT)
Dept: ADMINISTRATIVE | Facility: CLINIC | Age: 31
End: 2021-06-02

## 2021-06-02 VITALS — BODY MASS INDEX: 19.84 KG/M2 | WEIGHT: 112 LBS | HEIGHT: 63 IN

## 2021-06-02 VITALS — BODY MASS INDEX: 20.02 KG/M2 | WEIGHT: 113 LBS

## 2021-06-02 VITALS — WEIGHT: 119 LBS | BODY MASS INDEX: 21.09 KG/M2 | HEIGHT: 63 IN

## 2021-06-02 VITALS — WEIGHT: 112.7 LBS | HEIGHT: 63 IN | BODY MASS INDEX: 19.97 KG/M2

## 2021-06-02 VITALS — WEIGHT: 120 LBS | BODY MASS INDEX: 21.26 KG/M2

## 2021-06-02 VITALS — WEIGHT: 118.9 LBS | BODY MASS INDEX: 21.06 KG/M2

## 2021-06-02 VITALS — WEIGHT: 114 LBS | HEIGHT: 63 IN | BODY MASS INDEX: 20.2 KG/M2

## 2021-06-02 VITALS — WEIGHT: 115 LBS | BODY MASS INDEX: 20.37 KG/M2

## 2021-06-02 NOTE — PROGRESS NOTES
Patient here today for follow up of medication management. States depression 5/5, anxiety 5/5. States sleeps about 6-8 hours a night.  Currently taking prestiq prescribed by primary provider, does not feel it is helping.  PHQ:20  AURELIO:21

## 2021-06-02 NOTE — TELEPHONE ENCOUNTER
RN cannot approve Refill Request    RN can NOT refill this medication med is not covered by policy/route to provider     . Last office visit: 9/17/2019 Joshua Dumont MD Last Physical: 4/8/2019 Last MTM visit: Visit date not found Last visit same specialty: 9/17/2019 Joshua Dumont MD.  Next visit within 3 mo: Visit date not found  Next physical within 3 mo: Visit date not found      Leydi Diaz, [facility] Triage/Med Refill 10/8/2019    Requested Prescriptions   Pending Prescriptions Disp Refills     methIMAzole (TAPAZOLE) 5 MG tablet [Pharmacy Med Name: METHIMAZOLE 5MG TABLETS] 60 tablet 0     Sig: TAKE 1 TABLET(5 MG) BY MOUTH TWICE DAILY       There is no refill protocol information for this order

## 2021-06-02 NOTE — PROGRESS NOTES
Correct pharmacy verified with patient and confirmed in snapshot? [x] yes []no    Charge captured ? [x] yes  [] no    Medications Phoned  to Pharmacy [] yes [x]no  Name of Pharmacist:  List Medications, including dose, quantity and instructions      Medication Prescriptions given to patient   [] yes  [x] no   List the name of the drug the prescription was written for.       Medications ordered this visit were e-scribed.  Verified by order class [x] yes  [] no  desvenlafaxine succinate (PRISTIQ) 100 MG 24 hr tablet 30 tablet 5 10/28/2019     lamoTRIgine (LAMICTAL) 200 MG tablet 30 tablet 5 10/28/2019           Medication changes or discontinuations were communicated to patient's pharmacy: [] yes  [x] no    UA collected [] yes  [x] no    [place] Prescription Monitoring Program Reviewed? [] yes  [x] no    Referrals were made to:  no    Future appointment was made: [x] yes  [] no    Dictation completed at time of chart check: [x] yes  [] no    I have checked the documentation for today s encounters and the above information has been reviewed and completed.

## 2021-06-02 NOTE — PATIENT INSTRUCTIONS - HE
Please contact crisis or go to the emergency room if you have thoughts of self harm or suicide.  Take medication as prescribed. Please do not make changes or adjustments to your medications without talking to a health professional first.  Contact the pharmacy if you are out of medication and in need of a refill.  Consider stopping smoking, it is the single most important thing you can do to improve your health.    1. When you get done taking pristiq 50mg tabs for 4 weeks, if there is no improvement with mood and anxeity, please start taking 100mg tablets.  2. Please increase lamotrigine to 200mg per day.  As we have talked about, there is a small risk for a serious side effect called Rae Johnsons syndrome. If you havea rash, please see your primary care doctor within 24H or go to the emergency room.      Please see me in 6 weeks, this is the length of time it take for many people to respond to medications.

## 2021-06-02 NOTE — TELEPHONE ENCOUNTER
I need the form to check the box or verbally relay to the Phyllis Coles that I do not see her returning to work in the foreseeable future .  Ivan

## 2021-06-03 VITALS — WEIGHT: 111 LBS | BODY MASS INDEX: 19.67 KG/M2 | HEIGHT: 63 IN

## 2021-06-03 VITALS
HEART RATE: 86 BPM | WEIGHT: 103 LBS | HEIGHT: 63 IN | DIASTOLIC BLOOD PRESSURE: 71 MMHG | BODY MASS INDEX: 18.25 KG/M2 | SYSTOLIC BLOOD PRESSURE: 111 MMHG

## 2021-06-03 VITALS
SYSTOLIC BLOOD PRESSURE: 96 MMHG | HEART RATE: 84 BPM | DIASTOLIC BLOOD PRESSURE: 55 MMHG | WEIGHT: 109 LBS | HEIGHT: 63 IN | BODY MASS INDEX: 19.31 KG/M2

## 2021-06-03 VITALS — BODY MASS INDEX: 20.61 KG/M2 | WEIGHT: 112 LBS | HEIGHT: 62 IN

## 2021-06-03 VITALS — WEIGHT: 111 LBS | HEIGHT: 63 IN | BODY MASS INDEX: 19.67 KG/M2

## 2021-06-03 VITALS
HEIGHT: 63 IN | BODY MASS INDEX: 18.68 KG/M2 | WEIGHT: 105.4 LBS | HEART RATE: 77 BPM | DIASTOLIC BLOOD PRESSURE: 62 MMHG | SYSTOLIC BLOOD PRESSURE: 110 MMHG | OXYGEN SATURATION: 97 %

## 2021-06-03 VITALS
DIASTOLIC BLOOD PRESSURE: 60 MMHG | BODY MASS INDEX: 19.13 KG/M2 | WEIGHT: 108 LBS | HEART RATE: 84 BPM | SYSTOLIC BLOOD PRESSURE: 120 MMHG

## 2021-06-03 VITALS — HEIGHT: 63 IN | BODY MASS INDEX: 19.14 KG/M2 | WEIGHT: 108 LBS

## 2021-06-03 VITALS — BODY MASS INDEX: 19.9 KG/M2 | WEIGHT: 112.31 LBS

## 2021-06-03 VITALS — WEIGHT: 112 LBS | BODY MASS INDEX: 19.84 KG/M2

## 2021-06-03 NOTE — TELEPHONE ENCOUNTER
Patient presented in clinic asking to discontinue her Pristiq. She said she has trouble sleeping, sexual side effects and its interfering with memory tracking. She said she is not going to take her dose today.  Her next follow up appointment is 12-9-19 with JAGDISH Brown.  She would like to stop the Pristiq and discuss other options at her next visit.

## 2021-06-03 NOTE — PROGRESS NOTES
Headaches same  Medication changes per orders.         Headaches    Daily  Aura   Sudden   Within 20 min  Headaches  ++ Photo/ Phono/ N/Vomiting  1-3  Days  Level 10 when in full force    Better Toradol Dilaudid>>>  Helps                 Imitrex  Pill  helpded a little first  Rebound in same day    Worse Triggers >>> Smells/ foods / lights /  Reflection of light   So many     Prophylaxis:  Magnesium/ Propranolol  Low BP  / Nortriptyline Nightmare    Acute Sumatriptan     Allergy   Zofran/ Compazine      Migraine  1-2  Monthly       When there it takes everything away  Medical Cannabis

## 2021-06-03 NOTE — TELEPHONE ENCOUNTER
New Appointment Needed  What is the reason for the visit:    Same Date/Next Day Appt Request  What is the reason for your visit?: Cyst on her back seen on MRI ordered by Chiropractor - follow up needed    Provider Preference: Any available  How soon do you need to be seen?: As soon as available  Waitlist offered?: No  Okay to leave a detailed message:  Yes

## 2021-06-03 NOTE — TELEPHONE ENCOUNTER
RN cannot approve Refill Request    RN can NOT refill this medication med is not covered by policy/route to provider. Last office visit: 9/17/2019 Joshua Dumont MD Last Physical: 4/8/2019 Last MTM visit: Visit date not found Last visit same specialty: 9/17/2019 Joshua Dumont MD.  Next visit within 3 mo: Visit date not found  Next physical within 3 mo: Visit date not found      Ayanna Martines, [facility] [place] [facility] Triage/Med Refill 11/6/2019    Requested Prescriptions   Pending Prescriptions Disp Refills     methIMAzole (TAPAZOLE) 5 MG tablet [Pharmacy Med Name: METHIMAZOLE 5MG TABLETS] 60 tablet 0     Sig: TAKE 1 TABLET BY MOUTH TWICE DAILY       There is no refill protocol information for this order

## 2021-06-04 VITALS
WEIGHT: 102.5 LBS | HEIGHT: 63 IN | SYSTOLIC BLOOD PRESSURE: 95 MMHG | RESPIRATION RATE: 16 BRPM | HEART RATE: 88 BPM | OXYGEN SATURATION: 98 % | BODY MASS INDEX: 18.16 KG/M2 | TEMPERATURE: 98.1 F | DIASTOLIC BLOOD PRESSURE: 62 MMHG

## 2021-06-04 VITALS — WEIGHT: 93 LBS | BODY MASS INDEX: 16.48 KG/M2 | HEIGHT: 63 IN

## 2021-06-04 VITALS
SYSTOLIC BLOOD PRESSURE: 109 MMHG | BODY MASS INDEX: 16.87 KG/M2 | RESPIRATION RATE: 12 BRPM | WEIGHT: 96 LBS | TEMPERATURE: 98.1 F | HEART RATE: 89 BPM | DIASTOLIC BLOOD PRESSURE: 70 MMHG | OXYGEN SATURATION: 98 %

## 2021-06-04 VITALS
BODY MASS INDEX: 17.75 KG/M2 | WEIGHT: 101 LBS | SYSTOLIC BLOOD PRESSURE: 92 MMHG | RESPIRATION RATE: 16 BRPM | OXYGEN SATURATION: 96 % | DIASTOLIC BLOOD PRESSURE: 60 MMHG | HEART RATE: 90 BPM | TEMPERATURE: 98 F

## 2021-06-04 VITALS
BODY MASS INDEX: 18.07 KG/M2 | SYSTOLIC BLOOD PRESSURE: 102 MMHG | OXYGEN SATURATION: 100 % | DIASTOLIC BLOOD PRESSURE: 56 MMHG | HEIGHT: 63 IN | HEART RATE: 84 BPM | WEIGHT: 102 LBS

## 2021-06-04 VITALS
WEIGHT: 99.5 LBS | HEART RATE: 100 BPM | SYSTOLIC BLOOD PRESSURE: 92 MMHG | BODY MASS INDEX: 17.49 KG/M2 | DIASTOLIC BLOOD PRESSURE: 58 MMHG

## 2021-06-04 NOTE — TELEPHONE ENCOUNTER
Central PA team  [phone]  Pool: HE PA MED ([id number])          PA has been initiated.       PA form completed and faxed insurance via Cover My Meds     Key:  : [id number]     Medication:  Emgality 120MG/ML auto-injectors (migraine)    Insurance:  [employer] [place]        Response will be received via fax and may take up to 5-10 business days depending on plan

## 2021-06-04 NOTE — TELEPHONE ENCOUNTER
Prior Authorization Request  Who s requesting:  Pharmacy  Pharmacy Name and Location: [facility]   Medication Name: Emgality  Insurance Plan: Blue Plus  Insurance Member ID Number:  [id number]   Informed patient that prior authorizations can take up to 10 business days for response:   Yes  Okay to leave a detailed message: Yes    Please keep me posted on status of PA. Thanks!     Claudia Tapia, Pharm.D., [facility]  Medication Therapy Management Pharmacist  [facility]

## 2021-06-04 NOTE — PATIENT INSTRUCTIONS - HE
Stop taking Pristiq.  I recommend continuing with lamotrigine, but if you decide to stop taking it, decrease by 50% for 2 weeks, then stop lamotrigine.  Please do not abruptly restart taking lamotrigine, as this has a risk for a severe skin reaction that can be life-threatening.  Please contact crisis or go to the emergency room if you have thoughts of self harm or suicide.  Take medication as prescribed. Please do not make changes or adjustments to your medications without talking to a health professional first.  Contact the pharmacy if you are out of medication and in need of a refill.  Please come back and see me in 6 weeks.

## 2021-06-04 NOTE — PROGRESS NOTES
Assessment/Plan:         Tara was seen today for sore throat.    Diagnoses and all orders for this visit:    Viral pharyngitis: 2d sore throat, neck stiffness, malaise, body aches. Lots of influenza going around and she has indirect strep exposure so we did test for influenza and strep - both were negative. Suspect viral pharyngitis/URI. She does not have severe enough neck stiffness and no fever to suggest meningitis. Will treat with symptomatic therapy including push fluids, rest, OTC analgesics. Discussed concerning symptoms for which to return.    Sore throat  -     Rapid Strep A Screen-Throat swab  -     Group A Strep, RNA Direct Detection, Throat  -     Influenza A/B Rapid Test- Nasal Swab    Body aches  -     Influenza A/B Rapid Test- Nasal Swab                Plan of care was discussed with the patient and/or guardian. They verbalize understanding of the treatment options and plan of care.    Leydi Garcia       Subjective:        Tara Sorenson is a 29 y.o. female who presents for sore throat, neck stiffness, ear pain.   Worst symptom is sore throat. Hurts to swallow. Back and side of neck feels sore, a little stiff, but not severe stiffness, more just soreness.   Has 4 kids at home, several are sick, one was exposed to strep at school though does not have symptoms himself.  Feels generally run down, some general body aches. No fever that she knows of but has not checked. Has had some chills.   Occasional cough. Mild rhinorrhea.   No shortness of breath, chest pain, eye discharge or pain, dizziness, nausea, vomiting.     Past medical history, meds, allergies reviewed and are current in Epic.  Former smoker.             Objective:       Blood pressure 109/70, pulse 89, temperature 98.1  F (36.7  C), temperature source Oral, resp. rate 12, weight (!) 96 lb (43.5 kg), last menstrual period 04/05/2019, SpO2 98 %, not currently breastfeeding.   Gen: well appearing, no distress  Card: RRR, no  murmur, normal S1/S2. No pedal edema.  Resp: clear to auscultation bilaterally, no wheeze or crackles.   HEENT: Head - normocephalic, atraumatic  Eyes - normal lids and conjuntivae, EOMs intact   Nose - no deformity, without masses, no rhinorrhea  Oropharynx - Mild posterior pharyngeal erythema. moist mucous membranes   Ears: TMs normal bilaterally, normal canals.   Neck: anterior cervical lymphadenopathy - shotty. Otherwise full ROM in neck - can put chin to chest.     Results for orders placed or performed in visit on 12/18/19   Rapid Strep A Screen-Throat swab   Result Value Ref Range    Rapid Strep A Antigen No Group A Strep detected, presumptive negative No Group A Strep detected, presumptive negative   Influenza A/B Rapid Test- Nasal Swab   Result Value Ref Range    Influenza  A, Rapid Antigen No Influenza A antigen detected No Influenza A antigen detected    Influenza B, Rapid Antigen No Influenza B antigen detected No Influenza B antigen detected
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
## 2021-06-04 NOTE — TELEPHONE ENCOUNTER
RN cannot approve Refill Request    RN can NOT refill this medication med is not covered by policy/route to provider. Last office visit: 9/17/2019 Joshua Dumont MD Last Physical: 4/8/2019 Last MTM visit: Visit date not found Last visit same specialty: 9/17/2019 Joshua Dumont MD.  Next visit within 3 mo: 12/6/2019 Joshua Dumont MD  Next physical within 3 mo: Visit date not found      Phyllis Hamilton, [facility] Triage/Med Refill 12/7/2019    Requested Prescriptions   Pending Prescriptions Disp Refills     methIMAzole (TAPAZOLE) 5 MG tablet [Pharmacy Med Name: METHIMAZOLE 5MG TABLETS] 60 tablet 0     Sig: TAKE 1 TABLET BY MOUTH TWICE DAILY       There is no refill protocol information for this order

## 2021-06-04 NOTE — TELEPHONE ENCOUNTER
RN cannot approve Refill Request    RN can NOT refill this medication med is not covered by policy/route to provider     . Last office visit: 12/6/2019 Joshua Dumont MD Last Physical: 4/8/2019 Last MTM visit: Visit date not found Last visit same specialty: 12/6/2019 Joshua Dumont MD.  Next visit within 3 mo: Visit date not found  Next physical within 3 mo: Visit date not found      Leydi Diaz, [facility] Triage/Med Refill 12/25/2019    Requested Prescriptions   Pending Prescriptions Disp Refills     methIMAzole (TAPAZOLE) 5 MG tablet [Pharmacy Med Name: METHIMAZOLE 5MG TABLETS] 60 tablet 0     Sig: TAKE 1 TABLET BY MOUTH TWICE DAILY       There is no refill protocol information for this order

## 2021-06-04 NOTE — PATIENT INSTRUCTIONS - HE
Good to have Claudia Tapia come in and talk about CGRP inhibitors    We need to get a hold of your back MRI  I would like you to see Soraida Garcia with [facility] she is awesome she is a sports medicine doctor she can help with both the back as well as your left knee as well as your SI joint and lumbar pain    We need to figure out what the ultrasound showed for the ovarian cyst  It is reassuring it was not there in April  Dr. Rose Rojo at [facility] [place], operated by [facility] can help work this up and work through treatment options    Consider seeingDr. Wright for Botox injections as these can be super helpful for migraine prevention as well    We have in a check your thyroid function test today as well as liver function test    Like you to restart the Seroquel 50 mg at bedtime  I would like you to follow-up monthly with Claudia Tapia    Restart medical cannabis I have recertified you

## 2021-06-04 NOTE — TELEPHONE ENCOUNTER
"Pt reports both hands become painful \"4\" and veins bulge, arms feel \"tight\". Started about a week ago, increasing in frequency over past week, veins very prominent. Pt reports she is  underweight at #93 lbs and 5'3. Lost 10 lbs in past month and not trying to. Pt states she thought it was her thyroid medication but now not sure. Hands are \"red\" when this occurs. Able to go about most normal activities.     Advised pt to be seen in clinic within next two weeks per protocol. If swelling occurs, pain increases or new symptoms develop or if symptoms worsen overall call back.     Pt verbalizes understanding and agrees to plan.     Reason for Disposition    [1] MILD pain (e.g., does not interfere with normal activities) AND [2] present > 7 days    Protocols used: ARM PAIN-A-AH      "

## 2021-06-04 NOTE — TELEPHONE ENCOUNTER
RN cannot approve Refill Request    RN can NOT refill this medication med is not covered by policy/route to provider. Last office visit: 12/6/2019 Joshua Dumont MD Last Physical: 4/8/2019 Last MTM visit: Visit date not found Last visit same specialty: 12/6/2019 Joshua Dumont MD.  Next visit within 3 mo: Visit date not found  Next physical within 3 mo: Visit date not found      Phyllis Hamilton, [facility] Triage/Med Refill 1/5/2020    Requested Prescriptions   Pending Prescriptions Disp Refills     magnesium oxide (MAG-OX) 400 mg (241.3 mg magnesium) tablet [Pharmacy Med Name: MAG-OXIDE 400MG TABLETS] 90 tablet 0     Sig: TAKE 1 TABLET BY MOUTH DAILY       There is no refill protocol information for this order

## 2021-06-04 NOTE — TELEPHONE ENCOUNTER
New Appointment Needed  What is the reason for the visit:    Pre-Op Appt Request  When is the surgery? :  1/10/2020  Where is the surgery?:   [facility]  Who is the surgeon? : Dr. Jones  What type of surgery is being done?: Ovarian Cyst Removal  Provider Preference: PCP only  How soon do you need to be seen?: As soon as possible, but only available on Wednesdays.  Okay to leave a detailed message:  Yes

## 2021-06-04 NOTE — TELEPHONE ENCOUNTER
Left detailed message for patient advising her that Dr. Dumont does not work on wednesdays but if we can scheduled another day if that works otherwise scheduling with another provider will work too if she is okay with that.     CHRISTALP, CMA

## 2021-06-04 NOTE — TELEPHONE ENCOUNTER
Called patient to let her know to have Dr. Wright prescribe. No answer, left voicemail.     Claudia Tapia, Pharm.D., BCACP  Medication Therapy Management Pharmacist  [facility]

## 2021-06-05 VITALS
DIASTOLIC BLOOD PRESSURE: 70 MMHG | OXYGEN SATURATION: 100 % | SYSTOLIC BLOOD PRESSURE: 129 MMHG | HEART RATE: 88 BPM | TEMPERATURE: 98.3 F | BODY MASS INDEX: 18.21 KG/M2 | WEIGHT: 103.6 LBS | RESPIRATION RATE: 16 BRPM

## 2021-06-05 VITALS
SYSTOLIC BLOOD PRESSURE: 90 MMHG | HEART RATE: 90 BPM | BODY MASS INDEX: 18.46 KG/M2 | OXYGEN SATURATION: 99 % | WEIGHT: 105.06 LBS | DIASTOLIC BLOOD PRESSURE: 50 MMHG | TEMPERATURE: 98.1 F

## 2021-06-05 NOTE — ANESTHESIA POSTPROCEDURE EVALUATION
Patient: Tara Sorenson  LAPAROSCOPIC RIGHT OVARIAN CYSTECTOMY  Anesthesia type: general    Patient location: PACU  Last vitals:   Vitals Value Taken Time   BP 91/50 1/10/2020 10:27 AM   Temp 37.2  C (98.9  F) 1/10/2020 10:27 AM   Pulse 72 1/10/2020 10:29 AM   Resp 16 1/10/2020 10:27 AM   SpO2 97 % 1/10/2020 10:29 AM   Vitals shown include unvalidated device data.  Post vital signs: stable  Level of consciousness: awake and responds to simple questions  Post-anesthesia pain: pain controlled  Post-anesthesia nausea and vomiting: no  Pulmonary: unassisted, return to baseline  Cardiovascular: stable and blood pressure at baseline  Hydration: adequate  Anesthetic events: no    QCDR Measures:  ASA# 11 - Lashanda-op Cardiac Arrest: ASA11B - Patient did NOT experience unanticipated cardiac arrest  ASA# 12 - Lashanda-op Mortality Rate: ASA12B - Patient did NOT die  ASA# 13 - PACU Re-Intubation Rate: ASA13B - Patient did NOT require a new airway mgmt  ASA# 10 - Composite Anes Safety: ASA10A - No serious adverse event    Additional Notes:

## 2021-06-05 NOTE — TELEPHONE ENCOUNTER
RN cannot approve Refill Request    RN can NOT refill this medication med is not covered by policy/route to provider. Last office visit: 12/6/2019 Joshua Dumont MD Last Physical: 4/8/2019 Last MTM visit: Visit date not found Last visit same specialty: 12/6/2019 Joshua Dumont MD.  Next visit within 3 mo: Visit date not found  Next physical within 3 mo: Visit date not found      Ayanna Martines, [facility] Triage/Med Refill 2/4/2020    Requested Prescriptions   Pending Prescriptions Disp Refills     methIMAzole (TAPAZOLE) 5 MG tablet [Pharmacy Med Name: METHIMAZOLE 5MG TABLETS] 60 tablet 0     Sig: TAKE 1 TABLET BY MOUTH TWICE DAILY       There is no refill protocol information for this order
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
## 2021-06-05 NOTE — ANESTHESIA PREPROCEDURE EVALUATION
Anesthesia Evaluation      Patient summary reviewed   No history of anesthetic complications     Airway   Mallampati: I  Neck ROM: full   Pulmonary - negative ROS and normal exam    breath sounds clear to auscultation  (+) a smoker (current)                         Cardiovascular - negative ROS  Exercise tolerance: > or = 4 METS  (-) murmur  Rhythm: regular  Rate: normal,    no murmur      Neuro/Psych    (+) neuromuscular disease (cervical disc herniation),  depression, anxiety/panic attacks,     Comments: Migraines  Hx of meth abuse in remission  Bipolar disease    Endo/Other    (+) hyperthyroidism,      GI/Hepatic/Renal - negative ROS      Other findings: Endorses some paresthesias down the left shoulder w/ cervical flexion, extension does not generally elicit the symptoms      Dental - normal exam                        Anesthesia Plan  Planned anesthetic: general endotracheal and total IV anesthesia  GETA.  TIVA.  High risk for PONV and plan for scopolamine patch, dexamethasone, zofran.  Ketamine 50 mg for opioid sparing.  Ketorolac 30 mg at EOC.  ASA 2   Induction: intravenous   Anesthetic plan and risks discussed with: patient and parent/guardian  Anesthesia plan special considerations: antiemetics,   Post-op plan: routine recovery

## 2021-06-06 NOTE — TELEPHONE ENCOUNTER
Refill Approved    Rx renewed per Medication Renewal Policy. Medication was last renewed on 09/21/2019    Leah Rodríguez, [facility] Triage/Med Refill 2/17/2020     Requested Prescriptions   Pending Prescriptions Disp Refills     rizatriptan (MAXALT) 10 MG tablet [Pharmacy Med Name: RIZATRIPTAN 10MG TABLETS] 9 tablet 1     Sig: TAKE 1 TABLET BY MOUTH AS NEEDED FOR MIGRAINE. MAY REPEAT IN 2 HOURS       Triptans Refill Protocol Passed - 2/12/2020  1:19 PM        Passed - PCP or prescribing provider visit in past 12 months       Last office visit with prescriber/PCP: 12/6/2019 Joshua Dumont MD OR same dept: 12/6/2019 Joshua Dumont MD OR same specialty: 12/6/2019 Joshua Dumont MD  Last physical: 4/8/2019 Last MTM visit: Visit date not found   Next visit within 3 mo: Visit date not found  Next physical within 3 mo: Visit date not found  Prescriber OR PCP: Joshua Dumont MD  Last diagnosis associated with med order: 1. Migraine without aura and with status migrainosus, not intractable  - rizatriptan (MAXALT) 10 MG tablet [Pharmacy Med Name: RIZATRIPTAN 10MG TABLETS]; TAKE 1 TABLET BY MOUTH AS NEEDED FOR MIGRAINE. MAY REPEAT IN 2 HOURS  Dispense: 9 tablet; Refill: 1    If protocol passes may refill for 12 months if within 3 months of last provider visit (or a total of 15 months).

## 2021-06-06 NOTE — TELEPHONE ENCOUNTER
Medication:   Disp Refills Start End ZAYRA   rizatriptan (MAXALT) 10 MG tablet 9 tablet 1 2/17/2020  No   Sig: TAKE 1 TABLET BY MOUTH AS NEEDED FOR MIGRAINE. MAY REPEAT IN 2 HOURS         Pharmacy:[id number] - [place], [place] - [address] AT [facility] [address]    Last Office Visit:1/8/2020

## 2021-06-07 NOTE — TELEPHONE ENCOUNTER
Date of Last Office Visit: 12/9/19  Date of Next Office Visit: 5/11/20  No shows since last visit: 12/16/19, 1/20/20  Cancellations since last visit: none  ED visits since last visit:  none  Medication lamotrigine 200mg date last ordered: 10/28/19  Qty: 30  Refills: 5  Lapse in therapy greater than 7 days: yes  Medication refill request verified as identical to current order: yes  Result of Last DAM, VPA, Li+ Level, CBC, or Carbamazepine Level (at or since last visit): N/A     [] Medication refilled per [facility] M-1.   [x] Medication unable to be refilled by RN due to criteria not met as indicated below:     []Eligibility - not seen in last year    []Supervision - no future appointment    []Compliance     []Verification - order discrepancy    []Controlled Medication    [x]Medication not included in RN Protocol    []90 - day supply request    []Other   Current Medication list:    acetaminophen (TYLENOL) 500 MG tablet  Take 1,000 mg by mouth every 6 (six) hours as needed for pain.    ALPRAZolam (XANAX) 0.5 MG tablet  Take 1 tablet (0.5 mg total) by mouth every 12 (twelve) hours as needed for anxiety (for panic).    dextroamphetamine-amphetamine (ADDERALL XR) 15 MG 24 hr capsule  Take 1 capsule (15 mg total) by mouth daily.    ibuprofen (ADVIL,MOTRIN) 200 MG tablet  Take 600 mg by mouth every 8 (eight) hours as needed for pain.    lamoTRIgine (LAMICTAL) 200 MG tablet  Take 1 tablet (200 mg total) by mouth daily.    magnesium oxide (MAG-OX) 400 mg (241.3 mg magnesium) tablet  TAKE 1 TABLET BY MOUTH DAILY    methIMAzole (TAPAZOLE) 5 MG tablet  TAKE 1 TABLET BY MOUTH TWICE DAILY    riboflavin, vitamin B2, 100 mg Tab  2 tablets daily for migraine prophylaxis    rizatriptan (MAXALT-MLT) 10 MG disintegrating tablet  TAKE 1 TABLET BY MOUTH AS NEEDED FOR MIGRAINE. MAY REPEAT IN 2 HOURS IF NEEDED    topiramate (TOPAMAX) 25 MG tablet  Take 25 mg by mouth daily.    UNABLE TO FIND  Med Name: Medical canabis    methIMAzole (TAPAZOLE) 5  MG tablet (Discontinued)  TAKE 1 TABLET BY MOUTH TWICE DAILY        Medication Plan of Care at last office visit with MD/CNP:    1. Major depressive disorder  As I do each meeting with Ms. Indira Sorenson,  I reviewed the treatments for both MDD and BPAD as per her history, there have been episodes that may best be accounted for by borderline personality disorder, and or chronic PTSD. Genesight testing rereviewed today. I reviewed her diagnostic profile at length today. She endorses a believe that borderline personality disorder continues to  Present challenges with regards to anxiety, mood, and disruptions in relationships.    Continue/restart individual therapy - previously with [facility], but she says it has been some time. Reviewed DBT principles with her.  She has not been able to get there yet, but says she has an appointment.    Continue lamotrogine as mood stabilizer and for depression - well tolerated and she will continue on this, benefit is reported by client as feeling less reactive to stresses though she continues to have wide mood fluctuations,    F/U 6 weeks    Discontinue quetiapine.  Client states that she feels unsafe with this medication due to past overdoses on this.

## 2021-06-07 NOTE — TELEPHONE ENCOUNTER
RN cannot approve Refill Request    RN can NOT refill this medication med is not covered by policy/route to provider     . Last office visit: 12/6/2019 Joshua Dumont MD Last Physical: 4/8/2019 Last MTM visit: Visit date not found Last visit same specialty: 12/6/2019 Joshua Dumont MD.  Next visit within 3 mo: Visit date not found  Next physical within 3 mo: Visit date not found      Leydi Diaz, [facility] Triage/Med Refill 4/6/2020    Requested Prescriptions   Pending Prescriptions Disp Refills     methIMAzole (TAPAZOLE) 5 MG tablet [Pharmacy Med Name: METHIMAZOLE 5MG TABLETS] 60 tablet 0     Sig: TAKE 1 TABLET BY MOUTH TWICE DAILY       There is no refill protocol information for this order        magnesium oxide (MAG-OX) 400 mg (241.3 mg magnesium) tablet [Pharmacy Med Name: MAG-OXIDE 400MG TABLETS] 90 tablet 0     Sig: TAKE 1 TABLET BY MOUTH DAILY       There is no refill protocol information for this order

## 2021-06-07 NOTE — TELEPHONE ENCOUNTER
Pt needing a refill of lamotrigine 200mg. PT sent request to pharmacy but they need order from doctor.

## 2021-06-07 NOTE — TELEPHONE ENCOUNTER
Medication Request  Medication name: metroNIDAZOLE (FLAGYL) 500 MG tablet   Requested Pharmacy: [facility] #[id number]  Reason for request: Patient stated she still has bacterial vaginosis  When did you use medication last?:  4/18  Patient offered appointment:  n/a  Okay to leave a detailed message: yes  [phone]

## 2021-06-07 NOTE — PROGRESS NOTES
"Tara Sorenson is a 30 y.o. female who is being evaluated via a billable telephone visit.      The patient has been notified of following:     \"This telephone visit will be conducted via a call between you and your physician/provider. We have found that certain health care needs can be provided without the need for a physical exam.  This service lets us provide the care you need with a short phone conversation.  If a prescription is necessary we can send it directly to your pharmacy.  If lab work is needed we can place an order for that and you can then stop by our lab to have the test done at a later time.    Telephone visits are billed at different rates depending on your insurance coverage. During this emergency period, for some insurers they may be billed the same as an in-person visit.  Please reach out to your insurance provider with any questions.    If during the course of the call the physician/provider feels a telephone visit is not appropriate, you will not be charged for this service.\"    Patient has given verbal consent to a Telephone visit? Yes    Patient would like to receive their AVS by AVS Preference: Mail a copy.    Additional provider notes:       Subjective   Chief Complaint:  Vaginitis (Irration x2 days)    HPI:   Tara Sorenson is a 30 y.o. female who presents for vaginitis.    Virtual visit 4/7 for sore throat and vaginitis symptoms. Treated with clindamycin orally.  Strep symptoms cleared but clindamycin was causing stomach discomfort.  Switched to amoxicillin and metronidazole.  Took a full seven days. Felt symptoms really cleared until last Friday night when she had intercourse and felt that symptoms flared up. Vaginal introitus is irritated.  Discharge is yellowish and more copious. No odor but isn't typically.         Allergies:  is allergic to haloperidol; compazine [prochlorperazine]; and metoclopramide hcl.    SH/FH:  Social History and Family History reviewed and " updated.   Tobacco Status:  She  reports that she has been smoking cigarettes. She has been smoking about 0.25 packs per day. She has never used smokeless tobacco.    Review of Systems:  A complete head to toe ROS is negative unless otherwise noted in HPI    Assessment & Plan   1. Acute vaginitis  Recent treatment for BV and now return of symptoms also accompanied by vaginal irritation/itching.  Discussed possible yeast infection after recent antibiotics. Recommended step-wise course of treatment starting with Diflucan but she requests treating concurrently for both BV and yeast.  Prescriptions sent.  Follow up if no improvement.   - metroNIDAZOLE (FLAGYL) 500 MG tablet; Take 1 tablet (500 mg total) by mouth 2 (two) times a day for 7 days.  Dispense: 14 tablet; Refill: 0  - fluconazole (DIFLUCAN) 150 MG tablet; Take 1 tablet (150 mg total) by mouth once for 1 dose.  Dispense: 1 tablet; Refill: 0    Kaykay Wasserman CNP    Phone call duration:  10 minutes

## 2021-06-07 NOTE — TELEPHONE ENCOUNTER
RN cannot approve Refill Request    RN can NOT refill this medication med is not covered by policy/route to provider     . Last office visit: 12/6/2019 Joshua Dumont MD Last Physical: 4/8/2019 Last MTM visit: Visit date not found Last visit same specialty: 12/6/2019 Joshua Dumont MD.  Next visit within 3 mo: Visit date not found  Next physical within 3 mo: Visit date not found      Leydi Diaz, [facility] Triage/Med Refill 5/5/2020    Requested Prescriptions   Pending Prescriptions Disp Refills     methIMAzole (TAPAZOLE) 5 MG tablet [Pharmacy Med Name: METHIMAZOLE 5MG TABLETS] 60 tablet 0     Sig: TAKE 1 TABLET BY MOUTH TWICE DAILY       There is no refill protocol information for this order

## 2021-06-07 NOTE — TELEPHONE ENCOUNTER
Medication:   Disp  Refills  Start  End  ZAYRA    metroNIDAZOLE (FLAGYL) 500 MG tablet  14 tablet  0  4/9/2020 4/16/2020  --    Sig - Route: Take 1 tablet (500 mg total) by mouth 2 (two) times a day for 7 days. - Oral        Pharmacy:[facility] #[id number] - [place], [place] - [address] AT [facility] [address]     Last Office Visit:4/7/20

## 2021-06-07 NOTE — TELEPHONE ENCOUNTER
Clindamycin is for the strep and Bacterial Vaginosis    Either continue and take with food    Or needs 2 meds    Penicillin + Flagyl    Ask preference    Ivan

## 2021-06-07 NOTE — TELEPHONE ENCOUNTER
Medication Request  Medication name:    Disp  Refills  Start  End  ZAYRA    metroNIDAZOLE (FLAGYL) 500 MG tablet  14 tablet  0  1/19/2020 1/26/2020  --    Sig - Route: Take 1 tablet (500 mg total) by mouth 2 (two) times a day for 7 days. - Oral    Sent to pharmacy as: metroNIDAZOLE 500 mg tablet (FLAGYL)    E-Prescribing Status: Receipt confirmed by pharmacy (1/19/2020  2:12 PM CST)        Requested Pharmacy: [facility]  Reason for request: caller stated that the Clindamycin is not helping her but is making her feel more sick. Caller would like to request for a different one.   When did you use medication last?:    Patient offered appointment:  patient declined  Okay to leave a detailed message: yes

## 2021-06-08 NOTE — TELEPHONE ENCOUNTER
RN cannot approve Refill Request    RN can NOT refill this medication med is not covered by policy/route to provider     . Last office visit: 12/6/2019 Joshua Dumont MD Last Physical: 4/8/2019 Last MTM visit: Visit date not found Last visit same specialty: 12/6/2019 Joshua Dumont MD.  Next visit within 3 mo: Visit date not found  Next physical within 3 mo: Visit date not found      Leydi Diaz, [facility] Triage/Med Refill 6/4/2020    Requested Prescriptions   Pending Prescriptions Disp Refills     methIMAzole (TAPAZOLE) 5 MG tablet [Pharmacy Med Name: METHIMAZOLE 5MG TABLETS] 60 tablet 0     Sig: TAKE 1 TABLET BY MOUTH TWICE DAILY       There is no refill protocol information for this order

## 2021-06-08 NOTE — PROGRESS NOTES
Psychiatric Nurse Practitioner Progress Note    Date: 05/11/2020  Care Provider: Callie Brown CNP    Assessment / Impression    1. Major depressive disorder  As I do each meeting with Ms. Indira Sorenson,  I reviewed the treatments for both MDD and BPAD as per her history, there have been episodes that may best be accounted for by borderline personality disorder, and or chronic PTSD. Genesight testing has been done, and desvenlafaxine and vortioxetine have no gene drug interactions for her.  I reviewed her diagnostic profile at length today. She endorses a believe that borderline personality disorder continues to  Present challenges with regards to anxiety, mood, and disruptions in relationships.    Start psychotherapy in the future, client is not sure where she would find time in her schedule right now to do this.  She was previously with delaney Lozano [facility].    Continue lamotrogine as mood stabilizer and for depression - well tolerated and she will continue on this, benefit is reported by client as feeling less reactive to stresses though she continues to have wide mood fluctuations,    F/U 3 months     2. Chronic PTSD:     Restart individual therapy in the future, client is not sure where she would find time in her schedule right now to do this.,    desvenlafaxine discontinued due to side effects    Not interested in other medication therapies at this time      3. Generalized Anxiety Disorder    Restart individual therapy in the future, client is not sure where she would find time in her schedule right now to do this.,    Reviewed treatment of nightmares with prazosin, though she describes her nightmares as fairly infrequent and not too intense-I do not believe it reaches criteria to prescribe a medication at this time,    Continue retrial of desvenlafaxine, dose is currently at 50mg daily for two more weeks, if no improvement, than increase to 100mg daily.    [employer] genetic testing indicates desvenlafaxine and  "vortioxetine both have no gene-drug interactions.     4.  Borderline Personality Disorder    I reviewed the main principles of dialectical behavioral therapy.  She says she has done this in the past.  She is not interested in restarting at this time.  She did not feel it was particularly helpful when she did previously.    Motivational interviewing posture was taken after learning her reluctance to participate in dialectical behavioral therapy.        Clinical Global Impressions  5-markedly ill.    CC: \"I'm not sure how helpful it is with my mood, but lamotrigine helps with my headaches.\"      HPI: Tara Sorenson is a 30 y.o. female with major depressive disorder recurrent mild, chronic posttraumatic stress disorder, generalized anxiety disorder, and borderline personality disorder.    She says when she misses a dose of lamorigine, she \"can't see\".  By not being able to see, she means that she gets aura of migraine headaches.  She reports that in general, her migraine headaches have improved substantially with the following regime.  She gets auras if she forgets meds. She takes topiramate, methimazole, vit b, and magnesium and lamotrigine and she says her headaches have not happened nearly as often.  When she does get a headache, she takes rizatriptan and ibuprofen. She uses medical cannabis.      She says she split with her partner who moved out, but then asked her partner to move back in to [facility] with .    She is working at [employer]. 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      She has her older kids are in a different day care from the younger ones.      She says she continues to have challenges with her partner. She feels she give a lot emotionally and gets very little back.     She lives wither her sister. She says she feels \"hopeless.\" \"I feel like there is something out there that could help me, but its not the right time now.\"    She says she abe like tostay on " "lmaotrigine.      She says she went through a \"period of two weeks\" when she was \"crying every day\" and very depressed, and that has resolved, where she is not feeling the same way.  She thinks that she had a major depressive episode.    I did review treatment options including risks benefits and potential adverse reactions with the patient verbalizing understanding of this, and she says that she is not interested in making any medication changes at this time.  She agrees with my recommendation for individual psychotherapy, and intends to start this when she can.  He has no thoughts of self-harm or suicide.    Allergies   Allergen Reactions     Haloperidol Anxiety     Felt anxious at 20hrs post single 2mg IV dose of haloperidol lactate      Compazine [Prochlorperazine] Other (See Comments)     Anxiety      Metoclopramide Hcl Other (See Comments) and Unknown     \"It makes me feel like jumping out of my skin.\"  \"wanted to jump out of my skin\"     Current Outpatient Medications   Medication Sig Dispense Refill     acetaminophen (TYLENOL) 500 MG tablet Take 1,000 mg by mouth every 6 (six) hours as needed for pain.       ALPRAZolam (XANAX) 0.5 MG tablet Take 1 tablet (0.5 mg total) by mouth every 12 (twelve) hours as needed for anxiety (for panic). 20 tablet 0     ibuprofen (ADVIL,MOTRIN) 200 MG tablet Take 600 mg by mouth every 8 (eight) hours as needed for pain.       lamoTRIgine (LAMICTAL) 200 MG tablet TAKE 1 TABLET BY MOUTH EVERY DAY 30 tablet 5     magnesium oxide (MAG-OX) 400 mg (241.3 mg magnesium) tablet TAKE 1 TABLET BY MOUTH DAILY 90 tablet 0     methIMAzole (TAPAZOLE) 5 MG tablet TAKE 1 TABLET BY MOUTH TWICE DAILY 60 tablet 0     riboflavin, vitamin B2, 100 mg Tab 2 tablets daily for migraine prophylaxis 60 tablet 12     rizatriptan (MAXALT-MLT) 10 MG disintegrating tablet TAKE 1 TABLET BY MOUTH AS NEEDED FOR MIGRAINE. MAY REPEAT IN 2 HOURS IF NEEDED 9 tablet 2     topiramate (TOPAMAX) 25 MG tablet Take 25 mg " by mouth daily.   6     UNABLE TO FIND Med Name: Medical canabis       No current facility-administered medications for this visit.          Medication adherence: Reviewed risk/benefits of medication , Patient able to verbalize understanding of side effects  and Patient verbally consents to taking medications  Medication side effects: none  [place] Prescription Monitoring program: No worrisome pharmacy activity    Clinical Outcomes Measures:  PHQ-9: PHQ-9 Total Score: 15  AURELIO-7:  20      Lab Results: Reviewed.  No visits with results within 30 Day(s) from this visit.   Latest known visit with results is:   Office Visit on 02/10/2020   Component Date Value     Color, UA 02/10/2020 Yellow      Clarity, UA 02/10/2020 Clear      Glucose, UA 02/10/2020 Negative      Bilirubin, UA 02/10/2020 Negative      Ketones, UA 02/10/2020 Negative      Specific Gravity, UA 02/10/2020 1.025      Blood, UA 02/10/2020 Small*     pH, UA 02/10/2020 6.0      Protein, UA 02/10/2020 Trace*     Urobilinogen, UA 02/10/2020 1.0 E.U./dL      Nitrite, UA 02/10/2020 Negative      Leukocytes, UA 02/10/2020 Negative      Bacteria, UA 02/10/2020 Many*     RBC, UA 02/10/2020 10-25*     WBC, UA 02/10/2020 0-5      Squam Epithel, UA 02/10/2020 5-10*     Yeast Result 02/10/2020 No yeast seen      Trichomonas 02/10/2020 No Trichomonas seen      Clue Cells, Wet Prep 02/10/2020 No Clue cells seen      Culture 02/10/2020 No Growth        Review of Systems: As noted above, otherwise a 12 point review of systems is negative.    Psychiatric Examination:  There were no vitals filed for this visit.  There is no height or weight on file to calculate BMI.  Reliability:  Patient appears to be an adequate historian.    Behavior: Engages with normal rapport in the interview.   There is no evidence of responding to hallucinations or flashbacks.  Speech: Speech is coherent, with a normal rate, and with some urge to keep talking.  Language:There are no difficulties  "with expressive or receptive language as observed throughout the interview.    Mood: Described as \"I was really depressed, but now I'm through that\" \"stressed\"    Affect: congruently stressed, anxious    Judgement: Able to make basic decision regarding safety.  Insight: Fair  Thought process: Logical with anxious cognitions  Thought content: No evidence of delusions or paranoia.  No thoughts of self harm or suicide. No thoughts of harming others.  Associations: Connected  Fund of knowledge: Average  Attention / Concentration: Able to remain focused during the interview with minimal distractibility or need for redirection.  Short Term Memory: Grossly intact as evidence by client recalling themes and ideas discussed.  Long Term Memory: Intact    Tara Sorenson is a 30 y.o. female who is being evaluated via a billable telephone visit.      The patient has been notified of following:     \"This telephone visit will be conducted via a call between you and your physician/provider. We have found that certain health care needs can be provided without the need for a physical exam.  This service lets us provide the care you need with a short phone conversation.  If a prescription is necessary we can send it directly to your pharmacy.  If lab work is needed we can place an order for that and you can then stop by our lab to have the test done at a later time.    Telephone visits are billed at different rates depending on your insurance coverage. During this emergency period, for some insurers they may be billed the same as an in-person visit.  Please reach out to your insurance provider with any questions.    If during the course of the call the physician/provider feels a telephone visit is not appropriate, you will not be charged for this service.\"    Patient has given verbal consent to a Telephone visit? Yes    TIME: [id number] - [id number].  27 minutes.  Callie Brown, IGNACIO Brown  Psychiatry Nurse " Practitioner

## 2021-06-08 NOTE — PATIENT INSTRUCTIONS - HE
Please contact crisis or go to the emergency room if you have thoughts of self harm or suicide.  Take medication as prescribed. Please do not make changes or adjustments to your medications without talking to a health professional first.  Contact the pharmacy if you are out of medication and in need of a refill.  Consider stopping smoking, it is the single most important thing you can do to improve your health.    F/U in three months to review medications.   Please call me sooner if you would like help locating a new therpist.

## 2021-06-09 NOTE — PROGRESS NOTES
Patient no show for first follow up with this provider on 3/2/17 at 10 AM.  The undersigned contacts patient at number on file in Epic, [phone], and left discrete message requesting return call to discuss scheduling.  Patient returned call immediately, and apologized for missing her appointment.  Her vehicle broke down and required corrina yesterday.  She anticipates it will be repaired by Monday and requested an appointment next week.    Agreed on reschedule follow up appointment for Thursday, 3/9/17 at 1PM.    FADIA Elaine

## 2021-06-09 NOTE — TELEPHONE ENCOUNTER
RN cannot approve Refill Request    RN can NOT refill this medication Protocol failed and NO refill given. Last office visit: 12/6/2019 Joshua Dumont MD Last Physical: 4/8/2019 Last MTM visit: Visit date not found Last visit same specialty: 12/6/2019 Joshua Dumont MD.  Next visit within 3 mo: Visit date not found  Next physical within 3 mo: Visit date not found      Phyllis Hamilton, [facility] Triage/Med Refill 7/3/2020    Requested Prescriptions   Pending Prescriptions Disp Refills     magnesium oxide (MAG-OX) 400 mg (241.3 mg magnesium) tablet [Pharmacy Med Name: MAG-OXIDE 400MG TABLETS] 90 tablet 0     Sig: TAKE 1 TABLET BY MOUTH DAILY       There is no refill protocol information for this order        methIMAzole (TAPAZOLE) 5 MG tablet [Pharmacy Med Name: METHIMAZOLE 5MG TABLETS] 60 tablet 0     Sig: TAKE 1 TABLET BY MOUTH TWICE DAILY       There is no refill protocol information for this order

## 2021-06-09 NOTE — TELEPHONE ENCOUNTER
Test Results  Who is calling?:  Patient  Who ordered the test:  Joshua Dumont MD  Type of test: Lab  Date of test:  07/16/2020  Where was the test performed:  In clinic  What are your questions/concerns?:  Pt wanted to know results of tests.  Writer relayed message to Pt: Vitamin D is low Replace more add 2000 IU daily   THyroid is LOW now   I would for the time being:   Stop the Methimazole and recheck in 3 months. TSH and Free T4   TSH goal 1.0           DanL  Pt agrees, and understands.    Okay to leave a detailed message?:  No

## 2021-06-10 NOTE — PROGRESS NOTES
This video/telephone visit will be conducted via a call between you and your physician/provider. We have found that certain health care needs can be provided without the need for an in-person physical exam. This service lets us provide the care you need with a video /telephone conversation. If a prescription is necessary we can send it directly to your pharmacy. If lab work is needed we can place an order for that and you can then stop by our lab to have the test done at a later time.    Just as we bill insurance for in-person visits, we also bill insurance for video/telephone visits. If you have questions about your insurance coverage, we recommend that you speak with your insurance company.    Patient has given verbal consent for video/Telephone visit? yes  Patient would like the video visit invitation sent by: Text to cell phone: LUCINDA or Send to email: LUCINDA  Pt said she is at work and asked to change to telephone visit. Pt wants to continue with her current dose of Lamictal.  CMA/LPN:AD, LPN    Patient verified allergies, medications and pharmacy via phone. Pt declined PHQ and AURELIO. Patient states she is ready for visit.    : no access for this provider    ________________________________________  Medications Phoned  to Pharmacy [] yes [x]no  Name of Pharmacist:  List Medications, including dose, quantity and instructions    Medications ordered this visit were e-scribed.  Verified by order class [] yes  [x] no    Medication changes or discontinuations were communicated to patient's pharmacy: [] yes  [x] no    Dictation completed at time of chart check: [] yes  [x] no    I have checked the documentation for today s encounters and the above information has been reviewed and completed.

## 2021-06-10 NOTE — TELEPHONE ENCOUNTER
RN cannot approve Refill Request    RN can NOT refill this medication Protocol failed and NO refill given. Last office visit: 12/6/2019 Joshau Dumont MD Last Physical: 4/8/2019 Last MTM visit: Visit date not found Last visit same specialty: 12/6/2019 Joshua Dumont MD.  Next visit within 3 mo: Visit date not found  Next physical within 3 mo: Visit date not found      Phyllis Hamilton, [facility] Triage/Med Refill 8/2/2020    Requested Prescriptions   Pending Prescriptions Disp Refills     methIMAzole (TAPAZOLE) 5 MG tablet [Pharmacy Med Name: METHIMAZOLE 5MG TABLETS] 60 tablet 0     Sig: TAKE 1 TABLET BY MOUTH TWICE DAILY       There is no refill protocol information for this order
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
## 2021-06-10 NOTE — TELEPHONE ENCOUNTER
----- Message from Josuha Dumont MD sent at 7/18/2020 10:28 AM CDT -----  Vitamin D is low Replace more add 2000 IU daily   THyroid is LOW now  I would for the time being:  Stop the Methimazole and recheck in 3 months. TSH and Free T4   TSH goal 1.0       DanL

## 2021-06-11 NOTE — TELEPHONE ENCOUNTER
RN cannot approve Refill Request    RN can NOT refill this medication med is not covered by policy/route to provider. Last office visit: Visit date not found Last Physical: Visit date not found Last MTM visit: Visit date not found Last visit same specialty: 12/6/2019 Joshua Dumont MD.  Next visit within 3 mo: Visit date not found  Next physical within 3 mo: Visit date not found      Phyllis Hamilton, [facility] Triage/Med Refill 8/30/2020    Requested Prescriptions   Pending Prescriptions Disp Refills     metroNIDAZOLE (FLAGYL) 500 MG tablet [Pharmacy Med Name: METRONIDAZOLE 500MG TABLETS] 14 tablet 0     Sig: TAKE 1 TABLET BY MOUTH TWICE DAILY FOR 7 DAYS       There is no refill protocol information for this order

## 2021-06-11 NOTE — TELEPHONE ENCOUNTER
COVID Screening     1. Have you had a fever in the last 7 days or have you used fever reducing medicines for a fever in the last 7 days?  no    2. Are you experiencing shortness of breath, cough, muscle aches, loss of taste or loss of smell?  no    3. Are you residing with anyone who has tested positive for Covid?  no    If answered yes to any of the above questions, the pt must be scheduled for an appointment with their PCP.    A message also needs to be sent to the endoscopist to ensure the patient gets rescheduled at a later date.     ENDO screening    1. Have you been admitted overnight to the hospital in the past 3 months? no   If yes, schedule an appointment with PCP before scheduling endoscopic procedure.     2. Have you had a stent placed in the last 12 months? no   If yes, for a screening visit, cancel and message the ordering provider.  The patient will need a new order when the time is appropriate.     3. Have you had a stroke or heart attack in the past 6 months? no   If yes, cancel and refer patient to ordering provider for clearance, also message ordering provider to inform.     4. Have you had any chest pain in the past 3 months? no   If yes, Have you been evaluated by your PCP and/or cardiologist and it was determined to not be heart related? not applicable   If No, Pt needs to be seen by PCP or Cardiologist .  Pt can be scheduled once clearance obtained by either of those providers.     5. Do you take prescription weight loss medications?  no   If yes, must stop for 2 weeks prior to procedure.     6. Have you been diagnosed with diverticulitis within the past 3 months? no   If yes, must have been seen by GI within the last 3 months, if not schedule with GI PAMELA.    If pt has been seen by GI, schedule procedure 8-12 weeks post antibiotic treatment.     7. Are you on Dialysis? no  If yes, schedule procedure for the day AFTER dialysis.  Appt time should be 9am or later, patient arrival time is 2 hours  RN cannot approve Refill Request    RN can NOT refill this medication med is not covered by policy/route to provider. Last office visit: 12/6/2019 Joshua Dumont MD Last Physical: 4/8/2019 Last MTM visit: Visit date not found Last visit same specialty: 12/6/2019 Joshua Dumont MD.  Next visit within 3 mo: Visit date not found  Next physical within 3 mo: Visit date not found      Sowmya Rodriguez, [facility] Triage/Med Refill 10/1/2020    Requested Prescriptions   Pending Prescriptions Disp Refills     magnesium oxide (MAG-OX) 400 mg (241.3 mg magnesium) tablet [Pharmacy Med Name: MAG-OXIDE 400MG TABLETS] 90 tablet 0     Sig: TAKE 1 TABLET BY MOUTH DAILY       There is no refill protocol information for this order            "prior.  Nulytely or    miralax prep for all patients with kidney disease.     8. Are you diabetic?  no   If yes, schedule morning appt. Advise pt to hold all diabetic meds day of procedure.     9. If pt is older than 80 years of age and HAS NOT been seen by GI or PCP within the last 6 months, needs appt with GI PAMELA.   If pt has been seen by the GI provider or PCP within the past 6  months AND meets criteria, schedule procedure AND send message to the endoscopist.     10. Is patient on a "high risk" medication (blood thinner/antiplatelet agent)?  no   If yes, has cardiac clearance been obtained within the last 60 days? N/A   If no, a new clearance needs to be obtained.       I have reviewed the last colonoscopy for recommendations regarding next procedure bowel prep.  no  I have reviewed medications and allergies.  yes  I have verified the pharmacy information and appropriate prep sent if needed. yes  Prep instructions have been mailed or sent to portal per patient request. yes    If answers yes to any of the following, schedule at [facility] ONLY. If No, OK for either location.     Is BMI over 45?   Any complaints of chest pain, new onset or at rest?  Does pt have an AICD?  Is there a diagnosis of heart failure?  Does patient have an insulin pump?  If procedure for esophageal banding?  "

## 2021-06-11 NOTE — PROGRESS NOTES
"Mental Health Visit Note    6/8/17    Start time: 1:09 PM    Stop Time: 2:07 PM   Session # 1    Tara Sorenson is a 27 y.o. female is being seen today for    Chief Complaint   Patient presents with     Follow-up     Depression     Manic Behavior     Anxiety   .     New symptoms or complaints: Patient is re-engaging in therapy after several months.  She was last seen for her Diagnositc Assessment on 11/14/16.  She does not report new concerns today.    Functional Impairment:   Personal: 4  Family: 3  Work: 3  Social:4    Clinical assessment of mental status: The patient was on time for her scheduled session. The patient was pleasant and cooperative. She was oriented ×3. She made appropriate eye contact. She was adequately dressed with good grooming and hygiene. Recent memory appeared intact. Concentration and focus is within normal. Speech: within normal. Mood and affect were labile. Fund of knowledge was adequate. Thought process was within normal limits. Insight and judgment, fair.    Suicidal/Homicidal Ideation present: None Reported This Session    Patient's impression of their current status: Tara reflected on having had \"a rough couple of weeks\" which prompted her to schedule today's appointment .  She discussed her relationship with her sister-in-law, which has deteriorated recently and been a source of stress, as her sister-in-law has viewed her as portraying her poorly to her brother.  She also discussed her relationship with her partner.  She reflected on the values that are most meaningful to her (parenting her children, family) and the behaviors that correspond with them.  She has found it helpful to connect to her support system.    Therapist impression of patients current state: Patient is engaged in the therapeutic process.  Her thoughts, feelings, and behaviors were processed and coping techniques explored.      Type of psychotherapeutic technique provided: Insight oriented, Client " centered, Solution-focused and CBT    Progress toward short term goals:Progress as expected, therapeutic relationship building in progress. Patient receptive to strategies for increased coping in the presence of stressors.    Review of long term goals: discussed Treatment Plan.  Did not have sufficient time in today's session to complete it. Will complete at next follow up.    Diagnosis:   1. Bipolar disorder, unspecified    2. Generalized anxiety disorder        Plan and Follow up: Encouraged patient to practice mindfulness breathing technique, and notice 'unhelpful' thoughts and commit to value-based behaviors in the presence of difficult thoughts and feelings. Encouraged patient to return for follow up regularly, and she agreed to schedule follow up in the next couple weeks.  Plans to connect to her support system.    Discharge Criteria/Planning: Patient will continue with follow-up until therapy can be discontinued without return of signs and symptoms.    Lilian Lozano 6/11/2017      This note was created with help of Dragon dictation software. Grammatical / typing errors are not intentional and inherent to the software.

## 2021-06-12 NOTE — TELEPHONE ENCOUNTER
Spoke with Tara and got her rescheduled with Brianne as NP per Stephanie's directive. Patient would like a follow up in regards to this as she was not aware she was going to be set up with a new provider and just wants to discuss that.    =

## 2021-06-12 NOTE — TELEPHONE ENCOUNTER
The patient states that she has never had a reaction to Cefdinir but her children have and this makes her hesitant to take the medication. In addition to the former concern, the patient states that she does not want to take a medication that is not likely to be helpful per the physicians report.     CMT explained that provider would recommend a trial of the Cefdinir. [facility] also asked the patient to consult with pharmacist regarding major and minor side effects and to self-monitor for these effects. She will call if necessary.     Patient agrees to try the Cefdinir.

## 2021-06-12 NOTE — TELEPHONE ENCOUNTER
Spoke with pt. She was initially confused because she didn't realize provider Stephanie no longer works here. Informed her that a letter went out to her PO [address] on 9/11/20. Pt ok with keeping her appt with IGNACIO Herron, on 11/25/20. However, she states the only medication Ramocaron managed was her lamictal for migraines. She is going to talk with her PCP to see if he's willing to take over that medication. If so, she will call back and cancel her appt with IGNACIO Herron.

## 2021-06-12 NOTE — TELEPHONE ENCOUNTER
I sent in Cefdinir 300 mg two times a day for 10 days  Advil   This will not help remarkably an external blayne Love

## 2021-06-12 NOTE — TELEPHONE ENCOUNTER
Called pt to relay med sent.  Pt responded the the side effects of this Cefdinir are too bad and pt cannot take.  Can something else be given?  Thanks.

## 2021-06-12 NOTE — TELEPHONE ENCOUNTER
Triage call:     Went to  a couple weeks ago and was told that she had an ear infection- Amoxicillin   Took whole round and ear drops (only for a couple days as they were too uncomfortable for her)   Ear infection is getting worse- left ear  No discharge   Throbbing intermittently- 8/10   Pain will resolve randomly   Intermittently runs down the side of her neck- getting more frequently  No fever  No redness to outer ear    Has tried ibuprofen but uncertain if this has helped    Patient is requesting PCP advice- can PCP prescribe a second course of antibiotics? She is hesitant to come in for a visit since she would need to bring her children with her. Please advise.     Pharmacy and allergies verified.     Ayanna Martines RN [facility] Triage/Med Refill 11/2/2020 12:27 PM      Reason for Disposition    [1] Taking antibiotic > 72 hours (3 days) and [2] pain persists or recurs    Additional Information    Negative: [1] Stiff neck (unable to touch chin to chest) AND [2] fever    Negative: [1] Bony area of skull behind the ear is pink or swollen AND [2] fever    Negative: Fever > 104 F (40 C)    Negative: Patient sounds very sick or weak to the triager    Negative: [1] SEVERE pain and [2] not improved 2 hours after analgesic medication (e.g., ibuprofen or acetaminophen)    Negative: Walking is very unsteady or dizziness    Negative: [1] Vomited AND [2] 3 or more times    Protocols used: EAR - OTITIS MEDIA FOLLOW-UP CALL-A-

## 2021-06-12 NOTE — PROGRESS NOTES
Subjective: Tara is a 27-year-old lady who is here following up for an ER visit.  She was seen yesterday in the emergency room for complaints of abdominal pain and nausea.  She gives me a history of having had an ingrown toenail.  She was seen I think by podiatry and had this partially removed.  She started getting a red streak going up her leg from her toe and was placed on doxycycline for cellulitis.  From the doxycycline it sounds like she got a vaginal infection which was diagnosed as bacterial vaginosis.  She was placed on metronidazole for that.  She then reports that she started getting stomach pain.  From my history it sounds like the pain started definitely after she started these oral agents.  She does not make as clear-cut a connection between these 2.  She does know that she started having stomach pain she indicates midepigastric region but also the right upper quadrant.  In reviewing her chart she has had abdominal pain and bloating before she had an abdominal ultrasound done in April 2017 which was totally normal.  She describes nausea after she eats.  She denies reflux.  She denies vomiting.  No fevers have been noted.  Bowel movements have just started to become slightly looser.  He feels that the red streak going up her leg is actually better.  Her other concern is that 3 out of her 4 children and her  have all been diagnosed with strep the other child who actually had symptoms of the sore throat was negative and she was negative.  She is still worried about strep throat as well.  She is also developed a cough.  She feels like cough is productive.  No shortness of breath noted.  No wheezing noted.  She also notes the onset of mild dysuria just today.  Patient Active Problem List   Diagnosis     Methamphetamine Abuse - In Remission     Pain During Urination (Dysuria)     Metrorrhagia     Abdominal Pain     Bipolar Disorder     Headache     Worsening Vision Started Suddenly     Classic  Migraine (With Aura) With Intractable Migraine     Acute Gastritis     Pelvic Pain     Carrier of group B Streptococcus     Normal delivery     GBS carrier     Lactating mother     Abnormal imaging of thyroid     Paresthesia     Adjustment disorder with mixed anxiety and depressed mood     Generalized anxiety disorder     ADD (attention deficit disorder)     Current Outpatient Prescriptions on File Prior to Visit   Medication Sig Dispense Refill     acetaminophen (TYLENOL) 500 MG tablet Take 1,000 mg by mouth every 6 (six) hours as needed for pain.       b complex vitamins tablet Take 1 tablet by mouth daily. 30 tablet 12     cyclobenzaprine (FLEXERIL) 10 MG tablet Take 0.5-1 tablets (5-10 mg total) by mouth 3 (three) times a day as needed (Severe pain/spasm.). 15 tablet 0     dextroamphetamine-amphetamine (ADDERALL XR) 30 MG 24 hr capsule Take 1 capsule (30 mg total) by mouth every morning. 30 capsule 0     doxycycline (VIBRAMYCIN) 100 MG capsule Take 1 capsule (100 mg total) by mouth 2 (two) times a day for 7 days. 14 capsule 0     ergocalciferol (ERGOCALCIFEROL) 50,000 unit capsule 1 every SUNday 12 capsule 3     fluconazole (DIFLUCAN) 150 MG tablet TAKE ONE TABLET BY MOUTH ONCE FOR 1 DOSE. MAY REPEAT IN 1 WEEK. 2 tablet 0     folic acid (FOLVITE) 1 MG tablet TAKE 1 TABLET BY MOUTH DAILY 90 tablet 1     ginger, Zingiber officinalis, 250 mg cap Take 250 mg by mouth every 6 (six) hours. As needed for nausea 30 capsule 1     ibuprofen (ADVIL,MOTRIN) 600 MG tablet Take 1 tablet (600 mg total) by mouth every 6 (six) hours as needed for pain. 30 tablet 0     Lactobac. rhamnosus GG-inulin (CULTURELLE) 10 billion cell -200 mg CpSP Take 1 capsule by mouth daily. 1 daily 30 capsule 12     magnesium oxide 400 mg cap 1 daily 90 each 3     melatonin 5 mg cap 2 TABLETS AT BEDTIME 60 each 12     metroNIDAZOLE (FLAGYL) 500 MG tablet Take 1 tablet (500 mg total) by mouth 2 (two) times a day for 7 days. No alcohol while on this  medication. 14 tablet 0     METRONIDAZOLE ORAL Take by mouth.       omega-3 fatty acids-vitamin E (FISH OIL) 1,000 mg cap 2 Capsules Daily 60 each 12     oxyCODONE-acetaminophen (PERCOCET) 5-325 mg per tablet Take 1-2 tablets by mouth every 4 (four) hours as needed for pain. Of migraine 12 tablet 0     PRENATAL VITAMIN Tab TAKE 1 TABLET BY MOUTH DAILY 100 each 3     pyridoxine, vitamin B6, (VITAMIN B-6) 25 MG tablet Take 1 tablet (25 mg total) by mouth every 6 (six) hours. As needed for nausea 30 tablet 1     QUEtiapine (SEROQUEL) 50 MG tablet 50  To 150 mg at bedtime 180 tablet 1     rifAXIMin (XIFAXAN) 550 mg Tab tablet Take 1 tablet (550 mg total) by mouth 3 (three) times a day. 21 tablet 0     UNABLE TO FIND Med Name: Nolan barillas herbal tablets daily       No current facility-administered medications on file prior to visit.      Social History     Social History     Marital status: Single     Spouse name: N/A     Number of children: N/A     Years of education: N/A     Occupational History     Not on file.     Social History Main Topics     Smoking status: Light Tobacco Smoker     Packs/day: 0.25     Types: Cigarettes     Smokeless tobacco: Never Used      Comment: 1-2 cigarettes day     Alcohol use No      Comment: occasional     Drug use: Yes     Special: Marijuana      Comment: medical marijuana-migraine HAs     Sexual activity: Yes     Partners: Female, Male     Other Topics Concern     Not on file     Social History Narrative    04/05/15 - The patient is currently employed.      family history includes Migraines in her maternal grandmother, mother, and sister; Spina bifida in her maternal aunt and niece.      Objective: Vital signs: Blood pressure 98/62 temp is 98.1 height is 5 feet 3 weight is 105 this young lady is alert she does not appear in any acute distress.  HEENT exam throat posterior pharynx mildly red no exudate or adenopathy she has no cervical adenopathy anteriorly or posteriorly.  Lungs she  has coarse and rhonchi which are scattered throughout.  These largely clear but not completely clear upon coughing.  No wheezes noted.  No fine rales.  No areas of dullness or consolidation.  Heart regular rate and rhythm S1-S2 normal without murmurs or ectopy abdomen is soft bowel sounds are present and normal in all 4 quadrants no hepatomegaly or splenomegaly appreciated no abnormal masses appreciated.  She is mildly tender in the midepigastric region but also in the mid lower abdomen.  She is also mildly tender in the right upper quadrant.  Negative Gaona sign over the gallbladder.  No rebound referred pain or guarding noted.  Back no CVA tenderness noted.  Rapid strep is taken and pending.  Other labs drawn heme 1, lipase, BMP.  UA done as well.    Assessment: #1 abdominal pain and nausea.  I think that it would make sense that she got treated with both doxycycline and metronidazole both of which are difficult on the stomach and got secondary stomach pain and nausea from these.  However she does have history of abdominal pain and this is a definite change for her.  Knowing that we probably need to make sure that there is nothing else going on.  In addition to the lab work will also order an ultrasound of the abdomen.  2.  What looks like bronchitis.  3.  Concern over strep throat  4.  Dysuria    Plan: As noted labs drawn and ultrasound ordered  2.  Rapid strep done  3.  Will have her DC the doxycycline and start a azithromycin instead definitely to take this with food.  4.  UA with reflex done we will inform her of these results when available

## 2021-06-12 NOTE — PROGRESS NOTES
ASSESSMENT/PLAN:   1. BV (bacterial vaginosis)  metroNIDAZOLE (FLAGYL) 500 MG tablet   2. Dysuria  Urinalysis-UC if Indicated    Wet Prep, Vaginal    Chlamydia trachomatis & Neisseria gonorrhoeae, Amplified Detection   3. Screening  Pregnancy (Beta-hCG, Qual), Urine    Chlamydia trachomatis & Neisseria gonorrhoeae, Amplified Detection    Syphilis Screen, [place]    HIV Antigen/Antibody Screening [place]    Hepatitis Acute Evaluation   4. Strep throat exposure  Rapid Strep A Screen-Throat    Group A Strep, RNA Direct Detection, Throat     No signs of UTI on UA. I suspect dysuria is due to BV - wet prep positive. We will treat with metronidazole. Discussed safe use of this medication.   Remainder of STI testing is pending. No specific exposures thus no indication for empiric treatment.  Patient's epigastric abdominal pain has been chronic for several months. She has already had an abdominal ultrasound, which was negative. Do not feel further workup is indicated today as it is not acutely worse than usual and there is no acute abdomen on exam, no fevers to suggest acute cholecystitis or other intra-abdominal infection.  RST was done due to all her children having positive Strep tests in clinic today. Her test was negative. No evidence for tonsillitis, peritonsillar or retropharyngeal abscess, and her sore throat is mild. Recommended supportive cares. We will follow up on overnight test for Strep, treating if indicated.  I educated the patient on warning signs for immediate follow up including fevers/chills, nausea, vomiting, hematuria, worsening abdominal pain.  I educated the patient to otherwise follow up with primary care doctor within 1 week for further concerns. Patient understood and agreed. Patient was stable for discharge.      Patient Instructions:  Patient Instructions   Your urine pregnancy test is negative today.  Your urine test did not show any signs of urinary tract infection.    Your strep throat test  "was negative.    Your wet prep showed that you have bacterial vaginosis.  Please take metronidazole twice daily for 7 days.  No alcohol while taking this medication.  Take with food.  Take a probiotic while on the antibiotic.    We will call with results of STD testing if positive and we will initiate treatment if needed. If all testing comes back negative, we will send results to [facility].    We are testing for:  -HIV  -Gonorrhea  -Chlamydia  -Syphillis  -Hepatitis    Please follow up with your primary care provider in 1 week to discuss abdominal pain and other concerns.    Go to ER if developing sudden severe abdominal or pelvic pain, heavy vaginal bleeding, vomiting, fever, or any other new concerning symptoms.                    SUBJECTIVE:   Tara Sorenson is a 27 y.o. female with a history including migraine headaches, gastritis, who presents today for evaluation of burning with urination x 2 weeks. She admits to urinary frequency and burning with urination. No hematuria. She admits to \"a lot of vaginal discharge.\" It is thin, clear, not particularly malodorous.  No new sexual partners. She is sexually active with 1 female- her fiance. She did have intercourse with a \"donor\" on 8/3/17. She denies any known specific exposures.  No genital sores. No fever. She admits to epigastric abdominal pain for months. Abdominal pain is located in the epigastric region. She admits to daily nausea also for months, but no vomiting. No diarrhea. She admits to stool changes. All of this has been chronic for months. She admits to headache, but she gets migraines.   She feels like since she had a D&C for a miscarriage and endometritis, she has always had issues. She has been trying to conceive since then. She says her periods have been \"different\" since then- heavier and passing more clots. LMP started 8/19/17 and finished yesterday.       Past Medical History:  Patient Active Problem List   Diagnosis     Methamphetamine " Abuse - In Remission     Pain During Urination (Dysuria)     Metrorrhagia     Abdominal Pain     Bipolar Disorder     Headache     Worsening Vision Started Suddenly     Classic Migraine (With Aura) With Intractable Migraine     Acute Gastritis     Pelvic Pain     Carrier of group B Streptococcus     Normal delivery     GBS carrier     Lactating mother     Abnormal imaging of thyroid     Paresthesia     Adjustment disorder with mixed anxiety and depressed mood     Generalized anxiety disorder     ADD (attention deficit disorder)       Surgical History:  D&C on 9/11/16    Family History:  Family History   Problem Relation Age of Onset     Migraines Mother      Migraines Sister      Spina bifida Maternal Aunt      Migraines Maternal Grandmother      Spina bifida Niece      Reviewed; Non-contributory      Social History:    History   Smoking Status     Light Tobacco Smoker     Packs/day: 0.25     Types: Cigarettes   Smokeless Tobacco     Never Used     Comment: 1-2 cigarettes day     Smoking: yes  Alcohol use: none  Other drug use: medical marijuana  Occupation: homemaker        Current Medications:  Current Outpatient Prescriptions on File Prior to Visit   Medication Sig Dispense Refill     acetaminophen (TYLENOL) 500 MG tablet Take 1,000 mg by mouth every 6 (six) hours as needed for pain.       b complex vitamins tablet Take 1 tablet by mouth daily. 30 tablet 12     cyclobenzaprine (FLEXERIL) 10 MG tablet Take 0.5-1 tablets (5-10 mg total) by mouth 3 (three) times a day as needed (Severe pain/spasm.). 15 tablet 0     dextroamphetamine-amphetamine (ADDERALL XR) 30 MG 24 hr capsule Take 1 capsule (30 mg total) by mouth every morning. 30 capsule 0     doxycycline (VIBRAMYCIN) 100 MG capsule Take 1 capsule (100 mg total) by mouth 2 (two) times a day for 7 days. 14 capsule 0     ergocalciferol (ERGOCALCIFEROL) 50,000 unit capsule 1 every SUNday 12 capsule 3     fluconazole (DIFLUCAN) 150 MG tablet TAKE ONE TABLET BY MOUTH  ONCE FOR 1 DOSE. MAY REPEAT IN 1 WEEK. 2 tablet 0     folic acid (FOLVITE) 1 MG tablet TAKE 1 TABLET BY MOUTH DAILY 90 tablet 1     ginger, Zingiber officinalis, 250 mg cap Take 250 mg by mouth every 6 (six) hours. As needed for nausea 30 capsule 1     ibuprofen (ADVIL,MOTRIN) 600 MG tablet Take 1 tablet (600 mg total) by mouth every 6 (six) hours as needed for pain. 30 tablet 0     Lactobac. rhamnosus GG-inulin (CULTURELLE) 10 billion cell -200 mg CpSP Take 1 capsule by mouth daily. 1 daily 30 capsule 12     magnesium oxide 400 mg cap 1 daily 90 each 3     melatonin 5 mg cap 2 TABLETS AT BEDTIME 60 each 12     METRONIDAZOLE ORAL Take by mouth.       omega-3 fatty acids-vitamin E (FISH OIL) 1,000 mg cap 2 Capsules Daily 60 each 12     oxyCODONE-acetaminophen (PERCOCET) 5-325 mg per tablet Take 1-2 tablets by mouth every 4 (four) hours as needed for pain. Of migraine 12 tablet 0     PRENATAL VITAMIN Tab TAKE 1 TABLET BY MOUTH DAILY 100 each 3     pyridoxine, vitamin B6, (VITAMIN B-6) 25 MG tablet Take 1 tablet (25 mg total) by mouth every 6 (six) hours. As needed for nausea 30 tablet 1     QUEtiapine (SEROQUEL) 50 MG tablet 50  To 150 mg at bedtime 180 tablet 1     rifAXIMin (XIFAXAN) 550 mg Tab tablet Take 1 tablet (550 mg total) by mouth 3 (three) times a day. 21 tablet 0     UNABLE TO FIND Med Name: Nolan barillas herbal tablets daily       [DISCONTINUED] metroNIDAZOLE (FLAGYL) 500 MG tablet TAKE ONE TABLET BY MOUTH THREE TIMES DAILY FOR 7 DAYS. 21 tablet 0     Current Facility-Administered Medications on File Prior to Visit   Medication Dose Route Frequency Provider Last Rate Last Dose     [COMPLETED] sodium chloride 0.9% 1,000 mL  1,000 mL Intravenous Once Joshua Fry CNP   Stopped at 08/27/17 4870       Allergies:   Allergies   Allergen Reactions     Benadryl [Diphenhydramine Hcl] Unknown     had previously tolerated     Compazine [Prochlorperazine] Other (See Comments)     Anxiety      Metoclopramide Hcl  "Other (See Comments) and Unknown     \"It makes me feel like jumping out of my skin.\"  \"wanted to jump out of my skin\"       I personally reviewed patient's past medical, surgical, social, family history and allergies.    ROS:  Review of Systems  An 8point review of systems was conducted and otherwise negative unless noted in HPI.          OBJECTIVE:   Pulse 64  Temp 98.3  F (36.8  C) (Oral)   Resp 18  Wt 105 lb 9.6 oz (47.9 kg)  LMP 08/19/2017  SpO2 100%  BMI 18.71 kg/m2      General Appearance:  Alert, well-appearing female in NAD. Afebrile.   Integument: Warm, dry.  HEENT: Moist mucus membranes. Mild posterior pharyngeal erythema. No exudates. No tonsillar hypertrophy.  Respiratory: No distress. Lungs clear to ausculation bilaterally. No crackles, wheezes, rhonchi or stridor.  Cardiovascular: Regular rate and rhythm, no murmur, rub or gallop. No obvious chest wall deformities. Peripheral pulses 2+ bilaterally. No peripheral edema.  GI: Soft. Very mild epigastric tenderness; no rebounding or guarding. Normal bowel sounds. No masses, organomegaly, rigidity, or guarding.  GENITALIA: Normal female external genitalia.  No rashes or lesions. Scant thin discharge in the vaginal vault. No blood in vaginal vault. Cervical os is closed. No cervical lesions. No cervical motion tenderness. No adnexal tenderness on bimanual exam.    Neurologic: Alert and orientated appropriately. No focal deficits. Follows commands.           Laboratory Studies:  I personally ordered and interpreted these studies.    Results for orders placed or performed in visit on 08/28/17   Wet Prep, Vaginal   Result Value Ref Range    Yeast Result No yeast seen No yeast seen    Trichomonas No Trichomonas seen No Trichomonas seen    Clue Cells, Wet Prep Clue cells seen (!) No Clue cells seen   Rapid Strep A Screen-Throat   Result Value Ref Range    Rapid Strep A Antigen No Group A Strep detected, presumptive negative No Group A Strep detected, " presumptive negative   Urinalysis-UC if Indicated   Result Value Ref Range    Color, UA Yellow Colorless, Yellow, Straw, Light Yellow    Clarity, UA Clear Clear    Glucose, UA Negative Negative    Bilirubin, UA Negative Negative    Ketones, UA Negative Negative    Specific Gravity, UA 1.025 1.005 - 1.030    Blood, UA Trace (!) Negative    pH, UA 6.0 5.0 - 8.0    Protein, UA 30 mg/dL (!) Negative mg/dL    Urobilinogen, UA 0.2 E.U./dL 0.2 E.U./dL, 1.0 E.U./dL    Nitrite, UA Negative Negative    Leukocytes, UA Negative Negative   Pregnancy (Beta-hCG, Qual), Urine   Result Value Ref Range    Pregnancy Test, Urine Negative Negative    Specific Gravity, UA 1.025 1.001 - 1.030

## 2021-06-12 NOTE — TELEPHONE ENCOUNTER
Reviewed [facility] note. Need office visit per [facility] provider-follow up if not improved. The patient has no alternate caregivers for the kids and would have to bring the kids to the clinic.     Can MD either authorize a phone visit or send alternate medication if appropriate? Thank you.

## 2021-06-12 NOTE — TELEPHONE ENCOUNTER
I am not understanding this   What side effects is Tara referring to?    She should have an evaluation at Urgent Care to assess ears     DanL
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
## 2021-06-12 NOTE — TELEPHONE ENCOUNTER
Medication Question or Clarification  Who is calling: patient  What medication are you calling about (include dose and sig)?: Topamax 25 mg daily  Who prescribed the medication?: historic  What is your question/concern?: Patient is out of medication and asking if Dr Dumont can fill this today?  It is historic. Verbose and talking rapidly. Hard for her to stop talking and listen today.   Requested [facility]acy: [facility]/21/[facility]  Okay to leave a detailed message?: Yes

## 2021-06-13 NOTE — PROGRESS NOTES
Patient no show for follow up with this provider on 10/11/2017 at 5:00 PM.  The undersigned contacted patient at number on file in Epic, [phone].  Patient answered and stated she cancelled this appointment yesterday on My Chart and made a call to what she thought was the call center.  Discussed rescheduling as she is interested in returning for follow up.  Patient rescheduled for Tuesday, 10/17/17 at 5PM.    FADIA Elaine

## 2021-06-13 NOTE — PROGRESS NOTES
Family Medicine Telephone Visit Note         HPI   Patients name: Tara  Appointment start time:  9:07 AM     HPI: Patient is requesting a refill of an antibiotic due to your concerns and other refills.    Patient was diagnosed with an otitis media at a walk-in clinic about a month ago.  Patient took her full course of amoxicillin with mild improvement.  Patient still continued to notice pain.  Patient was prescribed cefdinir over the phone for ongoing pain.  Patient noted significant abdominal pain and nausea with this.  She stopped taking it.  Patient was seen at a urgent care yesterday for ongoing pain.  Her ear appeared well on exam per chart review.  She was provided nasal sprays and of decongestion.  Patient does not nasal sprays and has not tried them at this time.  Patient is requesting a amoxicillin refill.    Patient is having vaginal irritation consistent with prior bacterial vaginosis.  Patient notes her close may have been washed in a new detergent.  Upon chart review patient has multiple blood preps in the past positive for clue cells.  Patient notes drastic improvement with metronidazole oral.  Patient does not like vaginal preparations.    Patient is requesting a refill of her migraine medication rizatriptan.  She normally gets both dissolving and oral tablets.    Current Outpatient Medications   Medication Sig Dispense Refill     lamoTRIgine (LAMICTAL) 200 MG tablet Take 1 tablet (200 mg total) by mouth daily. 30 tablet 11     magnesium oxide (MAG-OX) 400 mg (241.3 mg magnesium) tablet TAKE 1 TABLET BY MOUTH DAILY 90 tablet 0     metroNIDAZOLE (FLAGYL) 500 MG tablet TAKE 1 TABLET BY MOUTH TWICE DAILY FOR 7 DAYS 14 tablet 0     riboflavin, vitamin B2, 100 mg Tab 2 tablets daily for migraine prophylaxis 60 tablet 12     rizatriptan (MAXALT-MLT) 10 MG disintegrating tablet TAKE 1 TABLET BY MOUTH AS NEEDED FOR MIGRAINE. MAY REPEAT IN 2 HOURS IF NEEDED 9 tablet 2     acetaminophen (TYLENOL) 500 MG  "tablet Take 1,000 mg by mouth every 6 (six) hours as needed for pain.       ALPRAZolam (XANAX) 0.5 MG tablet Take 1 tablet (0.5 mg total) by mouth every 12 (twelve) hours as needed for anxiety (for panic). 20 tablet 0     fluticasone propionate (FLONASE) 50 mcg/actuation nasal spray 2 sprays into each nostril daily. 16 g 1     ibuprofen (ADVIL,MOTRIN) 200 MG tablet Take 600 mg by mouth every 8 (eight) hours as needed for pain.       pseudoephedrine (SUDAFED) 120 mg 12 hr tablet Take 1 tablet (120 mg total) by mouth 2 (two) times a day as needed for congestion. 20 tablet 2     pseudoephedrine (SUDAFED) 60 MG tablet Take 1 tablet (60 mg total) by mouth every 6 (six) hours as needed for congestion. 30 tablet 0     rizatriptan (MAXALT) 10 MG tablet Take 1 tablet (10 mg total) by mouth as needed for migraine. May repeat in 2 hours if needed 9 tablet 2     topiramate (TOPAMAX) 25 MG tablet Take 1 tablet (25 mg total) by mouth at bedtime. 90 tablet 0     UNABLE TO FIND Med Name: Medical canabis       No current facility-administered medications for this visit.      Allergies   Allergen Reactions     Haloperidol Anxiety     Felt anxious at 20hrs post single 2mg IV dose of haloperidol lactate      Compazine [Prochlorperazine] Other (See Comments)     Anxiety      Metoclopramide Hcl Other (See Comments) and Unknown     \"It makes me feel like jumping out of my skin.\"  \"wanted to jump out of my skin\"              Review of Systems:     Pertinent items are noted in HPI.         Physical Exam:     [facility] 04/05/2019   Estimated body mass index is 17.75 kg/m  as calculated from the following:    Height as of 1/19/20: 5' 3.25\" (1.607 m).    Weight as of 2/10/20: 101 lb (45.8 kg).    Exam:  Constitutional: Pleasant and engaged, no respiratory distress  Psychiatric: Normal affect and mood        Assessment and Plan   Tara was seen today for medication check and medication refill.    Diagnoses and all orders for this visit:    Exposure " to COVID-19 virus: Patient requesting Covid test due to recent exposure and headache, stuffy nose, and body aches she is experiencing  -     Symptomatic COVID-19 Virus (CORONAVIRUS) PCR; Future    Acute vaginitis: Per chart review patient has a longstanding history of bacterial vaginitis.  Provided a course of metronidazole.  Encourage patient to follow-up for wet prep if symptoms not improving  -     metroNIDAZOLE (FLAGYL) 500 MG tablet; TAKE 1 TABLET BY MOUTH TWICE DAILY FOR 7 DAYS    Migraine without aura and with status migrainosus, not intractable: Provided refill  -     rizatriptan (MAXALT-MLT) 10 MG disintegrating tablet; TAKE 1 TABLET BY MOUTH AS NEEDED FOR MIGRAINE. MAY REPEAT IN 2 HOURS IF NEEDED  -     rizatriptan (MAXALT) 10 MG tablet; Take 1 tablet (10 mg total) by mouth as needed for migraine. May repeat in 2 hours if needed    Dysfunction of left eustachian tube: Patient seen yesterday he had ear exam clearly documents no present infection.  Discussed the diagnosis of eustachian tube dysfunction with the patient.  Explained the need for decongestions such as Afrin and pseudoephedrine or nasal steroids.  Patient does not like nasal sprays.  Discussed that she could take a pseudoephedrine twice daily for 10 days to help with clearing the eustachian tubes and middle ear.  This may help relieve her symptoms and prevent an ear infection in the future.  Patient in agreement with this plan  -     pseudoephedrine (SUDAFED) 120 mg 12 hr tablet; Take 1 tablet (120 mg total) by mouth 2 (two) times a day as needed for congestion.      After Visit Information:  Thomas.    Appointment end time: 9:29 AM  This is a telephone visit that took 22 minutes.    Clinician location:  [facility]iders Home     Jayla Lugo DO
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
## 2021-06-13 NOTE — PROGRESS NOTES
Mental Health Visit Note    10/17/17    Start time: 5:02 PM    Stop Time: 6:01 PM   Session # 2    Tara Sorenson is a 27 y.o. female is being seen today for    Chief Complaint   Patient presents with     Follow-up   .     New symptoms or complaints: Patient is re-engaging in therapy since 6/8/17. Patient completed PHQ-9, AURELIO-7 and PANSI inventories which are recorded in today's Treatment Plan and will be sent to scanning into her electronic chart. Making day care arrangements for her children has been an obstacle to attending therapy appointments.      Functional Impairment:   Personal: 4  Family: 3  Work: 4  Social:3    Clinical assessment of mental status: The patient was on time for her scheduled session. The patient was pleasant and cooperative. She was oriented ×3. She made appropriate eye contact. She was adequately dressed with good grooming and hygiene. Recent memory appeared intact. Concentration and focus is within normal. Speech: within normal. Mood and affect were dysphoric and labile. Fund of knowledge was adequate. Thought process was within normal limits. Insight and judgment, adequate.    Suicidal/Homicidal Ideation present: None Reported This Session    Patient's impression of their current status: Tara identified increased stress and anxiety levels in the context of relationship issues.  She identified unhelpful thought patterns and considered the impact on communication patterns with her partner, notably a tendency to react in anger outbursts toward her, something that she regrets.  She discussed a tendency to engage in OCD-like behavior as a coping strategy and considered how it is not self destructive, nor destructive toward others.  She has organized her son's Legos, but has to show restraint on how often she rearranges room furniture.  She  reflected on the values that are most meaningful to her (parenting her children, family, health) and the behaviors that correspond with them.       Therapist impression of patients current state: Patient is engaged in the therapeutic process.  Her thoughts, feelings, and behaviors were processed and coping techniques explored.      Type of psychotherapeutic technique provided: Insight oriented, Client centered, Solution-focused and CBT    Progress toward short term goals:Progress as expected, therapeutic relationship building in progress. Patient receptive to strategies for increased coping in the presence of stressors.    Review of long term goals: Completed Treatment Plan.     Diagnosis:   1. Generalized anxiety disorder        Plan and Follow up: Encouraged patient to nurture a mindfulness breathing practice (provided diaphragmatic breathing hand out in session, recommended twice/day, 5-10 minutes) , and notice 'unhelpful' thoughts and commit to value-based behaviors in the presence of difficult thoughts and feelings. Plans to practice assertive communication skills discussed in session today.  Encouraged patient to return for follow up regularly, and she agreed to schedule follow up next week - she indicated that her partner can watch her children.  Plans to meet with her PCP and to discuss medications.      Discharge Criteria/Planning: Patient will continue with follow-up until therapy can be discontinued without return of signs and symptoms.    Lilian Lozano 10/17/2017      This note was created with help of Dragon dictation software. Grammatical / typing errors are not intentional and inherent to the software.

## 2021-06-13 NOTE — PROGRESS NOTES
"Tara Sorenson is a 30 y.o. female who is being evaluated via a billable telephone visit.      The patient has been notified of following:     \"This telephone visit will be conducted via a call between you and your physician/provider. We have found that certain health care needs can be provided without the need for a physical exam.  This service lets us provide the care you need with a short phone conversation.  If a prescription is necessary we can send it directly to your pharmacy.  If lab work is needed we can place an order for that and you can then stop by our lab to have the test done at a later time.    Telephone visits are billed at different rates depending on your insurance coverage. During this emergency period, for some insurers they may be billed the same as an in-person visit.  Please reach out to your insurance provider with any questions.    If during the course of the call the physician/provider feels a telephone visit is not appropriate, you will not be charged for this service.\"    Patient has given verbal consent to a Telephone visit? Yes    What phone number would you like to be contacted at? [phone]    Patient would like to receive their AVS by AVS Preference: E-Mail (Inform patient AVS not encrypted with this option).    Anette Arndt [facility]    "

## 2021-06-13 NOTE — PROGRESS NOTES
Outpatient Mental Health Treatment Plan    Name:  Tara Sorenson  :  1990  MRN:  [id number]    Treatment Plan:  Initial Treatment Plan  Intake/initial treatment plan date:  10/17/17  Benefit and risks and alternatives have been discussed: Yes  Is this treatment appropriate with minimal intrusion/restrictions: Yes  Estimated duration of treatment:  Approximately 8-12 sessions.  Anticipated frequency of services:  Every 1-2 weeks  Necessity for frequency: This frequency is needed to establish therapeutic goals and for continuity of care in order to monitor progress.  Necessity for treatment: To address cognitive, behavioral, and/or emotional barriers in order to work toward goals and to improve quality of life.    Plan:       ? Other: Bipolar Disorder I most recent episode mixed   Goal: Reduce symptoms of depression and cammie (see goals for depression below)  Strategies   [x] Reduce risk taking behavior  [x] increase physical activity, i.e. Daily exercise  ?   [x] take medications as prescribed and follow up with PCP, referral to outpatient psychiatry  [x]  Maintain functioning in community  [x] alleviate acute mood symptoms  [x] Maintain good sleep habit.  ?  ?Degree to which this is a problem: 3  Degree to which goal is met: goal in progress  Date of Review: in 3 months      ? Depression    Goal:  Decrease average depression level from 3/4 to 2/4.  Strategies:    ?[x] Decrease social isolation  [x] Increase involvement in meaningful activities  ?[x] Discuss sleep hygiene  ?[x] Explore thoughts and expectations about self and others  ?[x] Process grief (loss of significant person, independence, role, etc.)  ?[x] Assess for suicide risk  ?[x] Implement physical activity routine (with physician approval)  [x] Consider introduction of bibliotherapy and/or videos  [x] Continue compliance with medical treatment plan (or explore barriers)  ?  Degree to which this is a problem: 3  Degree to which goal is  "met: goal in progress.   Date of Review: in 3 months.  ?   ? Anxiety    Goal:  Decrease average anxiety level from 4/4 to 2/4.  Strategies: ?   [x]Learn and practice relaxation techniques and other coping strategies (e.g., thought stopping, reframing, meditation)  ? [x] Increase involvement in meaningful activities  ? [x] Discuss sleep hygiene  ? [x] Explore thoughts and expectations about self and others  ? [x] Identify and monitor triggers for panic/anxiety symptoms  ? [x] Implement physical activity routine (with physician approval)  ? [x] Consider introduction of bibliotherapy and/or videos  ? [x] Continue compliance with medical treatment plan (or explore barriers)   [x]       Degree to which this is a problem: 4  Degree to which goal is met: goal in progress  Date of Review: 3 months.                           Functional Impairment:  1=Not at all/Rarely  2=Some days  3=Most Days  4=Every Day    Personal : 4  Family : 3  Social : 3   Work/school : 4    Diagnosis:    Bipolar I Disorder, Generalized Anxiety Disorder    WHODAS 2.0 12-item version 7      Scores presented in qualifiers to represent level of disability.  MILD Problem (slight, low, ...) 5-24%    H1= 20  H2= 5  H3= 15    Clinical assessments and measures completed:. AURELIO-7, PHQ-9, CAGE-AID and PANSI   PHQ-9: 16, AURELIO-7: 19, PANSI: Positive 2.83<3.4, Negative 1>1.6     Strengths: Skilled , \"I'm magical with babies, I love being a parent\", responsible.  Limitations:  \"Trust issues\", decision making in relationships, difficulty saying no to others, distractible.  Cultural Considerations: Patient grew up in [place] in an urban neighborhood of lower SES.  Her father left the family when she was 3 years old, and did not return until she as 13.  Her father struggled with chemical health problems his whole life.       Persons responsible for this plan: Patient            Psychotherapist Signature           Patient Signature:              Guardian " Signature             Provider: Performed and documented by NUVIA Eliane   Date:  10/17/2017      This note was created with help of Dragon dictation software.  Grammatical / typing errors are not intentional and inherent to the software.

## 2021-06-14 NOTE — PROGRESS NOTES
Pt briefly spoke to provider and as per [facility] Decker directives put in note NOT TO RESCHEDULE WITH THIS PROVIDER

## 2021-06-14 NOTE — TELEPHONE ENCOUNTER
RN cannot approve Refill Request    RN can NOT refill this medication Protocol failed and NO refill given. Last office visit: 12/6/2019 Joshua Dumont MD Last Physical: 4/8/2019 Last MTM visit: Visit date not found Last visit same specialty: 12/6/2019 Joshua Dumont MD.  Next visit within 3 mo: Visit date not found  Next physical within 3 mo: Visit date not found      Phyllis Hamilton, [facility] Triage/Med Refill 12/31/2020    Requested Prescriptions   Pending Prescriptions Disp Refills     magnesium oxide (MAG-OX) 400 mg (241.3 mg magnesium) tablet [Pharmacy Med Name: MAG-OXIDE 400MG TABLETS] 90 tablet 0     Sig: TAKE 1 TABLET BY MOUTH DAILY       There is no refill protocol information for this order

## 2021-06-14 NOTE — PROGRESS NOTES
This video/telephone visit will be conducted via a call between you and your physician/provider. We have found that certain health care needs can be provided without the need for an in-person physical exam. This service lets us provide the care you need with a video /telephone conversation. If a prescription is necessary we can send it directly to your pharmacy. If lab work is needed we can place an order for that and you can then stop by our lab to have the test done at a later time.    Just as we bill insurance for in-person visits, we also bill insurance for video/telephone visits. If you have questions about your insurance coverage, we recommend that you speak with your insurance company.    Patient has given verbal consent for video/Telephone visit? yes  Patient would like the video visit invitation sent by: Text to cell phone: [phone]  Send to email: LUCINDA or SIP CALL to:  LUCINDA RUSHING/DIMITRIS : Jarred DEE LPN  Pt is a transfer from [place] and is asking to refill Alprazolam.  Patient verified allergies, medications and pharmacy via phone. PHQ : 17 and AURELIO: 18, Mood current: 4 done verbally with writer. Patient states she  is ready for visit.    [facility]    Javascript must be enabled to view this report.  Tara Sorenson, 30F  Powered by     Tara sorenson    Summary  Rx Data    Fill Date ID Written Sold Drug Qty Days Prescriber Rx # Pharmacy Refill Daily Dose * Pymt Type    11/24/2020 1 11/24/2020 11/25/2020 [facility] 120 Mg Caplet  20.00 10 [facility] Dep [id number] [id number] 0/2  [place]   11/20/2020 1 11/20/2020 11/25/2020 Pseudoephedrine 30 Mg Tablet  60.00 7 Bruna Kva [id number] [facility][id number][facility] 0/[place]     ________________________________________  Medications Phoned  to Pharmacy [] yes []no  Name of Pharmacist:  List Medications, including dose, quantity and instructions    Medications ordered this visit were e-scribed.  Verified by order class [] yes  [] no    Medication changes or discontinuations were  communicated to patient's pharmacy: [] yes  [] no    Dictation completed at time of chart check: [] yes  [] no    I have checked the documentation for today s encounters and the above information has been reviewed and completed.
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
## 2021-06-14 NOTE — TELEPHONE ENCOUNTER
Refill Approved    Rx renewed per Medication Renewal Policy. Medication was last renewed on 20.    Leydi Diaz, [facility] Connection Triage/Med Refill 2/3/2021     Requested Prescriptions   Pending Prescriptions Disp Refills     fluticasone propionate (FLONASE) 50 mcg/actuation nasal spray 16 g 1     Si sprays into each nostril daily.       Nasal Steroid Refill Protocol Passed - 2021  3:38 PM        Passed - Patient has had office visit/physical in last 2 years     Last office visit with prescriber/PCP: 2019 OR same dept: Visit date not found OR same specialty: 2019 Joshua Dumont MD Last physical: 2019 Last MTM visit: Visit date not found    Next appt within 3 mo: Visit date not found  Next physical within 3 mo: Visit date not found  Prescriber OR PCP: Joshua Dumont MD  Last diagnosis associated with med order: 1. Nasal congestion  - fluticasone propionate (FLONASE) 50 mcg/actuation nasal spray; 2 sprays into each nostril daily.  Dispense: 16 g; Refill: 1     If protocol passes may refill for 12 months if within 3 months of last provider visit (or a total of 15 months).

## 2021-06-15 NOTE — PROGRESS NOTES
Clinic Care Coordination Contact        Proactive outreach to support patient to establish care with new PCP clinic due to legacy clinic closer  Chart review notes patient engagement with legacy provider at new location  No further outreach at this time

## 2021-06-15 NOTE — PROGRESS NOTES
ASSESSMENT & PLAN    No problem-specific Assessment & Plan notes found for this encounter.      Tara was seen today for itchiness.    Diagnoses and all orders for this visit:    Yeast infection of the vagina  -     fluconazole (DIFLUCAN) 150 MG tablet; Take 1 tablet (150 mg total) by mouth daily for 7 doses.    Other stimulant abuse, uncomplicated (H)    MDD (major depressive disorder), recurrent severe, without psychosis (H)    Migraine without aura and with status migrainosus, not intractable  -     topiramate (TOPAMAX) 25 MG tablet; Take 1 tablet (25 mg total) by mouth at bedtime.    Bipolar 2 disorder, major depressive episode (H)  -     lamoTRIgine (LAMICTAL) 200 MG tablet; Take 1 tablet (200 mg total) by mouth daily.    Bacterial vaginosis  -     metroNIDAZOLE (FLAGYL) 500 MG tablet; Take 1 tablet (500 mg total) by mouth 2 (two) times a day for 14 days.    Abnormal imaging of thyroid  -     US Thyroid; Future        Patient Instructions   For the hand itchiness  Try to identify triggers  I will send over-the-counter blood prescription form Claritin oral disintegrating you can take  10 mg up to twice daily    We are going to check your TSH today this is your thyroid-stimulating hormone  This is the brain telling the thyroid to make hormone  If this level is high it means your levels are actually low  In Graves' disease typically the thyroid levels are high and the TSH is low    If your TSH is suppressed or very very low I may ask you to start the methimazole again    There are some new  medications for migraines and migraine prophylaxis  These of the CGRP inhibitors  Some names you may have heard of  Ajovy, Emgality, Aimovig and Nurtec    These are still available that we could try if your migraines were worse          Return in about 3 months (around 5/9/2021).            CHIEF COMPLAINT: Tara Sorenson had concerns including Itchiness (hands and feet x 1 week. No rash present ).    [place]:  "1.............. SUBJECTIVE:  Tara Sorenson is a 31 y.o. female had concerns including Itchiness (hands and feet x 1 week. No rash present ).    1. Yeast infection of the vagina    2. Other stimulant abuse, uncomplicated (H)    3. MDD (major depressive disorder), recurrent severe, without psychosis (H)    4. Migraine without aura and with status migrainosus, not intractable    5. Bipolar 2 disorder, major depressive episode (H)    6. Bacterial vaginosis    7. Abnormal imaging of thyroid          Allergies   Allergen Reactions     Haloperidol Anxiety     Felt anxious at 20hrs post single 2mg IV dose of haloperidol lactate   Felt anxious at 20hrs post single 2mg IV dose of haloperidol lactate   Felt anxious at 20hrs post single 2mg IV dose of haloperidol lactate      Compazine [Prochlorperazine] Other (See Comments)     Anxiety      Metoclopramide Hcl Other (See Comments) and Unknown     \"It makes me feel like jumping out of my skin.\"  \"wanted to jump out of my skin\"                         SOCIAL: She  reports that she has been smoking cigarettes. She has been smoking about 0.25 packs per day. She has never used smokeless tobacco. She reports current drug use. Frequency: 7.00 times per week. Drug: Marijuana. She reports that she does not drink alcohol.    REVIEW OF SYSTEMS:   Family history not pertinent to chief complaint or presenting problem    Review of systems otherwise negative as requested from patient, except   Those positive ROS outlined and discussed in [place].      VITALS:  Vitals:    02/09/21 1402   BP: 90/50   Patient Site: Left Arm   Patient Position: Sitting   Cuff Size: Adult Regular   Pulse: 90   Temp: 98.1  F (36.7  C)   TempSrc: Oral   SpO2: 99%   Weight: 105 lb 1 oz (47.7 kg)     Wt Readings from Last 3 Encounters:   02/09/21 105 lb 1 oz (47.7 kg)   01/23/21 103 lb 9.6 oz (47 kg)   02/10/20 101 lb (45.8 kg)     Body mass index is 18.46 kg/m .    Physical Exam:  Thyroid palpable nodular " component on the right side  No proptosis  No tremor  Lungs are clear  Cardiac S1-S2 is sinus no appreciable murmur gallop  Toenail the left has some hemorrhages within the nail  Nothing in the nailbed         I spent 30 minutes with this patient.  This includes pre-visit, intra-visit and post visit work an evaluation with regards to Tara was seen today for itchiness.    Diagnoses and all orders for this visit:    Yeast infection of the vagina  -     fluconazole (DIFLUCAN) 150 MG tablet; Take 1 tablet (150 mg total) by mouth daily for 7 doses.    Other stimulant abuse, uncomplicated (H)    MDD (major depressive disorder), recurrent severe, without psychosis (H)    Migraine without aura and with status migrainosus, not intractable  -     topiramate (TOPAMAX) 25 MG tablet; Take 1 tablet (25 mg total) by mouth at bedtime.    Bipolar 2 disorder, major depressive episode (H)  -     lamoTRIgine (LAMICTAL) 200 MG tablet; Take 1 tablet (200 mg total) by mouth daily.    Bacterial vaginosis  -     metroNIDAZOLE (FLAGYL) 500 MG tablet; Take 1 tablet (500 mg total) by mouth 2 (two) times a day for 14 days.    Abnormal imaging of thyroid  -     US Thyroid; Future        Joshua Dumont MD  Family Medicine   [facility] [id number] [phone]

## 2021-06-15 NOTE — TELEPHONE ENCOUNTER
Pt is calling.    Complaining of itching x 1 week. Back, arms, stomach, all itches. Body started burning.   Now every single day since, she is having itching in her hands and feet as well.  Worse at night.   Cold does help with it. Uncontrolled itch and burning. No rash seen. No redness. No cracking.  Has tried Benadryl twice but that did not help very much. She did not get any relief and it caused her to lose her vision. Hasn't tried anything. Afraid to try Zyrtec or Claritan, due to her severe migraines.  No new soaps, lotions, detergents, foods, or meds. Nothing new.   Care advice reviewed. I advised her to set up an appointment to be seen tomorrow. She verbalized understanding.    Reason for Disposition    [1] MODERATE-SEVERE widespread itching (i.e., interferes with sleep, normal activities or school) AND [2] not improved after 24 hours of itching Care Advice    Additional Information    Negative: [1] Life-threatening reaction (anaphylaxis) in the past to similar substance (e.g., food, insect bite/sting, chemical, etc.) AND [2] < 2 hours since exposure    Negative: Difficulty breathing or wheezing    Negative: [1] Difficulty swallowing or slurred speech AND [2] sudden onset    Negative: Sounds like a life-threatening emergency to the triager    Negative: Could be severe allergic reaction    Negative: Insect bites suspected    Negative: Looks like hives (pink bumps with pale centers)    Negative: Yellowish color of the skin AND sclera (white of the eye)    Negative: Itching in just one area or spot    Negative: Widespread rash also present    Negative: Patient sounds very sick or weak to the triager    Protocols used: ITCHING - WIDESPREAD-A-    Mindy Joseph RN   [facility] Nurse Advisor  02/08/21 at 9:05 PM
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
## 2021-06-15 NOTE — PATIENT INSTRUCTIONS - HE
For the hand itchiness  Try to identify triggers  I will send over-the-counter blood prescription form Claritin oral disintegrating you can take  10 mg up to twice daily    We are going to check your TSH today this is your thyroid-stimulating hormone  This is the brain telling the thyroid to make hormone  If this level is high it means your levels are actually low  In Graves' disease typically the thyroid levels are high and the TSH is low    If your TSH is suppressed or very very low I may ask you to start the methimazole again    There are some new  medications for migraines and migraine prophylaxis  These of the CGRP inhibitors  Some names you may have heard of  Ajovy, Emgality, Aimovig and Nurtec    These are still available that we could try if your migraines were worse    Accomplish your thyroid ultrasound    Use the metronidazole for bacterial imbalance this is usually if you have follow odor or discharge vaginally    Fluconazole for yeast you can use this if you think you have a yeast infection such as itchiness        DanL

## 2021-06-15 NOTE — PROGRESS NOTES
Clinic Care Coordination Contact  [facility]/Voicemail     Proactive outreach to support patient to establish care with new PCP clinic due to legacy clinic closer    Clinical Data: Care Coordinator Outreach  Outreach attempted x 1.  Left message on patient's voicemail with call back information and requested return call.  Plan: C Care Coordinator will try to reach patient again in 1-2 business days.

## 2021-06-16 NOTE — TELEPHONE ENCOUNTER
Refill Approved    Rx renewed per Medication Renewal Policy. Medication was last renewed on 2/3/21.    Osito Santoro, [facility] Triage/Med Refill 4/5/2021     Requested Prescriptions   Pending Prescriptions Disp Refills     fluticasone propionate (FLONASE) 50 mcg/actuation nasal spray [Pharmacy Med Name: FLUTICASONE 50MCG NASAL SP (120) RX] 16 g 1     Sig: SHAKE LIQUID AND USE 2 SPRAYS IN EACH NOSTRIL DAILY       Nasal Steroid Refill Protocol Passed - 4/4/2021 11:53 PM        Passed - Patient has had office visit/physical in last 2 years     Last office visit with prescriber/PCP: 2/9/2021 OR same dept: 2/9/2021 Joshua Dumont MD OR same specialty: 2/9/2021 Joshua Dumont MD Last physical: 4/8/2019 Last MTM visit: Visit date not found    Next appt within 3 mo: Visit date not found  Next physical within 3 mo: Visit date not found  Prescriber OR PCP: Joshua Dumont MD  Last diagnosis associated with med order: 1. Nasal congestion  - fluticasone propionate (FLONASE) 50 mcg/actuation nasal spray [Pharmacy Med Name: FLUTICASONE 50MCG NASAL SP (120) RX]; SHAKE LIQUID AND USE 2 SPRAYS IN EACH NOSTRIL DAILY  Dispense: 16 g; Refill: 1     If protocol passes may refill for 12 months if within 3 months of last provider visit (or a total of 15 months).

## 2021-06-16 NOTE — TELEPHONE ENCOUNTER
Spoke with her, was able to set her up for video at 1:40    Katharine Thompson CMA ([facility])
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
## 2021-06-16 NOTE — PROGRESS NOTES
"Tara Sorenson is a 31 y.o. female who is being evaluated via a billable telephone visit.      What phone number would you like to be contacted at? [phone]   How would you like to obtain your AVS? AVS Preference: Eleanort.    Assessment & Plan   Problem List Items Addressed This Visit     Other insomnia                 Tobacco Cessation:   reports that she has been smoking cigarettes. She has been smoking about 0.25 packs per day. She has never used smokeless tobacco.    No follow-ups on file.    Joshua Dumont MD  [facility]      Subjective   Tara Sorenson is 31 y.o. and presents today for the following health issues   HPI         Review of Systems  Mouth movements  Monitor   Better sleep   Very forgetful     Downside Dry Mouth     Problem List Items Addressed This Visit     Other insomnia     \"going good\"  Seroquel Took late >> Saturday tough   After Adderall OK   Saturday Seroquel earlier    Tired after Seroquel takes about 1.5 hours to kick in       Sunday Adderall   Eating on the Adderall \"not hurting mouth\"    Weight 97 lbs today    Last 4/25 PM Seroquel 8 PM  (25 mg at bedtime)   Wake up for just a second   THis AM woke up OK   Adderall >> Eating     \"feel a bit different \"little bit different\"  Expectation >>> get better in combo >> no moments of being \"sulky\"    Medical Cannabis >> helps with hunger      Tara \"said I am calmer\"     I also notice \"adderall does not give me mouth movements\"  I am kind of more mellow\"         Classic Migraine (With Aura) With Intractable Migraine     Topamax  Lamictal  B VItamin   Mag     Maxalt   Ibuprofen   Not simultaneous  Tylenol and Motrin     Better migraines                      Objective       Vitals:  No vitals were obtained today due to virtual visit.    Physical Exam  None   Non pressured speech           Phone call duration: 21   minutes    "

## 2021-06-16 NOTE — PROGRESS NOTES
"  No problem-specific Assessment & Plan notes found for this encounter.      Tara Sorenson is a 31 y.o. female who is being evaluated via a billable telephone visit.      The patient has been notified of following:     \"This telephone visit will be conducted via a call between you and your physician/provider. We have found that certain health care needs can be provided without the need for a physical exam.  This service lets us provide the care you need with a short phone conversation.  If a prescription is necessary we can send it directly to your pharmacy.  If lab work is needed we can place an order for that and you can then stop by our lab to have the test done at a later time.    If during the course of the call the physician/provider feels a telephone visit is not appropriate, you will not be charged for this service.\"     Physician has received verbal consent for a Telephone Visit from the patient? Yes    Tara Sorenson complains of    Chief Complaint   Patient presents with     Follow-up     med check, needs something to help sleep     Problem List Items Addressed This Visit     Attention deficit hyperactivity disorder (ADHD), unspecified ADHD type - Primary     Sleep difficult   \"I can't sleep on it\"  Not tired   Fall asleep for a moment   Coincides with Adderall  11 AM 1st time and 7:30 AM       Second night better   Second     Eating an issue 95 lbs  Dry mouth     Topamax  25 mg 1 hs   Lamotrigine 200 mg 1 daily ( no skin Rash)   Magnesium   Vitamin B     Adderall 15 mg   11 AM     What is going with me??    Benefits:  \"Numbers at work are doing well\"  High School focused\"                      Relevant Medications    QUEtiapine (SEROQUEL) 25 MG tablet      Other Visit Diagnoses     Insomnia, unspecified type        Relevant Medications    QUEtiapine (SEROQUEL) 25 MG tablet        I have reviewed and updated the patient's Past Medical History, Social History, Family History and " Medication List.    ALLERGIES  Haloperidol, Compazine [prochlorperazine], and Metoclopramide hcl    Additional provider notes: insert own note template here  See note     Assessment/Plan:  1. Attention deficit hyperactivity disorder (ADHD), unspecified ADHD type      Adderall  15 mg Daily at 7:30 PM   Seroquel 25 -50 HS         Phone call duration:  19  minutes    Joshua Dumont MD

## 2021-06-16 NOTE — TELEPHONE ENCOUNTER
RN cannot approve Refill Request    RN can NOT refill this medication Protocol failed and NO refill given. Last office visit: 2/9/2021 Joshua Dumont MD Last Physical: 4/8/2019 Last MTM visit: Visit date not found Last visit same specialty: 2/9/2021 Joshua Dumont MD.  Next visit within 3 mo: Visit date not found  Next physical within 3 mo: Visit date not found      Phyllis Hamilton, [facility] Triage/Med Refill 4/10/2021    Requested Prescriptions   Pending Prescriptions Disp Refills     magnesium oxide (MAG-OX) 400 mg (241.3 mg magnesium) tablet 90 tablet 0     Sig: Take 1 tablet (400 mg total) by mouth daily.       There is no refill protocol information for this order

## 2021-06-16 NOTE — PROGRESS NOTES
"Assessment/Plan:   Fever/throat pain  Pharyngitis, clinically  Exposure to strep throat  Pregnancy  Daughter with strep this week following diagnosis a couple days before of bronchitis. Now with fever the last two days and ST.  Pregnant and receiving home infusions of IVF. 17 weeks EGA. RST negative but clinically has tonsillitis and tender nodes and fever without cough and known exposure.   - Rapid Strep A Screen-Throat  - Group A Strep, RNA Direct Detection, Throat  - penicillin VK (PEN VK) 500 MG tablet; Take 1 tablet (500 mg total) by mouth 2 (two) times a day for 10 days.  Dispense: 20 tablet; Refill: 0    Continue fluids as able  Tylenol as needed for pain or fever  Penicillin twice a day for 10 days, empirically  Contagious for 24 hours  Change toothbrush in 2-3 days  Follow up if worse or no better.       Subjective:      Tara Sorenson is a 28 y.o. female who presents with fever and ST.  She is pregnant, 17 weeks EGA and receiving home IV fluid infusions due to low blood pressure and N/V.  She has had fever the last 2 days, T100.4 yesterday and T101 today.  She has had ST, no URI , minimal cough. She was exposed to strep a couple days ago - her daughter was diagnosed then.  No rash. No vaginal bleeding or uterine cramping.     Allergies   Allergen Reactions     Benadryl [Diphenhydramine Hcl] Unknown     had previously tolerated     Compazine [Prochlorperazine] Other (See Comments)     Anxiety      Metoclopramide Hcl Other (See Comments) and Unknown     \"It makes me feel like jumping out of my skin.\"  \"wanted to jump out of my skin\"     Prochlorperazine Maleate Anxiety     Current Outpatient Prescriptions on File Prior to Visit   Medication Sig Dispense Refill     acetaminophen (TYLENOL) 500 MG tablet Take 1,000 mg by mouth every 6 (six) hours as needed for pain.       doxylamine (UNISON) 25 mg tablet Take 1 tablet (25 mg total) by mouth at bedtime as needed for sleep. 30 tablet 1     ergocalciferol " (ERGOCALCIFEROL) 50,000 unit capsule 1 every SUNday 12 capsule 3     folic acid (FOLVITE) 1 MG tablet TAKE 1 TABLET BY MOUTH DAILY 90 tablet 1     Lactobac. rhamnosus GG-inulin (CULTURELLE) 10 billion cell -200 mg CpSP Take 1 capsule by mouth daily. 1 daily 30 capsule 12     Lactobacillus acidophilus (BACID) 10 billion cell capsule 1 daily may substitute based on formulary 30 capsule 12     OMEGA-3/DHA/EPA/FISH OIL (FISH OIL-OMEGA-3 FATTY ACIDS) 300-1,000 mg capsule Take 1 capsule (1 g total) by mouth daily. 90 capsule 3     oxyCODONE-acetaminophen (PERCOCET) 5-325 mg per tablet Take 1 tablet by mouth every 4 (four) hours as needed for pain. 10 tablet 0     PRENATAL VITAMIN Tab TAKE 1 TABLET BY MOUTH DAILY 100 each 3     pyridoxine, vitamin B6, (VITAMIN B-6) 25 MG tablet Take 1 tablet (25 mg total) by mouth every 6 (six) hours. As needed for nausea 100 tablet 1     No current facility-administered medications on file prior to visit.      Patient Active Problem List   Diagnosis     Methamphetamine Abuse - In Remission     Pain During Urination (Dysuria)     Bipolar Disorder     Headache     Worsening Vision Started Suddenly     Classic Migraine (With Aura) With Intractable Migraine     Carrier of group B Streptococcus     Normal delivery     Abnormal imaging of thyroid     Paresthesia     Adjustment disorder with mixed anxiety and depressed mood     Generalized anxiety disorder     ADD (attention deficit disorder)       Objective:     /85 (Patient Site: Right Arm, Patient Position: Sitting)  Pulse 82  Temp 99  F (37.2  C) (Oral)   Wt 113 lb 3.2 oz (51.3 kg)  LMP 10/28/2017 (Approximate)  SpO2 98%  BMI 20.05 kg/m2    Physical  General Appearance: Alert, cooperative, no distress, fatigued and mildly ill appearing.   Head: Normocephalic, without obvious abnormality, atraumatic  Eyes: Conjunctivae are normal.  Ears: Normal TMs and external ear canals, both ears  Nose: No significant congestion.  Throat:  Throat is red.  No exudate.  No significant lesions  Neck: tender anterior adenopathy  Lungs: Clear to auscultation bilaterally, respirations unlabored  Heart: Regular rate and rhythm  Abdomen: Soft, non-tender  Skin:  no rashes or lesions  Psychiatric: Patient has a normal mood and affect.        Recent Results (from the past 24 hour(s))   Rapid Strep A Screen-Throat   Result Value Ref Range    Rapid Strep A Antigen No Group A Strep detected, presumptive negative No Group A Strep detected, presumptive negative

## 2021-06-16 NOTE — TELEPHONE ENCOUNTER
Telephone Encounter by Jaimie Vega RN at 12/5/2019  2:32 PM     Author: Jaimie Vega RN Service: Psychiatry Author Type: Registered Nurse    Filed: 12/5/2019  2:34 PM Encounter Date: 12/4/2019 Status: Signed    : Jaimie Vega RN (Registered Nurse)       Call placed to Tara on Callie Brown CNP's response:    Callie Brown CNP Kline, Lisa M, RN;  Mental Health Support Pool 11 minutes ago (2:19 PM)        Please call her back and ask her to stop taking Pristiq and we will discuss options on 12/9/19.     Thank you.     Callie      She said she has stopped the Pristiq, and agrees they will discuss other alternative at appointment on 12/09/2019.

## 2021-06-16 NOTE — TELEPHONE ENCOUNTER
Reason for Call: patient calling to get a RX to help her sleep, other than seroqel. If possible.  Detailed comments: she is taking adderall, now having difficulty sleeping. Please call her back asap, would like something before the weekend.    Phone Number Patient can be reached at: Home number on file [phone] (home)    Best Time: asap    Can we leave a detailed message on this number?: Yes    Call taken on 4/23/2021 at 12:21 PM by Michelle Dee

## 2021-06-16 NOTE — TELEPHONE ENCOUNTER
Telephone Encounter by Arleen Story at 12/12/2019  9:11 AM     Author: Arleen Story Service: -- Author Type: --    Filed: 12/12/2019  9:13 AM Encounter Date: 12/10/2019 Status: Signed    : Arleen Story       PRIOR AUTHORIZATION DENIED    Denial Rational: Medication must be prescribed by a Neurologist, Pain Management specialist, or Health Care Provider Certified by the [facility] [place] [facility] Neurological Subspecialties             Appeal Information: If provider would like to appeal please provide a letter of medical necessity and route back to the PA team.

## 2021-06-16 NOTE — TELEPHONE ENCOUNTER
Telephone Encounter by Arleen Story at 9/23/2019  1:23 PM     Author: Arleen Story Service: -- Author Type: --    Filed: 9/23/2019  1:24 PM Encounter Date: 9/19/2019 Status: Signed    : Arleen Story APPROVED:    Approval start date:06/23/2019  Approval end date:09/23/2020    Pharmacy has been notified of approval and will contact patient when medication is ready for pickup.
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
## 2021-06-17 NOTE — PATIENT INSTRUCTIONS - HE
Off work today tomorrow  TobraDex drops  Supportive cares  Monitor for Covid      THings to do    Covid SHots  Pap Smear

## 2021-06-17 NOTE — TELEPHONE ENCOUNTER
Prior Authorization Request  Who s requesting:  Pharmacy  Pharmacy Name and Location: [facility] [id number].   Medication Name: Tobramycin/ Dexamethasonesusp 5ML  Insurance Plan:   Insurance Member ID Number:    CoverMyMeds Key: N/A  Informed patient that prior authorizations can take up to 10 business days for response:   NA  Okay to leave a detailed message: No

## 2021-06-17 NOTE — TELEPHONE ENCOUNTER
Telephone Encounter by Arleen Story at 5/24/2021  8:33 AM     Author: Arleen Story Service: -- Author Type: --    Filed: 5/24/2021  8:34 AM Encounter Date: 5/18/2021 Status: Signed    : Arleen Story       PRIOR AUTHORIZATION DENIED    Denial Rational: Patient needs to try and fail alternatives:          Appeal Information: If provider would like to appeal please provide a letter of medical necessity stating why formulary alternatives would not be clinically appropriate for the patient and route back to the PA team.

## 2021-06-17 NOTE — PATIENT INSTRUCTIONS - HE
Patient Instructions by Marcelo Cordova DO at 2020 12:50 PM     Author: Marcelo Cordova DO Service: -- Author Type: Physician    Filed: 2020  2:12 PM Encounter Date: 2020 Status: Addendum    : Marcelo Cordova DO (Physician)    Related Notes: Original Note by Marcelo Cordova DO (Physician) filed at 2020  2:12 PM       See after visit summary hand out for useful information. We will notify you of the results of studies not known at time of exam, and treat appropriately.      Patient Education     Bacterial Vaginosis    You have a vaginal infection called bacterial vaginosis (BV). Both good and bad bacteria are present in a healthy vagina. BV occurs when these bacteria get out of balance. The number of bad bacteria increase. And the number of good bacteria decrease. Although BV is associated with sexual activity, it is not a sexually transmitted disease.  BV may or may not cause symptoms. If symptoms do occur, they can include:    Thin, gray, milky-white, or sometimes green discharge    Unpleasant odor or fishy smell    Itching, burning, or pain in or around the vagina  It is not known what causes BV, but certain factors can make the problem more likely. This can include:    Douching    Having sex with a new partner    Having sex with more than one partner  BV will sometimes go away on its own. But treatment is usually recommended. This is because untreated BV can increase the risk of more serious health problems such as:    Pelvic inflammatory disease (PID)     delivery (giving birth to a baby early if youre pregnant)    HIV and certain other sexually transmitted diseases (STDs)    Infection after surgery on the reproductive organs  Home care  General care    BV is most often treated with medicines called antibiotics. These may be given as pills or as a vaginal cream. If antibiotics are prescribed, be sure to use them exactly as directed. Also, be sure to complete all of the medicine,  even if your symptoms go away.    Don't douche or having sex during treatment.    If you have sex with a female partner, ask your healthcare provider if she should also be treated.  Prevention    Don't douche.    Don't have sex. If you do have sex, then take steps to lower your risk:  ? Use condoms when having sex.  ? Limit the number of sexual partners you have.  Follow-up care  Follow up with your healthcare provider, or as advised.  When to seek medical advice  Call your healthcare provider right away if:    You have a fever of 100.4 F (38 C) or higher, or as directed by your provider.    Your symptoms worsen, or they dont go away within a few days of starting treatment.    You have new pain in the lower belly or pelvic region.    You have side effects that bother you or a reaction to the pills or cream youre prescribed.    You or any partners you have sex with have new symptoms, such as a rash, joint pain, or sores.  Date Last Reviewed: 10/1/2017    7591-0028 [employer]. [address] [place] [address], [place] [address]. All rights reserved. This information is not intended as a substitute for professional medical care. Always follow your healthcare professional's instructions.

## 2021-06-17 NOTE — PATIENT INSTRUCTIONS - HE
Patient Instructions by Joshua Dumont MD at 4/25/2019  8:40 AM     Author: Joshua Dumont MD Service: -- Author Type: Physician    Filed: 4/25/2019  9:23 AM Encounter Date: 4/25/2019 Status: Addendum    : Joshua Dumont MD (Physician)    Related Notes: Original Note by Joshua Dumont MD (Physician) filed at 4/25/2019  9:22 AM       Please follow through with the referral for neurology  They will talked about migraine prophylaxis  Try to identify any triggers    Please take magnesium 400 mg daily  Take your B complex vitamin daily  Take vitamin B2 200 mg daily    Topamax cannot be used in breast-feeding as there is infant risk  You can discuss this with neurology for prophylaxis as well as discuss newer agents    Lowest new medication      The FDA approved two other CGRP drugs for migraine -- erenumab (Aimovig) and fremanezumab-vfrm (Ajovy) -- earlier this year.    Referred to plastic surgery to discuss your diastasis recti and possible surgical interventions      We also checked her thyroid today and we will check your thyroid levels     Also as an abortive agent for migraines  I prescribed Maxalt  You can take 1 at onset and repeat in 2 hours  Unfortunately this still is a medication that is unknown the effects and breast-feeding so this is a risk-benefit medication    Patient Education     Preventing Migraine Headaches: Triggers  The first step in preventing migraines is to learn what triggers them. You may then be able to control your triggers to avoid or reduce the severity of your migraines.  Know your triggers  Be aware that you may have more than one trigger, and that some triggers may work together. Common migraine triggers include:    Food and nutrition. Skipping meals or not drinking enough water can trigger headaches. So can certain foods, such as caffeine, monosodium glutamate (MSG), aged cheese, or sausage.    Alcohol. Red wine and other alcoholic beverages are common migraine  triggers.    Chemicals. Scents, cleaning products, gasoline, glue, perfume, and paint can be triggers. So can tobacco smoke, including secondhand smoke.    Emotions. Stress can trigger headaches or make them worse once they begin.    Sleep disruption. Staying up late, sleeping late, and traveling across time zones can disrupt your sleep cycle, triggering headaches.    Hormones. Many women notice that migraines tend to happen at a certain point in their menstrual cycle. Birth control pills or hormone replacement therapy may also trigger migraines.    Environment and weather. Air travel, changes in altitude, air pressure changes, hot sun, or bright or flashing lights can be triggers.  Control your triggers  These are some of the things you can do to try to control triggers:    Avoid triggers if you can. For example, stay clear of alcohol and foods that trigger your headaches. Use unscented household products. Keep regular sleep habits. Manage stress to help control emotional triggers.    Change your behavior at times when triggers can't be avoided. For example, make sure to get enough rest and drink plenty of water while you're traveling. Make sure to carry a hat, sunglasses, and your medicines. Be alert for migraine symptoms, so you can treat a migraine early if it happens.  Date Last Reviewed: 10/9/2015    0478-9811 [employer]. [address] [facility], [place] [address]. All rights reserved. This information is not intended as a substitute for professional medical care. Always follow your healthcare professional's instructions.

## 2021-06-17 NOTE — PROGRESS NOTES
"Tara Sorenson is a 31 y.o. female who is being evaluated via a billable telephone visit.      What phone number would you like to be contacted at? [phone]  How would you like to obtain your AVS? AVS Preference: Thomas.    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  No  Strep? Throat is sore >> No exposures   Covid 19    COuple of people at work prior   No recent     SMell and taste worsening Cough or SHortness of breath      Should be at home       Likely conjunctivitis  Upper respiratory infection  TobraDex as directed  Plenty of liquids Tea vitamin C rich foods juices  Worsening symptoms be seen                 Other Visit Diagnoses     Acute bacterial conjunctivitis of left eye    -  Primary    Relevant Medications    tobramycin-dexamethasone (TOBRADEX) ophthalmic solution           9}      Tobacco Cessation:   reports that she has been smoking cigarettes. She has been smoking about 0.25 packs per day. She has never used smokeless tobacco.  I have had an Advance Directives discussion with the patient.    No follow-ups on file.    Joshua Dumont MD  [facility]      Subjective   Tara Sorenson is 31 y.o. 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  No  Strep? Throat is sore >> No exposures   Covid 19    COuple of people at work prior   No recent     SMell and taste worsening Cough or SHortness of breath      Should be at home       Likely conjunctivitis  Upper respiratory infection  TobraDex as directed  Plenty of liquids Tea vitamin C rich foods juices  Worsening symptoms be seen                 Other Visit Diagnoses     Acute bacterial conjunctivitis of left eye    -  Primary    Relevant Medications    tobramycin-dexamethasone (TOBRADEX) ophthalmic solution        Patient Instructions   THings to do    Covid SHots  Pap Smear            Review of Systems  Follow-up in ADD  Is having trouble with Adderall keeping her awake  Currently is on Topamax 25 at bedtime  Lamotrigine 200 mg once daily  Magnesium vitamin D    Adderall at 11 AM    Added quetiapine 25-50 at bedtime          Objective       Vitals:  No vitals were obtained today due to virtual visit.    Physical Exam  No physical examination            Phone call duration: 18  minutes

## 2021-06-17 NOTE — TELEPHONE ENCOUNTER
Central PA team  [phone]  Pool: HE PA MED ([id number])          PA has been initiated.       PA form completed and faxed insurance via Cover My Meds     Key:  : [id number]     Medication:  Tobramycin-Dexamethasone 0.3-0.1% suspension    Insurance:  [employer] [place]        Response will be received via fax and may take up to 5-10 business days depending on plan

## 2021-06-17 NOTE — PATIENT INSTRUCTIONS - HE
Patient Instructions by Nora Robbins MD at 6/21/2019  6:50 PM     Author: Nora Robbins MD Service: -- Author Type: Physician    Filed: 6/21/2019  7:51 PM Encounter Date: 6/21/2019 Status: Addendum    : Nora Robbins MD (Physician)    Related Notes: Original Note by Nora Robbins MD (Physician) filed at 6/21/2019  7:50 PM       Soft diet  Ice packs  Cortisporin drops to the left ear as directed  No gum, no excess chewing or wide jaw opening  Recheck as needed    Patient Education     When You Have Temporomandibular Disorder (TMD)  The temporomandibular joint (TMJ) is a ball-and-socket joint located where the upper and lower jaws meet. The TMJ and its nearby muscles make up a complex, loosely connected system. Because of this, a problem in one part of the system can affect the other parts. This can cause you to have temporomandibular disorder (TMD).         How the temporomandibular joint works  You have 1 joint on each side of your mouth that together make up the TMJ. These joints are part of a large group of muscles, ligaments, and bones that work together as a system. When the system is healthy, you can talk, chew, and even yawn in comfort. Muscles contract and relax to open and close the joint. The disk is made of cartilage and is located between the condyle and the skull. It absorbs pressure in the joint and allows the jaws to open and close smoothly. Fluid (called synovial fluid) lubricates the joints. Ligaments connect the lower jaw bones to the skull. They also support the joint.  Common temporomandibular problems  When there is a problem with the TMJ and its related system, you can develop TMD. Common TMD problems include tight muscles, inflamed joints, and damaged joints.  In some cases, symptoms may be related to the teeth or bite.  Tight muscles  The muscles surrounding the TMJ can tighten (spasm) and cause  pain.    Referred pain happens in a part of the body separate from the source of the problem. For example, pain in the face or teeth could be coming from a problem in the TMJ.    Myofascial pain happens in soft tissues, like muscle. Trigger points in these pain areas often cause referred pain. You may feel jaw, neck, or shoulder pain.  Inflamed joints  Inflammation may include pain, redness, heat, swelling, or loss of function.    Synovitis happens when certain tissues surrounding the TMJ become inflamed. It causes pain that increases with jaw movement.    Inflamed ligaments can be caused by strain or injury. When this happens, the ligaments are unable to support the joint.    Rheumatoid arthritis is a joint disease. It leads to inflammation in the TMJ.  Damaged joints  Many people hear clicking when their jaw moves. If you feel pain along with the noise, the joint may be damaged.    Impingement happens when the disk slips out of place (displacement). This causes the jaw to catch. As the disk slips, you may hear a clicking sound.    Locked jaw happens when the disk gets stuck in one position. As a result, the jaw locks open or closed.    Osteoarthritis is a joint disease. It causes the TMJ to wear away (degenerate). This leads to pain during movement.  Other problems  The parts of the jaw and mouth make up a single unit. Thats why a problem in 1 area can cause symptoms elsewhere. Teeth or bite problems linked to TMD include:    Grinding your teeth side to side (bruxism)    Biting down on your teeth (clenching)    When the teeth or bite is out of alignment (malocclusion)  Your healthcare provider will give you more information about these problems if needed.   Date Last Reviewed: 8/1/2017 2000-2017 [employer]. [address] [place] [address]. All rights reserved. This information is not intended as a substitute for professional medical care. Always follow your healthcare professional's  instructions.

## 2021-06-18 NOTE — PATIENT INSTRUCTIONS - HE
Patient Instructions by Sammy Abraham PA-C at 2/10/2020  6:30 PM     Author: Sammy Abraham PA-C Service: -- Author Type: Physician Assistant    Filed: 2/10/2020  7:25 PM Encounter Date: 2/10/2020 Status: Addendum    : Sammy Abraham PA-C (Physician Assistant)    Related Notes: Original Note by Sammy Abraham PA-C (Physician Assistant) filed at 2/10/2020  7:24 PM       Take the medication as written.    Follow-up with your primary care provider if not getting good resolution of new symptoms or concerns arise.      Patient Education     Yeast Infection (Candida Vaginal Infection)    You have a Candida vaginal infection. This is also known as a yeast infection. It is most often caused by a type of yeast (fungus) called Candida. Candida are normally found in the vagina. But if they increase in number, this can lead to infection and cause symptoms.  Symptoms of a yeast infection can include:    Clumpy or thin, white discharge, which may look like cottage cheese    Itching or burning    Burning with urination  Certain factors can make a yeast infection more likely. These can include:    Taking certain medicines, such as antibiotics or birth control pills    Pregnancy    Diabetes    Weak immune system  A yeast infection is most often treated with antifungal medicine. This may be given as a vaginal cream or pills you take by mouth. Treatment may last for about 1 to 7 days. Women with severe or recurrent infections may need longer courses of treatment.  Home care    If youre prescribed medicine, be sure to use it as directed. Finish all of the medicine, even if your symptoms go away. Note: Dont try to treat yourself using over-the-counter products without talking to your provider first. He or she will let you know if this is a good option for you.    Ask your provider what steps you can take to help reduce your risk of having a yeast infection in the future.  Follow-up care  Follow up with your healthcare provider, or as  directed.  When to seek medical advice  Call your healthcare provider right away if:    You have a fever of 100.4 F (38 C) or higher, or as directed by your provider.    Your symptoms worsen, or they dont go away within a few days of starting treatment.    You have new pain in the lower belly or pelvic region.    You have side effects that bother you or a reaction to the cream or pills youre prescribed.    You or any partners you have sex with have new symptoms, such as a rash, joint pain, or sores.  Date Last Reviewed: 10/1/2017    0150-4252 [employer]. [address] [place], [place] [id number]. All rights reserved. This information is not intended as a substitute for professional medical care. Always follow your healthcare professional's instructions.

## 2021-06-18 NOTE — PATIENT INSTRUCTIONS - HE
Patient Instructions by Johana Zuniga MD at 10/16/2020  6:30 PM     Author: Johana Zuniga MD Service: -- Author Type: Physician    Filed: 10/16/2020  6:49 PM Encounter Date: 10/16/2020 Status: Addendum    : Johana Zuniga MD (Physician)    Related Notes: Original Note by Johana Zuniga MD (Physician) filed at 10/16/2020  6:49 PM         Start drops for infection of ear canal    Start amoxicillin for infection behind ear drum    If fever, no better, return to clinic        Patient Education     External Ear Infection (Adult)    External otitis (also called swimmers ear) is an infection in the ear canal. It is often caused by bacteria or fungus. It can occur a few days after water gets trapped in the ear canal (from swimming or bathing). It can also occur after cleaning too deeply in the ear canal with a cotton swab or other object. Sometimes, hair care products get into the ear canal and cause this problem.  Symptoms can include pain, fever, itching, redness, drainage, or swelling of the ear canal. Temporary hearing loss may also occur.  Home care    Do not try to clean the ear canal. This can push pus and bacteria deeper into the canal.    Use prescribed ear drops as directed. These help reduce swelling and fight the infection. If an ear wick was placed in the ear canal, apply drops right onto the end of the wick. The wick will draw the medicine into the ear canal even if it is swollen closed.    A cotton ball may be loosely placed in the outer ear to absorb any drainage.    You may use acetaminophen or ibuprofen to control pain, unless another medicine was prescribed. Note: If you have chronic liver or kidney disease or ever had a stomach ulcer or GI bleeding, talk to your healthcare provider before taking any of these medicines.    Do not allow water to get into your ear when bathing. Also, don't swim until the infection has cleared.  Prevention    Keep your ears dry.  This helps lower the risk of infection. Dry your ears with a towel or hair dryer after getting wet. Also, use ear plugs when swimming.    Do not stick any objects in the ear to remove wax.    If you feel water trapped in your ear, use ear drops right away. You can get these drops over the counter at most drugstores. They work by removing water from the ear canal.  Follow-up care  Follow up with your healthcare provider in 1 week, or as advised.  When to seek medical advice  Call your healthcare provider right away if any of these occur:    Ear pain becomes worse or doesnt improve after 3 days of treatment    Redness or swelling of the outer ear occurs or gets worse    Headache    Painful or stiff neck    Drowsiness or confusion    Fever of 100.4 F (38 C) or higher, or as directed by your healthcare provider    Seizure  Date Last Reviewed: 10/1/2017    0220-5915 [employer]. [address] [place] [address]. All rights reserved. This information is not intended as a substitute for professional medical care. Always follow your healthcare professional's instructions.           Patient Education     Middle Ear Infection (Adult)  You have an infection of the middle ear, the space behind the eardrum. This is also called acute otitis media (AOM). Sometimes it is caused by the common cold. This is because congestion can block the internal passage (eustachian tube) that drains fluid from the middle ear. When the middle ear fills with fluid, bacteria can grow there and cause an infection. Oral antibiotics are used to treat this illness, not ear drops. Symptoms usually start to improve within 1 to 2 days of treatment.    Home care  The following are general care guidelines:    Finish all of the antibiotic medicine given, even though you may feel better after the first few days.    You may use over-the-counter medicine, such as acetaminophen or ibuprofen, to control pain and fever, unless something else  was prescribed. If you have chronic liver or kidney disease or have ever had a stomach ulcer or gastrointestinal bleeding, talk with your healthcare provider before using these medicines. Do not give aspirin to anyone under 18 years of age who has a fever. It may cause severe illness or death.  Follow-up care  Follow up with your healthcare provider, or as advised, in 2 weeks if all symptoms have not gotten better, or if hearing doesn't go back to normal within 1 month.  When to seek medical advice  Call your healthcare provider right away if any of these occur:    Ear pain gets worse or does not improve after 3 days of treatment    Unusual drowsiness or confusion    Neck pain, stiff neck, or headache    Fluid or blood draining from the ear canal    Fever of 100.4 F (38 C) or as advised     Seizure  Date Last Reviewed: 6/1/2016 2000-2017 [employer]. [address] [place] [address]. All rights reserved. This information is not intended as a substitute for professional medical care. Always follow your healthcare professional's instructions.

## 2021-06-18 NOTE — LETTER
Letter by Joshua Dumont MD at      Author: Joshua Dumont MD Service: -- Author Type: --    Filed:  Encounter Date: 1/21/2019 Status: (Other)       Tara Sorenson  [address]  [place] [address]             January 21, 2019         Dear Ms. Indira Sorenson,    Below are the results from your recent visit:    Resulted Orders   Thyroid Cascade   Result Value Ref Range    TSH 0.01 (L) 0.30 - 5.00 uIU/mL   Iron and Transferrin Iron Binding Capacity   Result Value Ref Range    Iron 132 42 - 175 ug/dL    Transferrin 254 212 - 360 mg/dL    Transferrin Saturation, Calculated 42 20 - 50 %    Transferrin IBC, Calculated 318 313 - 563 ug/dL   Ferritin   Result Value Ref Range    Ferritin 57 10 - 130 ng/mL   Vitamin B12   Result Value Ref Range    Vitamin B-12 567 213 - 816 pg/mL   Magnesium, Red Blood Cell   Result Value Ref Range    Scan Result See Scanned Report     Magnesium Red Blood Cell 5.7 3.5 - 7.1 mg/dL      Comment:          This test was developed and its performance characteristics determined by [facility]. It has not been cleared or approved by the Food and Drug Administration.    Performed by:                [facility]  [address] [place] [address]  [phone]   T4, Free   Result Value Ref Range    Free T4 1.4 0.7 - 1.8 ng/dL   Thyroid-Stimulating Immunoglobulin   Result Value Ref Range    Thyroid Stimulating Immunoglobulin 93 <=122 %      Comment:      INTERPRETIVE INFORMATION: Thyroid Stimulating Immunoglobulin           Negative - 122 percent basal activity or less         Positive - 123 percent basal activity or greater    Positive results (123 percent or greater) are consistent with   Graves disease but do not always correlate with the presence and   severity of hyperthyroidism. Antibodies to the thyroid stimulating   hormone receptor (TSHR) may be stimulating, blocking, or neutral.   Stimulating antibodies mimic the action of TSH and may cause   hyperthyroidism  (Graves disease). This test determines the net   effect of all TSHR antibody types present in the serum specimen.    Test developed and characteristics determined by [facility]. See Compliance Statement B: [facility]  Performed by [facility],  [address] [place] [address] [phone]  [email or web address], Edison Narayanan MD, Lab. Director   Thyroid Peroxidase Antibody   Result Value Ref Range    Thyroid Peroxidase Ab <3.0 0.0 - 5.6 IU/mL       Follow Advice of Endocrinology   [facility]    Please call with questions or contact us using [employer].    Sincerely,        Electronically signed by Joshua Dumont MD

## 2021-06-18 NOTE — PATIENT INSTRUCTIONS - HE
Patient Instructions by Quan Thomason CNP at 1/23/2021  1:30 PM     Author: Quan Thomason CNP Service: -- Author Type: Nurse Practitioner    Filed: 1/23/2021  3:17 PM Encounter Date: 1/23/2021 Status: Signed    : Quan Thomason CNP (Nurse Practitioner)         Patient Education     Vaginal Infection: Yeast (Candidiasis)  Yeast infection occurs when yeast in the vagina increase and attacks the vaginal tissues. Yeast is a type of fungus. These infections are often caused by a type of yeast called Candida albicans. Other species of yeast can also cause infections. Factors that may make infection more likely include recent antibiotic use, douching, or increased sex. Yeast infections are more common in women who have diabetes, or are obese or pregnant, or have a weak immune system.  Symptoms of yeast infection    Clumpy or thin, white discharge, which may look like cottage cheese    No odor or minimal odor    Severe vaginal itching or burning    Burning with urination    Swelling, redness of vulva    Pain during sex    Treating yeast infection  Yeast infection is treated with a vaginal antifungal cream. In some cases, antifungal pills are prescribed instead. During treatment:    Finish all of your medicine, even if your symptoms go away.    Apply the cream before going to bed. Lie flat after applying so that it doesn't drip out.    Do not douche or use tampons.    Don't rely on a diaphragm or condoms, since the cream may weaken them.    Avoid intercourse if advised by your healthcare provider.  Should I treat a yeast infection myself?  Discuss with your healthcare provider whether you should use over-the-counter medicines to treat a yeast infection. Self-treatment may depend on whether:    You've had a yeast infection in the past.    You're at risk for STDs.  Call your healthcare provider if symptoms do not go away or come back after treatment.  Date Last Reviewed: 3/1/2017    3294-6683 [employer]. [address] [place] [address]. All rights reserved. This information is not intended as a substitute for professional medical care. Always follow your healthcare professional's instructions.           Patient Education     Bacterial Vaginosis    You have a vaginal infection called bacterial vaginosis (BV). Both good and bad bacteria are present in a healthy vagina. BV occurs when these bacteria get out of balance. The number of bad bacteria increase. And the number of good bacteria decrease. Although BV is associated with sexual activity, it is not a sexually transmitted disease.  BV may or may not cause symptoms. If symptoms do occur, they can include:    Thin, gray, milky-white, or sometimes green discharge    Unpleasant odor or fishy smell    Itching, burning, or pain in or around the vagina  It is not known what causes BV, but certain factors can make the problem more likely. This can include:    Douching    Having sex with a new partner    Having sex with more than one partner  BV will sometimes go away on its own. But treatment is usually recommended. This is because untreated BV can increase the risk of more serious health problems such as:    Pelvic inflammatory disease (PID)     delivery (giving birth to a baby early if youre pregnant)    HIV and certain other sexually transmitted diseases (STDs)    Infection after surgery on the reproductive organs  Home care  General care    BV is most often treated with medicines called antibiotics. These may be given as pills or as a vaginal cream. If antibiotics are prescribed, be sure to use them exactly as directed. Also, be sure to complete all of the medicine, even if your symptoms go away.    Don't douche or having sex during treatment.    If you have sex with a female partner, ask your healthcare provider if she should also be treated.  Prevention    Don't douche.    Don't have sex. If you do have sex, then take steps to lower  your risk:  ? Use condoms when having sex.  ? Limit the number of sexual partners you have.  Follow-up care  Follow up with your healthcare provider, or as advised.  When to seek medical advice  Call your healthcare provider right away if:    You have a fever of 100.4 F (38 C) or higher, or as directed by your provider.    Your symptoms worsen, or they dont go away within a few days of starting treatment.    You have new pain in the lower belly or pelvic region.    You have side effects that bother you or a reaction to the pills or cream youre prescribed.    You or any partners you have sex with have new symptoms, such as a rash, joint pain, or sores.  Date Last Reviewed: 10/1/2017    7418-0476 [employer]. [address] [place] [address], [place] [address]. All rights reserved. This information is not intended as a substitute for professional medical care. Always follow your healthcare professional's instructions.           Patient Education     What Are Sexually Transmitted Infections (STIs)?  A sexually transmitted infection (STI) is an infection that is spread during sex. An STI can also be called STD for sexually transmitted disease. You can become infected with an STI if you have sex with someone who has an STI. Any sex that involves the penis, vagina, anus, or mouth can spread these infections. Some STIs also spread through body fluids such as semen, vaginal fluid, or blood. Others spread through contact with infected skin. The most common STIs are chlamydia, genital warts , genital herpes, syphilis, HIV, gonorrhea, and trichomoniasis.   Who is at risk?     Places on or in the body where STDs cause damage include reproductive organs, the rectum, and the mouth.   It doesnt matter if youre straight or pruett, male or female, young or old. Any person who has sex can get an STI. Your risk increases if:    You have more than one partner. The more partners you have, the greater your risk.    Your partner has  other partners. If your partner is exposed to an STI, you could be, too.    You or your partner have had sex with other people in the past. Either of you might be carrying an STI from an earlier partner.    You have an STI. The STI may cause sores or other health problems that increase your risk for new infections. Your risk will stay high unless you are treated for your current STI and change the behaviors that put you at risk.  Prevent future problems  Left untreated, certain STIs can lead to cancer or, rarely, death. Some can harm unborn babies whose mothers are infected. Others can cause you to not be able to have children (sterility) or can affect changes in behavior or your ability to think. You can prevent these problems with safer sex, regular checkups, and early treatment. Always use a latex condom when you have sex. Get tested if youre at risk. And get treated early if you have an STI.  Getting checked  The only sure way to know if you have an STI is to get checked by a healthcare provider. If you notice a change in how your body looks or feels, have it checked out. But keep in mind, STIs dont always show symptoms. So if youre at risk for STIs, get checked regularly. If you find you have an STI, have your partner get treatment. If not, his or her health is at risk. And left untreated, your partner could pass the STI back to you, or on to others.  Common symptoms  Be alert to any changes in your body and your partners body. Symptoms may appear in or near the vagina, penis, rectum, mouth, or throat. They may include:    Unusual discharge    Lumps, bumps, or rashes    Sores that may be painful, itchy, or painless    Itchy skin    Burning with urination    Pain in the pelvis, belly (abdomen), or rectum    Bleeding from the rectum  Even if you dont have symptoms  You may have an STI even if you dont have symptoms. If you think you are at risk, get checked. Go to a clinic or to your healthcare provider. If your  partner has an STI, you need to be tested even if you feel fine.  Vaccines to prevent disease  Vaccines are available to prevent hepatitis A and hepatitis B. These are 2 kinds of STIs. There is also a vaccine to prevent human papillomavirus (HPV). This is a virus that can be passed from person to person through sexual contact. Ask your healthcare provider whether any of these vaccines is right for you.  Date Last Reviewed: 12/1/2018 2000-2019 [employer]. [address] [place] [address]. All rights reserved. This information is not intended as a substitute for professional medical care. Always follow your healthcare professional's instructions.           Patient Education     Otitis Media (Middle-Ear Infection) in Adults  Otitis media is another name for a middle-ear infection. It means an infection behind your eardrum. This kind of ear infection can happen after any condition that keeps fluid from draining from the middle ear. These conditions include allergies, a cold, a sore throat, or a respiratory infection.  Middle-ear infections are common in children, but they can also happen in adults. An ear infection in an adult may mean a more serious problem than in a child. So you may need additional tests. If you have an ear infection, you should see your health care provider for treatment.  What are the types of middle-ear infections?  Infections can affect the middle ear in several ways. They are:    Acute otitis media. This middle-ear infection occurs suddenly. It causes swelling and redness. Fluid and mucus become trapped inside the ear. You can have a fever and ear pain.    Otitis media with effusion. Fluid (effusion) and mucus build up in the middle ear after the infection goes away. You may feel like your middle ear is full. This can continue for months and may affect your hearing.    Chronic otitis media with effusion. Fluid (effusion) remains in the middle ear for a long time. Or it  builds up again and again, even though there is no infection. This type of middle-ear infection may be hard to treat. It may also affect your hearing.  Who is more likely to get a middle-ear infection?  You are more likely to get an ear infection if you:    Smoke or are around someone who smokes    Have seasonal or year-round allergy symptoms    Have a cold or other upper respiratory infection  What causes a middle-ear infection?  The middle ear connects to the throat by a canal called the eustachian tube. This tube helps even out the pressure between the outer ear and the inner ear. A cold or allergy can irritate the tube or cause the area around it to swell. This can keep fluid from draining from the middle ear. The fluid builds up behind the eardrum. Bacteria and viruses can grow in this fluid. The bacteria and viruses cause the middle-ear infection.  What are the symptoms of a middle-ear infection?  Common symptoms of a middle-ear infection in adults are:    Pain in 1 or both ears    Drainage from the ear    Muffled hearing    Sore throat   You may also have a fever. Rarely, your balance can be affected.  These symptoms may be the same as for other conditions. Its important to talk with your health care provider if you think you have a middle-ear infection. If you have a high fever, severe pain behind your ear, or paralysis in your face, see your provider as soon as you can.  How is a middle-ear infection diagnosed?  Your health care provider will take a medical history and do a physical exam. He or she will look at the outer ear and eardrum with an otoscope. The otoscope is a lighted tool that lets your provider see inside the ear. A pneumatic otoscope blows a puff of air into the ear to check how well your eardrum moves. If you eardrum doesnt move well, it may mean you have fluid behind it.  Your provider may also do a test called tympanometry. This test tells how well the middle ear is working. It can find  any changes in pressure in the middle ear. Your provider may test your hearing with a tuning fork.  How is a middle-ear infection treated?  A middle-ear infection may be treated with:    Antibiotics, taken by mouth or as ear drops    Medication for pain    Decongestants, antihistamines, or nasal steroids  Your health care provider may also have you try autoinsufflation. This helps adjust the air pressure in your ear. For this, you pinch your nose and gently exhale. This forces air back through the eustachian tube.  The exact treatment for your ear infection will depend on the type of infection you have. In general, if your symptoms dont get better in 48 to 72 hours, contact your health care provider.  Middle-ear infections can cause long-term problems if not treated. They can lead to:    Infection in other parts of the head    Permanent hearing loss    Paralysis of a nerve in your face  If you have a middle-ear infection that doesnt get better, you may need to see an ear, nose, and throat specialist (otolaryngologist). You may need a CT scan or MRI to check for head and neck cancer.  Ear tubes  Sometimes fluid stays in the middle ear even after you take antibiotics and the infection goes away. In this case, your health care provider may suggest that a small tube be placed in your ear. The tube is put at the opening of the eardrum. The tube keeps fluid from building up and relieves pressure in the middle ear. It can also help you hear better. This surgery is called myringotomy. It is not often done in adults.  The tubes usually fall out on their own after 6 months to a year.    7898-2532 [employer]. [address] [place] [address], [place] [address]. All rights reserved. This information is not intended as a substitute for professional medical care. Always follow your healthcare professional's instructions.

## 2021-06-18 NOTE — PATIENT INSTRUCTIONS - HE
Patient Instructions by Sammy Valadez MD at 11/20/2020  6:50 PM     Author: Sammy Valadez MD Service: -- Author Type: Physician    Filed: 11/20/2020  7:03 PM Encounter Date: 11/20/2020 Status: Signed    : Sammy Valadez MD (Physician)         Patient Education     Earache, No Infection (Adult)  Earaches can happen without an infection. This occurs when air and fluid build up behind the eardrum causing a feeling of fullness and discomfort and reduced hearing. This is called otitis media with effusion (OME) or serous otitis media. It means there is fluid in the middle ear. It is not the same as acute otitis media, which is typically from infection.  OME can happen when you have a cold if congestion blocks the passage that drains the middle ear. This passage is called the eustachian tube. OME may also occur with nasal allergies or after a bacterial middle ear infection.    The pain or discomfort may come and go. You may hear clicking or popping sounds when you chew or swallow. You may feel that your balance is off. Or you may hear ringing in the ear.  It often takes from several weeks up to 3 months for the fluid to clear on its own. Oral pain relievers and ear drops help if there is pain. Decongestants and antihistamines sometimes help. Antibiotics don't help since there is no infection. Your doctor may prescribe a nasal spray to help reduce swelling in the nose and eustachian tube. This can allow the ear to drain.  If your OME doesn't improve after 3 months, surgery may be used to drain the fluid and insert a small tube in the eardrum to allow continued drainage.  Because the middle ear fluid can become infected, it is important to watch for signs of an ear infection which may develop later. These signs include increased ear pain, fever, or drainage from the ear.  Home care  The following guidelines will help you care for yourself at home:    You may use over-the-counter medicine as directed to  control pain, unless another medicine was prescribed. If you have chronic liver or kidney disease or ever had a stomach ulcer or GI bleeding, talk with your doctor before using these medicines. Aspirin should never be used in anyone under 18 years of age who is ill with a fever. It may cause severe liver damage.    You may use over-the-counter decongestants such as phenylephrine or pseudoephedrine. But they are not always helpful. Don't use nasal spray decongestants more than 3 days. Longer use can make congestion worse. Prescription nasal sprays from your doctor don't typically have those restrictions.    Antihistamines may help if you are also having allergy symptoms.    You may use medicines such as guaifenesin to thin mucus and promote drainage.  Follow-up care  Follow up with your healthcare provider or as advised if you are not feeling better after 3 days.  When to seek medical advice  Call your healthcare provider right away if any of the following occur:    Your ear pain gets worse or does not start to improve     Fever of 100.4 F (38 C) or higher, or as directed by your healthcare provider    Fluid or blood draining from the ear    Headache or sinus pain    Stiff neck    Unusual drowsiness or confusion  Date Last Reviewed: 10/1/2016    4790-3431 [employer]. [address] [place] [address], [place] [address]. All rights reserved. This information is not intended as a substitute for professional medical care. Always follow your healthcare professional's instructions.

## 2021-06-19 NOTE — LETTER
Letter by Joshua Dumont MD at      Author: Joshua Dumont MD Service: -- Author Type: --    Filed:  Encounter Date: 10/14/2019 Status: Signed         October 14, 2019     Patient: Tara Sorenson   YOB: 1990   Date of Visit: 10/14/2019       To Whom It May Concern:    It is my medical opinion that Tara Sorenson recently had a workability evaluation form filled out.  In this form I stated that she should remain out of work.  At this juncture I do not foresee a return to work in the foreseeable future.    If you have any questions or concerns, please don't hesitate to call.    Sincerely,        Electronically signed by Joshua Dumont MD
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
## 2021-06-19 NOTE — LETTER
Letter by Joshua Dumont MD at      Author: Joshua Dumont MD Service: -- Author Type: --    Filed:  Encounter Date: 9/17/2019 Status: (Other)         September 17, 2019     Patient: Tara Sorenson   YOB: 1990   Date of Visit: 9/17/2019       To Whom it May Concern:    Tara Sorenson was seen in my clinic on 9/17/2019.  I have helps to participate in the care of Tara since at least 2011.   She has chronic daily headaches and intermittent severe debilitating migraines.    She also continues to suffer from chronic mental illness including, bipolar affective disorder, anxiety and depression, and likely posttraumatic stress disorder.    I believe these symptoms are chronic and daily.  Unfortunately due to her chronic medical conditions, she is unable to maintain repetitive employment.  Daily symptoms which prohibit this include difficulty with concentration, emotional lability, racing thoughts, and depression and anxiety.    If you have any questions or concerns, please don't hesitate to call.    Sincerely,         Electronically signed by Joshua Dumont MD

## 2021-06-19 NOTE — LETTER
Letter by Callie Brown CNP at      Author: Callie Brown CNP Service: -- Author Type: --    Filed:  Encounter Date: 7/29/2019 Status: (Other)         Tara Sorenson  [address]  [place] [address]         [facility] Authoizations department.  July 29, 2019    Dear Prior Authorization Team:  Concerning case #[id number]    I am requesting prior authorization coverage for desvenlafaxine 50 mg tablets, 1 tablet by mouth every day quantity 30 with 5 refills for the above patient.  Initial prior authorization denied due to concern for formulary preferred formulary drug coverage trials.  Please see information below  Bupropion (all formulations): moderate gene drug interaction-relatively contraindicated.  Mirtazapine (all formulations): moderate gene drug interaction-relatively contraindicated  Venlafaxine (all formulations): moderate gene drug interaction    Source of genetic testing is through [employer].  Desvenlafaxine has no gene drug interactions and I believe it is the safe and clinically indicated medication for her medical condition.              Electronically signed by Callie Brown CNP

## 2021-06-20 NOTE — LETTER
Letter by Rylee Pillai RN at      Author: Rylee Pillai RN Service: -- Author Type: --    Filed:  Encounter Date: 9/11/2020 Status: (Other)         September 11, 2020              Tara C Indira Soresnon  [address] [place] [address]      Dear Ms. Indira Sorenson:    Im writing to inform you that Callie Brown CNP will be leaving [facility] on September 28, 2020.  We apologize for this disruption in your care and we are committed to supporting your treatment needs. If you have follow-up appointments after September 28, 2020, we will connect you with another mental health provider within our system.  For medication refills, please contact your pharmacy and they will connect with us to initiate the refill process.     To schedule an appointment with Callie Brown CNP before September 28, 2020 please call [facility] at [phone]. If you do not plan to return for ongoing medication management at this time, please let us know that as well, so we can note that in your medical record.     Again, we apologize for the inconvenience and look forward to continuing to provide you with the best possible care.    Sincerely,        Electronically signed by Rylee Pillai RN   [facility]   [facility]   [address] [place] [address]   [phone]

## 2021-06-20 NOTE — PROGRESS NOTES
TCM DISCHARGE FOLLOW UP CALL    Discharge Date:  8/28/2018  Reason for hospital stay (discharge diagnosis)::  Mastitis  Are you feeling better, the same or worse since your discharge?:  Patient is feeling better  Do you feel like you have a plan in the event of a health emergency?: Yes (Return to ER if sx worsen.  RN advised pt of 24/7 nurse triage services through clinic)    As part of your discharge plan, were  home care services ordered for you?: No    Did you receive any new medications, or was there a change to your medications?: Yes    Are you taking those medications, or do you have any established regiment?:  Yes - Clindamycin.  Taking as prescribed.  States she is also taking lecithin, OTC product to help w/clogged milk duct (recommended by the nurse).  Do you have any follow up visits scheduled with your PCP or Specialist?:  Yes, with PCP (Dr. Dumont 9/4/18)  (RN) Is PCP appt scheduled soon enough (within 14 days of discharge date)?: Yes        Shira Parra RN Care Manager, Population Health
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
## 2021-06-21 NOTE — PROGRESS NOTES
WALK IN CARE - VISIT NOTE    HPI    29 yo female presenting for evaluation of:    1. Left eye swelling  - patient started having pain, itching, and white discharge from her left eye last night  - this AM, patient noticed her lower eye lid was swollen  - continuing to have thick drainage from eye  - no fevers/chills/sweats  - no reported changes in vision  - no trauma to eye  - no recent colds/URI like sympoms    ROS  Negative except as noted in HPI    OBJECTIVE    Vitals  Vitals:    10/29/18 1638   BP: 99/60   Pulse: 79   Resp: 16   Temp: 97.5  F (36.4  C)   SpO2: 98%       Physical Exam  General: No acute distress  Eyes: EOMI, PERRLA, normal conjunctiva, reddened sclera in left eye, stye noted on lower left eye lid, thick discharge in medial canthus; fluorescien test negative  Nose: moist mucosa, no rhinorrhea  CV: RRR, distal and peripheral pulses in tact  Resp: CTA bilaterally, no wheezing, no rhonchi, no rhales, no respiratory distress    Labs  N/A      ASSESSMENT/PLAN      # Left bacterial conjunctivitis  #Left lower lid stye  - ciprofloxacin eye drops, 1 drop q4h while awake x5 days  - ibuprofen 600 mg three times a day PRN pain  - warm compresses

## 2021-06-21 NOTE — LETTER
Letter by Joshua Dumont MD at      Author: Joshua Dumont MD Service: -- Author Type: --    Filed:  Encounter Date: 5/10/2021 Status: (Other)         May 10, 2021     Patient: Tara Sorenson   YOB: 1990   Date of Visit: 5/10/2021       To Whom It May Concern:    It is my medical opinion that Tara Sorenson has an acute onset of a Upper Respiratory Infection and Conjunctivitis.    She should stay home from work from 5/10/2021 through 5/11/2021.          If you have any questions or concerns, please don't hesitate to call.    Sincerely,        Electronically signed by Joshua Dumont MD
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
## 2021-06-22 NOTE — TELEPHONE ENCOUNTER
Please chart prep for appt tomorrow    Date: 1/10/2019 Status: [place]   Time: 2:20 PM Length: 40   Visit Type: CONSULT [[id number]] Copay: $0.00   Provider: Mi Monae NP Department: [facility]   Referring Provider: HOLLY SKINNER CSN: [id number]   Notes: Anxiety  Hair changes  Abnormal TSH

## 2021-06-22 NOTE — TELEPHONE ENCOUNTER
Appointment has been bumped off of Dr. Flower schedule for tomorrow.  Patient is wondering if Wilma would be able to consult and manage her care. Is Wilma willing to see the patient tomorrow?     Please have Wilma review and advise.

## 2021-06-22 NOTE — PATIENT INSTRUCTIONS - HE
TELOGEN EFFLUVIUM  Read about this    See Eye Doctors    Start Fluoxetine 10 mg daily for 2 weeks then 20 mg daily    See Chiropractor for Neck and Scalp

## 2021-06-22 NOTE — TELEPHONE ENCOUNTER
[place]    Review and advise on scheduling. If okay for first available or if should be seen sooner.    Ordering User: Holly Dumont MD Department: [facility]/ob   Auth Provider: HOLLY DUMONT [facility] Provider: Holly Dumont MD   Diagnosis: Anxiety  Hair changes  Abnormal TSH

## 2021-06-23 NOTE — PROGRESS NOTES
"Mental Health Visit Note    1/22/2019    Start time: 10:15 AM    Stop Time: 11:04 AM   Session # 1    Session Type: Patient is presenting for an Individual session.    Tara Sorenson is a 28 y.o. female is being seen today for    Chief Complaint   Patient presents with     Follow-up     Anxiety     relationship issues     Depression     grief/loss     OCD     compulsive behaviors   .     New symptoms or complaints: Reported no new symptoms.  She identifies her primary symptom as anxiety.  She also struggles with obsessive-compulsive thoughts and behaviors.  She plans to meet with outpatient psychiatry on 3/25/19.  She checks on her children to make sure they are breathing during the night - checking their pulses - sometimes up to 5 times/night. She stated, \"Inside my head, I'll think, something bad is going to happen if I don't get up and check on them.\"  She also taps on her car window the window either 7 or 9 times when she has a distressing thought about driving, as a way to feel better.She has been reluctant in the past to be on medications to treat symptoms - she worries that medication could interfere with her ability to take care of her children.      Functional Impairment:   Personal: 3  Family: 2  Work: 3  Social:3    Clinical assessment of mental status: The patient was 10 minutes late for her scheduled session. She brought her 5 month old baby with her today. She showed a healthy and secure attachment with her baby, attuned to the baby's cues during the session - holding her, breastfeeding her and burping her.  She was pleasant and cooperative. She was oriented x3. She made appropriate eye contact. She was adequately dressed with good grooming and hygiene. Recent and remote memories appeared intact. Concentration and focus is WNL. Speech: WNL. Psychosis is not noted, nor reported. Mood was mixed: mildly dysphoric and anxious.  Affect was euthymic.  Fund of knowledge was adequate. Thought process " "was WNL. Insight and judgment: adequate for therapy.     Suicidal/Homicidal Ideation present: None Reported This Session    Patient's impression of their current status: She stated, \"I'm just OK - I got a lot going on,\" as she elaborated on relationship dynamics with her ex-partner who remarried last August.  She reflected on how her current partner was supportive toward during this time as it was an adjustment for her and reminder of loss.  She reflected on the history of her relationship with her ex, spanning over 10 years, and her somewhat recent decision to facilitate her children visiting her ex, something that she acknowledges she does because her children enjoy the time with her ex, who functioned as a second parent for them.  She recognized a tendency to not remember the challenges of her past relationship, such as a lack of trust.  She stated, \"I have a tendency to feel what I do defines what other people do,\" as she elaborated on an overextended sense of responsibility.  She reflected on the values that are most important to her (parenting, family, personal growth, health) and the behaviors that serve them.    Therapist impression of patients current state: Patient is re-engaging in therapy.  She shows motivation to address long-standing mental health symptoms that are interfering with her functioning, prompted by addressing her son's mental health concerns.  Her thoughts, feelings and beliefs were processed and coping techniques explored.  She has good awareness of her symptoms, identifying OCD behaviors as coping strategies for anxious \"what if\" thoughts, mainly related to themes of safety and protecting her children, for instance, she checks on her children's breathing several times/night.  She also occasionally struggles with paranoid thinking/delusions, and shows good insight into this.  She appeared with a brighter affect in today's session.  Her openness to exploring a medication option to help her " alleviate her anxiety remains a positive sign, as she has been reluctant in the past. She scheduled an appointment with outpatient psychiatry at the next available appointment on 3/25/19. She is willing to explore and apply CBT and mindfulness-based models and techniques for improved symptom management.  She is insightful.     Type of psychotherapeutic technique provided: Insight oriented, Client centered, Solution-focused, CBT and ACT (Acceptance Commitment Therapy)    Progress toward short term goals:Progress as expected, patient is re-engaging in therapy.  She is receptive to strategies for improved symptom management.      Review of long term goals: Treatment Plan updated    Diagnosis:   1. Bipolar I disorder (H)    2. Generalized anxiety disorder        Plan and Follow up: Patient plans to schedule a follow up appointment with her PCP to discuss possible Genesight testing.  She plans to attend an appointment with outpatient psychiatry on 3/25/19.  She plans to practice a mindfulness breathing practice a couple times/day and practice noticing her thoughts as 'unhelpful' or helpful.  She plans to apply ACT defusion techniques in the presence of 'what if' thoughts and/or other unhelpful thoughts - especially when she has the impulse to engage in OCD-type behaviors.  Discussed keeping a journal. She plans to nurture healthy daily routines, including healthy eating, sleep hygiene, and connecting to her support system.  Plans to nurture healthy interpersonal boundaries in relationship. Plans to return for follow up in 1-2 weeks.      Discharge Criteria/Planning: Client has chronic symptoms and ongoing therapy for maintenance stability recommended.    Lilian Lozano 1/22/2019

## 2021-06-23 NOTE — PROGRESS NOTES
[facility]  ENDOCRINOLOGY    Thyroid Note  1/12/2019    Tara Sorenson, 1990, [id number]          Reason for visit      1. Hyperthyroidism        HPI     Tara Sorenson is a very pleasant 28 y.o. old female who presents for follow up.  SUMMARY:  Tara is here today for treatment of Hyperthyroidism.  She is here with her significant other, also Tara, and 3 of their 5 children.  Pt has a TSH of 0.01, and a Free T 4 of 1.4.  She is quite thin, and has been losing weight.  She is 5 months postpartum.  She also mentions that she has had hair loss recently. She is quite anxious and has a dx of Bipolar disorder, for which she is being treated with Fluoxetine and Alprazolam at present. She is currently breast feeding. She is not currently on any treatment.  She is having no problems referable to her neck. Verbalizing concerns regarding weight gain with treatment.      Past Medical History     Patient Active Problem List   Diagnosis     Methamphetamine Abuse - In Remission     Pain During Urination (Dysuria)     Bipolar Disorder     Headache     Worsening Vision Started Suddenly     Classic Migraine (With Aura) With Intractable Migraine     Carrier of group B Streptococcus     Normal delivery     Abnormal imaging of thyroid     Paresthesia     Adjustment disorder with mixed anxiety and depressed mood     Generalized anxiety disorder     ADD (attention deficit disorder)     Breast pain     Painful lactation     Cervical disc disorder     Hyperthyroidism       Family History       family history includes Migraines in her maternal grandmother, mother, and sister; Spina bifida in her maternal aunt and niece.    Social History      reports that she has quit smoking. Her smoking use included cigarettes. She smoked 0.25 packs per day. she has never used smokeless tobacco. She reports that she uses drugs. Drug: Marijuana. She reports that she does not drink alcohol.      Review of Systems     Patient  "denies fatigue, weight changes, heat/cold intolerance, bowel/skin changes or CVS symptoms.   Remainder per HPI and per attached intake form.      Vital Signs     BP 98/62 (Patient Site: Right Arm, Patient Position: Sitting, Cuff Size: Adult Regular)   Pulse 80   Ht 5' 3\" (1.6 m)   Wt 112 lb 11.2 oz (51.1 kg)   BMI 19.96 kg/m    Wt Readings from Last 3 Encounters:   01/10/19 112 lb 11.2 oz (51.1 kg)   01/03/19 115 lb (52.2 kg)   11/26/18 119 lb (54 kg)       Physical Exam     General:  Normal, NIRD,appears euthyroid  Eyes:  Pupils equal, round and reactive to light; no proptosis, lid lag or  periorbital edema.  Thyroid:  Thyroid is mildly enlarged.  No tenderness or bruit  Neck: No lymph nodes  Musculoskeletal:  Muscle strength grossly normal without evidence of wasting.  Heart:  Regular rate and rhythm without murmur.  Lungs:  Clear to auscultation.  Abdomen: Soft, non-tender, no masses or organomegaly  Neuro: Patella Reflexes were normal.No tremors  Skin:  No acanthosis nigricans or vitiligo        Assessment     1. Hyperthyroidism            Plan     We will try starting PTU as pt is currently nursing.  She is unsure whether she wants to stop doing so soon.  It has been my experience that the Methimazole is more effective, but not recommended in lactation.  I explained this to her and she verbalized understanding.  All questions answered to her satisfaction. She will start the PTU at 150 mg two times a day.  We will get f/u labs done in 2 months with f/u in 3 months. Time spent with pt today: 30 min with >50% spent in counseling and coordination of care.          [facility]  1/12/2019  12:13 PM      Lab Results     TSH   Date Value Ref Range Status   01/03/2019 0.01 (L) 0.30 - 5.00 uIU/mL Final     Thyroid Peroxidase Ab   Date Value Ref Range Status   01/03/2019 <3.0 0.0 - 5.6 IU/mL Final     Thyroid Peroxidase Ab   Date Value Ref Range Status   01/03/2019 <3.0 0.0 - 5.6 IU/mL Final "       No results found for: [id number]    Imaging Results   Last thyroid ultrasound:  Results for orders placed during the hospital encounter of 10/10/15   US Thyroid    Narrative US THYROID  10/10/2015 3:34 PM    INDICATION: Abnormal thyroid on MRI  TECHNIQUE: Routine.  COMPARISON: None.    FINDINGS:  Right thyroid lobe measures 5.5 x 1.6 x 22.1 cm. Normal echotexture with no focal nodule.      Left thyroid lobe measures 5 x 1.9 x 2 cm. Normal echotexture with no focal nodule.     Isthmus measures 3 mm. No additional findings.    No cervical lymphadenopathy.      Impression CONCLUSION:  1.  Normal thyroid ultrasound.       Last thyroid nuclear scan:  No results found for this or any previous visit.    Current Medications     Outpatient Medications Prior to Visit   Medication Sig Dispense Refill     UNABLE TO FIND Med Name: Medical canabis       acetaminophen (TYLENOL) 500 MG tablet Take 1,000 mg by mouth every 6 (six) hours as needed for pain.       ALPRAZolam (XANAX) 1 MG tablet Take 1 tablet (1 mg total) by mouth every 12 (twelve) hours as needed for sleep or anxiety (panic attack no to be used if nursing). 30 tablet 0     FLUoxetine (PROZAC) 20 MG tablet 1/2 daily for 2 weeks then 1 daily 30 tablet 2     polyvinyl alcohol (ARTIFICIAL TEARS, POLYVIN ALC,) 1.4 % ophthalmic solution 1 drop to affected eye every 4 hours while awake as needed. 15 mL 3     prenatal vit no.124-iron-folic 27 mg iron- 800 mcg Tab Take 1 tablet by mouth daily. 100 tablet 3     B complex-vitamin C-folic acid 1 mg Tab Take 1 tablet by mouth daily. 100 tablet 3     magnesium oxide 400 mg cap 1 daily 90 each 3     nipple ointment all purpose - [facility] Apply topically as needed for irritation. Apply to nipples sparingly after each feeding. Do not wash or wipe off. 30.6 g 1     No facility-administered medications prior to visit.

## 2021-06-23 NOTE — PROGRESS NOTES
Outpatient Mental Health Treatment Plan    Name:  Tara Sorenson  :  1990  MRN:  [id number]    Treatment Plan:  Initial Treatment Plan  Intake/initial treatment plan date:  19  Benefit and risks and alternatives have been discussed: Yes  Is this treatment appropriate with minimal intrusion/restrictions: Yes  Estimated duration of treatment:  Approximately 8-12 sessions.  Anticipated frequency of services:  Every 1-2 weeks  Necessity for frequency: This frequency is needed to establish therapeutic goals and for continuity of care in order to monitor progress.  Necessity for treatment: To address cognitive, behavioral, and/or emotional barriers in order to work toward goals and to improve quality of life.    Session Type: Patient is presenting for an Individual session.    Plan:       ? Other: Bipolar Disorder I most recent episode mixed   Goal: Reduce symptoms of depression and cammie (see goals for depression below)  Strategies   [x] Reduce risk taking behavior  [x] increase physical activity, i.e. Daily exercise  ?   [x] take medications as prescribed and follow up with PCP and establish care with outpatient psychiatry  [x]  Maintain functioning in community  [x] alleviate acute mood symptoms  [x] Maintain good sleep habit.  ?  ?Degree to which this is a problem: 3  Degree to which goal is met: goal in progress  Date of Review: in 3 months      ? Depression    Goal:  Decrease average depression level from 3/4 to 2/4.  Strategies:    ?[x] Decrease social isolation  [x] Increase involvement in meaningful activities  ?[x] Discuss sleep hygiene  ?[x] Explore thoughts and expectations about self and others  ?[x] Process grief (loss of significant person, independence, role, etc.)  ?[x] Assess for suicide risk  ?[x] Implement physical activity routine (with physician approval)  [x] Consider introduction of bibliotherapy and/or videos  [x] Continue compliance with medical treatment plan (or explore  "barriers)  ?  Degree to which this is a problem: 3  Degree to which goal is met: goal in progress.   Date of Review: in 3 months.  ?   ? Anxiety    Goal:  Decrease average anxiety level from 4/4 to 2/4.  Strategies: ?   [x]Learn and practice relaxation techniques and other coping strategies (e.g., thought stopping, reframing, meditation)  ? [x] Increase involvement in meaningful activities  ? [x] Discuss sleep hygiene  ? [x] Explore thoughts and expectations about self and others  ? [x] Identify and monitor triggers for panic/anxiety symptoms  ? [x] Implement physical activity routine (with physician approval)  ? [x] Consider introduction of bibliotherapy and/or videos  ? [x] Continue compliance with medical treatment plan (or explore barriers)   [x] Establish outpatient psychiatric care relationship with psychiatrist      Degree to which this is a problem: 4  Degree to which goal is met: goal in progress  Date of Review: 3 months.                           Functional Impairment:  1=Not at all/Rarely  2=Some days  3=Most Days  4=Every Day    Personal : 4  Family : 3  Social : 3   Work/school : 4    Diagnosis:    Bipolar I Disorder, Generalized Anxiety Disorder    WHODAS 2.0 12-item version 13      Scores presented in qualifiers to represent level of disability.  MODERATE Problem (medium, fair, ...) 25-49%    H1= 25  H2= 0  H3= 25    Clinical assessments and measures completed:. AURELIO-7, PHQ-9 and CAGE-AID        Strengths: Skilled , \"I'm magical with babies, I love being a parent\", responsible, thoughtful toward others.  Limitations:  \"Trust issues\", decision making in relationships, difficulty saying no to others, distractible.  Cultural Considerations: Patient grew up in [place] in an urban neighborhood.  Her father left the family when she was 3 years old, and did not return until she as 13.  Her father struggled with chemical health problems his whole life.       Persons responsible for this plan: " Patient            Psychotherapist Signature           Patient Signature:              Provider: Performed and documented by Lilian Lozano [facility]   Date:  1/22/2019

## 2021-06-23 NOTE — PROGRESS NOTES
Left a discrete voice mail on patient's cell phone with encouragement to return call to discuss rescheduling.

## 2021-06-24 NOTE — TELEPHONE ENCOUNTER
"Call from pt       Received Haldol in the ED yesterday for [facility]     Had concerns about breast feeding and called RN last night from the hospital at about 9pm      They had indicated that it was fine to breast feed (about 7-8hrs post one-time IV dose) at that time     Today she feeling nervous / anxious and had a hard time sleeping last night  Also described feeling like \"jumping out of my skin\"      Wondering if this is coming from the Haldol that she had received ?      I indicated that more commonly sleepiness / sedation are reported with this class of medication.      She does have a h/o anxiety as well which may be impacting this.  I suggested contacting her therapist to discuss further.  Deferred to previous RNs assessment re: breast feeding though up to date indicates that this is preferred agent in lactation    Dimitrios Cole, RN   Triage and Medication Refills                Reason for Disposition    Caller has medication question only, adult not sick, and triager answers question    Protocols used: MEDICATION QUESTION CALL-A-AH      "

## 2021-06-24 NOTE — TELEPHONE ENCOUNTER
RN cannot approve Refill Request    RN can NOT refill this medication medication not on med list. Last office visit: 1/3/2019 Joshua Dumont MD Last Physical: Visit date not found Last MTM visit: Visit date not found Last visit same specialty: 1/3/2019 Joshua Dumont MD.  Next visit within 3 mo: Visit date not found  Next physical within 3 mo: Visit date not found      Eladio Cavazos, [facility] Triage/Med Refill 3/6/2019    Requested Prescriptions   Pending Prescriptions Disp Refills     escitalopram oxalate (LEXAPRO) 10 MG tablet [Pharmacy Med Name: ESCITALOPRAM 10MG TABLETS] 15 tablet 0     Sig: TAKE 1/2 TABLET(5 MG) BY MOUTH DAILY    SSRI Refill Protocol  Passed - 3/3/2019  2:14 PM       Passed - PCP or prescribing provider visit in last year    Last office visit with prescriber/PCP: 1/3/2019 Joshua Dumont MD OR same dept: 1/3/2019 Joshua Dumont MD OR same specialty: 1/3/2019 Joshua Dumont MD  Last physical: Visit date not found Last MTM visit: Visit date not found   Next visit within 3 mo: Visit date not found  Next physical within 3 mo: Visit date not found  Prescriber OR PCP: Joshua Dumont MD  Last diagnosis associated with med order: There are no diagnoses linked to this encounter.  If protocol passes may refill for 12 months if within 3 months of last provider visit (or a total of 15 months).

## 2021-06-25 NOTE — TELEPHONE ENCOUNTER
RN cannot approve Refill Request    RN can NOT refill this medication med is not covered by policy/route to provider. Last office visit: Visit date not found Last Physical: Visit date not found Last MTM visit: Visit date not found Last visit same specialty: 2/9/2021 Joshua Dumont MD.  Next visit within 3 mo: Visit date not found  Next physical within 3 mo: Visit date not found      Mildred Walsh, [facility] Triage/Med Refill 5/27/2021    Requested Prescriptions   Pending Prescriptions Disp Refills     fluconazole (DIFLUCAN) 150 MG tablet [Pharmacy Med Name: FLUCONAZOLE 150MG TABLETS] 1 tablet 0     Sig: TAKE 1 TABLET BY MOUTH FOR 1 DOSE       There is no refill protocol information for this order

## 2021-06-25 NOTE — TELEPHONE ENCOUNTER
Prior Authorization Request  Who s requesting:  Pharmacy  Pharmacy Name and Location: [facility]  Medication Name: blephamide ophth suspension  Insurance Plan:   Insurance Member ID Number:  [id number]  CoverMyMeds Key: [id number]/A  Informed patient that prior authorizations can take up to 10 business days for response:   No  Okay to leave a detailed message: No

## 2021-06-25 NOTE — TELEPHONE ENCOUNTER
Does Tara even need this anymore as it has been weeks that the original Rx was written and denied. Ivan

## 2021-06-25 NOTE — TELEPHONE ENCOUNTER
Central PA team  [phone]  Pool: HE PA MED ([id number])          PA has been initiated.       PA form completed and faxed insurance via Cover My Meds     Key:   [id number]     Medication:  Sulfacetamide-prednisoLONE 10-0.2% suspension    Insurance:  [employer] [place]        Response will be received via fax and may take up to 5-10 business days depending on plan
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
## 2021-06-27 NOTE — PROGRESS NOTES
Progress Notes by Quiana Paulino CMA at 3/25/2019 10:20 AM     Author: Quiana Paulino CMA Service: Addiction Care Author Type: Certified Medical Assistant    Filed: 3/31/2019  1:27 PM Encounter Date: 3/25/2019 Status: Signed    : Quiana Paulino CMA (Certified Medical Assistant)       Patient here today to initiate psychiatric care. States depression 5/5 denies SI/HI, anxiety 5/5. States sleeps about 8 hours a night with trouble staying asleep due to bad dreams. States really bad migraine with impaired vision. States she does not taking the xanax, lexapro or the Prozac due to not wanting any side effect. States she has 4 children of her own and one more she cares for and scared to feel any side effects and not care for the children.   PHQ-20  AURELIO-20

## 2021-06-27 NOTE — PROGRESS NOTES
Progress Notes by Quiana Paulino CMA at 4/8/2019 11:40 AM     Author: Quiana Paulino CMA Service: Addiction Care Author Type: Certified Medical Assistant    Filed: 4/8/2019  1:42 PM Encounter Date: 4/8/2019 Status: Signed    : Quiana Paulino CMA (Certified Medical Assistant)       Patient here today for follow up of medication management. States depression 5/5 denies SI/HI, anxiety 5/5. States no much has changed since last visit.  PHQ-16  AURELIO-19

## 2021-06-27 NOTE — PROGRESS NOTES
Progress Notes by Quiana Paulino CMA at 5/20/2019 10:40 AM     Author: Quiana Paulino CMA Service: Addiction Care Author Type: Certified Medical Assistant    Filed: 6/23/2019 10:07 PM Encounter Date: 5/20/2019 Status: Signed    : Quiana Paulino CMA (Certified Medical Assistant)       Patient here today for follow up of medication management. States depression 5/5 denies SI/HI, anxiety 5/5. States sleeps about 7-8 hours a night with frequent wake ups also states she never feels refreshed no matter how much sleep. States she feels dragging tired all day, no drive. States she has to force herself to do things. States has not been using the medical cannabis.    PHQ-16  AURELIO-19        Correct pharmacy verified with patient and confirmed in snapshot? [x] yes []no    Charge captured ? [x] yes  [] no    Medications Phoned  to Pharmacy [] yes [x]no  Name of Pharmacist:  List Medications, including dose, quantity and instructions      Medication Prescriptions given to patient   [] yes  [x] no   List the name of the drug the prescription was written for.       Medications ordered this visit were e-scribed.  Verified by order class [x] yes  [] no  Lamictal 100 mg and 25 mg    Medication changes or discontinuations were communicated to patient's pharmacy: [] yes  [x] no    UA collected [] yes  [x] no    [place] Prescription Monitoring Program Reviewed? [x] yes  [] no    Referrals were made to: none     Future appointment was made: [x] yes  [] no    Dictation completed at time of chart check: [] yes  [x] no    I have checked the documentation for todays encounters and the above information has been reviewed and completed.

## 2021-06-27 NOTE — PROGRESS NOTES
"Progress Notes by Katarina Lema LPN at 7/22/2019 11:40 AM     Author: Katarina Lema LPN Service: -- Author Type: Licensed Nurse    Filed: 7/22/2019  1:55 PM Encounter Date: 7/22/2019 Status: Signed    : Katarina Lema LPN (Licensed Nurse)       Pt is here for psychiatric med management follow up.  HAs - \" still there\"  Anxiety and depression  - about the same  Sleep: no change           "
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
## 2021-06-28 NOTE — PROGRESS NOTES
Progress Notes by Quiana Paulino CMA at 12/9/2019 11:20 AM     Author: Quiana Paulino CMA Service: Addiction Care Author Type: Certified Medical Assistant    Filed: 12/9/2019  3:43 PM Encounter Date: 12/9/2019 Status: Addendum    : Quiana Paulino CMA (Certified Medical Assistant)    Related Notes: Original Note by Quiana Paulino CMA (Certified Medical Assistant) filed at 12/9/2019 12:59 PM       Patient here today for follow up of medication management. States depression 5/5 denies SI/HI, anxiety 5/5. States sleeps about 7 hours a night, states she wakes up a lot and plays games in the night. States she stopped the Pristiq about 4 days ago due to memory loss, headache and loss of appetite. States her primary started her on Seroquel but she does not want to take it due to not being about to wake up for her kids and she has overdosed on it in the past. States she is also afraid of Seroquel causing suicide. States her and her partner broke up. States she had to switch the kids school.  PHQ-23  AURELIO-21        Correct pharmacy verified with patient and confirmed in snapshot? [x] yes []no    Charge captured ? [x] yes  [] no    Medications Phoned  to Pharmacy [] yes [x]no  Name of Pharmacist:  List Medications, including dose, quantity and instructions      Medication Prescriptions given to patient   [] yes  [x] no   List the name of the drug the prescription was written for.       Medications ordered this visit were e-scribed.  Verified by order class [] yes  [x] no    Medication changes or discontinuations were communicated to patient's pharmacy: [x] yes  [] no  Pristiq  Seroquel    UA collected [] yes  [x] no    [place] Prescription Monitoring Program Reviewed? [x] yes  [] no    Referrals were made to:none      Future appointment was made: [x] yes  [] no    Dictation completed at time of chart check: [x] yes  [] no    I have checked the documentation for todays encounters and the  above information has been reviewed and completed.
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
## 2021-06-29 NOTE — PROGRESS NOTES
Progress Notes by Quiana Paulino CMA at 5/11/2020 12:40 PM     Author: Quiana Paulino CMA Service: Addiction Care Author Type: Certified Medical Assistant    Filed: 5/11/2020  1:34 PM Encounter Date: 5/11/2020 Status: Addendum    : Quiana Paulino CMA (Certified Medical Assistant)    Related Notes: Original Note by Quiana Paulino CMA (Certified Medical Assistant) filed at 5/11/2020  1:25 PM       This video/telephone visit will be conducted via a call between you and your physician/provider. We have found that certain health care needs can be provided without the need for an in-person physical exam. This service lets us provide the care you need with a video /telephone conversation. If a prescription is necessary we can send it directly to your pharmacy. If lab work is needed we can place an order for that and you can then stop by our lab to have the test done at a later time.   Just as we bill insurance for in-person visits, we also bill insurance for video/telephone visits. If you have questions about your insurance coverage, we recommend that you speak with your insurance company.   Patient has given verbal consent for video/Telephone visit? yes    KRISTAN/DIMITRIS HINKLE    Patient verified allergies, medications and pharmacy via phone. PHQ : and AURELIO:  done verbally with writer. Patient states she is ready for visit.  PHQ-15  AURELIO-20      ________________________________________  Medications Phoned  to Pharmacy [] yes [x]no  Name of Pharmacist:  List Medications, including dose, quantity and instructions    Medications ordered this visit were e-scribed.  Verified by order class [x] yes  [] no  Lamictal 200 mg    Medication changes or discontinuations were communicated to patient's pharmacy: [] yes  [x] no    Dictation completed at time of chart check: [x] yes  [] no    I have checked the documentation for todays encounters and the above information has been reviewed and completed.
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
## 2021-07-04 NOTE — TELEPHONE ENCOUNTER
Telephone Encounter by Arleen Story at 6/3/2021  8:27 AM     Author: Arleen Story Service: -- Author Type: --    Filed: 6/3/2021  8:29 AM Encounter Date: 5/27/2021 Status: Signed    : Arleen Story       PRIOR AUTHORIZATION DENIED    Denial Rational: sulfacetamide-prednisoLONE (BLEPHAMIDE) 10-0.2 % ophthalmic SUSPENSION is not covered sulfacetamide-prednisoLONE (BLEPHAMIDE) 10-0.2 % ophthalmic SOLUTION is covered                Appeal Information: If provider would like to appeal please provide a letter of medical necessity stating why formulary alternatives would not be clinically appropriate for the patient and route back to the PA team.

## 2021-07-31 ENCOUNTER — HEALTH MAINTENANCE LETTER (OUTPATIENT)
Age: 31
End: 2021-07-31

## 2021-08-10 DIAGNOSIS — G43.001 MIGRAINE WITHOUT AURA AND WITH STATUS MIGRAINOSUS, NOT INTRACTABLE: Primary | ICD-10-CM

## 2021-08-15 ENCOUNTER — OFFICE VISIT (OUTPATIENT)
Dept: FAMILY MEDICINE | Facility: CLINIC | Age: 31
End: 2021-08-15
Payer: COMMERCIAL

## 2021-08-15 VITALS
TEMPERATURE: 97.7 F | BODY MASS INDEX: 17.96 KG/M2 | SYSTOLIC BLOOD PRESSURE: 96 MMHG | RESPIRATION RATE: 16 BRPM | OXYGEN SATURATION: 98 % | WEIGHT: 102.2 LBS | DIASTOLIC BLOOD PRESSURE: 63 MMHG | HEART RATE: 79 BPM

## 2021-08-15 DIAGNOSIS — B37.31 CANDIDIASIS OF VAGINA: ICD-10-CM

## 2021-08-15 DIAGNOSIS — R30.9 PAIN WITH URINATION: Primary | ICD-10-CM

## 2021-08-15 LAB
ALBUMIN UR-MCNC: NEGATIVE MG/DL
APPEARANCE UR: CLEAR
BILIRUB UR QL STRIP: NEGATIVE
CLUE CELLS: ABNORMAL
COLOR UR AUTO: YELLOW
GLUCOSE UR STRIP-MCNC: NEGATIVE MG/DL
HGB UR QL STRIP: NEGATIVE
KETONES UR STRIP-MCNC: NEGATIVE MG/DL
LEUKOCYTE ESTERASE UR QL STRIP: NEGATIVE
NITRATE UR QL: NEGATIVE
PH UR STRIP: 7 [PH] (ref 5–8)
SP GR UR STRIP: 1.02 (ref 1–1.03)
TRICHOMONAS, WET PREP: ABNORMAL
UROBILINOGEN UR STRIP-ACNC: 0.2 E.U./DL
WBC'S/HIGH POWER FIELD, WET PREP: ABNORMAL
YEAST, WET PREP: PRESENT

## 2021-08-15 PROCEDURE — 81003 URINALYSIS AUTO W/O SCOPE: CPT | Performed by: FAMILY MEDICINE

## 2021-08-15 PROCEDURE — 87491 CHLMYD TRACH DNA AMP PROBE: CPT | Performed by: FAMILY MEDICINE

## 2021-08-15 PROCEDURE — 87591 N.GONORRHOEAE DNA AMP PROB: CPT | Performed by: FAMILY MEDICINE

## 2021-08-15 PROCEDURE — 99213 OFFICE O/P EST LOW 20 MIN: CPT | Performed by: FAMILY MEDICINE

## 2021-08-15 PROCEDURE — 87210 SMEAR WET MOUNT SALINE/INK: CPT | Performed by: FAMILY MEDICINE

## 2021-08-15 RX ORDER — RIZATRIPTAN BENZOATE 10 MG/1
10 TABLET ORAL
COMMUNITY
Start: 2020-11-24 | End: 2021-10-04

## 2021-08-15 RX ORDER — FLUCONAZOLE 150 MG/1
150 TABLET ORAL ONCE
Qty: 1 TABLET | Refills: 0 | Status: SHIPPED | OUTPATIENT
Start: 2021-08-15 | End: 2023-04-07

## 2021-08-15 RX ORDER — FLUTICASONE PROPIONATE 50 MCG
SPRAY, SUSPENSION (ML) NASAL
COMMUNITY
Start: 2021-04-05 | End: 2021-11-04

## 2021-08-15 RX ORDER — QUETIAPINE FUMARATE 25 MG/1
TABLET, FILM COATED ORAL
COMMUNITY
Start: 2021-04-23 | End: 2021-12-14

## 2021-08-15 RX ORDER — DEXTROAMPHETAMINE SACCHARATE, AMPHETAMINE ASPARTATE MONOHYDRATE, DEXTROAMPHETAMINE SULFATE AND AMPHETAMINE SULFATE 3.75; 3.75; 3.75; 3.75 MG/1; MG/1; MG/1; MG/1
15 CAPSULE, EXTENDED RELEASE ORAL
COMMUNITY
Start: 2021-04-13 | End: 2021-10-01

## 2021-08-15 RX ORDER — LAMOTRIGINE 200 MG/1
200 TABLET ORAL
COMMUNITY
Start: 2021-02-09 | End: 2022-02-22

## 2021-08-15 NOTE — PROGRESS NOTES
Assessment & Plan     Candidiasis of vagina    - Wet prep - Clinic Collect  - NEISSERIA GONORRHOEA PCR  - CHLAMYDIA TRACHOMATIS PCR  - fluconazole (DIFLUCAN) 150 MG tablet; Take 1 tablet (150 mg) by mouth once for 1 dose    Will treat with Diflucan x 1.  Follow-up if no change or worsening sx prn.    Pain with urination    - UA macro with reflex to Microscopic and Culture - Clinc Collect  - NEISSERIA GONORRHOEA PCR  - CHLAMYDIA TRACHOMATIS PCR    Reassured UA is negative.    Leda Feliciano MD  [facility]    Emmy Pacheco is a 31 year old who presents for the following health issues     HPI   Presents with some increased vaginal itching and spotting noted after intercourse x1  with female partner.  Has had hysterectomy.    Had episode of dark brown discharge noted in underwear- then nothing again.  She would like testing today.        Review of Systems   Constitutional, HEENT, cardiovascular, pulmonary, GI, , musculoskeletal, neuro, skin, endocrine and psych systems are negative, except as otherwise noted.      Objective    BP 96/63 (BP Location: Right arm, Patient Position: Sitting, Cuff Size: Adult Small)   Pulse 79   Temp 97.7  F (36.5  C) (Oral)   Resp 16   Wt 46.4 kg (102 lb 3.2 oz)   SpO2 98%   BMI 17.96 kg/m    Body mass index is 17.96 kg/m .  Physical Exam   GENERAL: healthy, alert and no distress  EYES: Eyes grossly normal to inspection, PERRL and conjunctivae and sclerae normal  NECK: no adenopathy, no asymmetry, masses, or scars and thyroid normal to palpation  MS: no gross musculoskeletal defects noted, no edema  SKIN: no suspicious lesions or rashes  PSYCH: mentation appears normal, affect normal/bright    Results for orders placed or performed in visit on 08/15/21 (from the past 24 hour(s))   UA macro with reflex to Microscopic and Culture - Clinc Collect    Specimen: Urine, Midstream   Result Value Ref Range    Color Urine Yellow Colorless, Straw, Light  Yellow, Yellow    Appearance Urine Clear Clear    Glucose Urine Negative Negative mg/dL    Bilirubin Urine Negative Negative    Ketones Urine Negative Negative mg/dL    Specific Gravity Urine 1.025 1.005 - 1.030    Blood Urine Negative Negative    pH Urine 7.0 5.0 - 8.0    Protein Albumin Urine Negative Negative mg/dL    Urobilinogen Urine 0.2 0.2, 1.0 E.U./dL    Nitrite Urine Negative Negative    Leukocyte Esterase Urine Negative Negative    Narrative    Microscopic not indicated   Wet prep - Clinic Collect    Specimen: Vagina; Swab   Result Value Ref Range    Trichomonas Absent Absent    Yeast Present (A) Absent    Clue Cells Absent Absent    WBCs/high power field 1+ (A) None

## 2021-08-16 LAB
C TRACH DNA SPEC QL NAA+PROBE: NEGATIVE
N GONORRHOEA DNA SPEC QL NAA+PROBE: NEGATIVE

## 2021-08-20 ENCOUNTER — TELEPHONE (OUTPATIENT)
Dept: FAMILY MEDICINE | Facility: CLINIC | Age: 31
End: 2021-08-20

## 2021-08-20 NOTE — TELEPHONE ENCOUNTER
Reason for Call:  Other call back    Detailed comments: Patient stated that she is out of her Medical cannabis and is needing a new one sent over if Joshua Dumont can get that started for her as soon as possible.     Phone Number Patient can be reached at: Home number on file [phone] (home)    Best Time: anytime.     Can we leave a detailed message on this number? YES    Call taken on 8/20/2021 at 9:34 AM by GREGORY Live

## 2021-08-22 PROBLEM — E05.00 GRAVES DISEASE: Status: ACTIVE | Noted: 2019-04-08

## 2021-08-22 PROBLEM — M50.90 CERVICAL DISC DISORDER: Status: ACTIVE | Noted: 2018-09-04

## 2021-08-22 PROBLEM — G43.001 MIGRAINE WITHOUT AURA AND WITH STATUS MIGRAINOSUS, NOT INTRACTABLE: Status: ACTIVE | Noted: 2018-01-30

## 2021-08-22 PROBLEM — G43.119 INTRACTABLE MIGRAINE WITH AURA: Status: ACTIVE | Noted: 2021-08-22

## 2021-09-16 DIAGNOSIS — F90.2 ATTENTION DEFICIT HYPERACTIVITY DISORDER (ADHD), COMBINED TYPE: Primary | ICD-10-CM

## 2021-09-16 NOTE — TELEPHONE ENCOUNTER
Reason for Call:  Medication or medication refill:    Do you use a [facility]?  Name of the pharmacy and phone number for the current request:  [facility]    Name of the medication requested: ADDERALL 15MG     Other request: Pt also still has not heard back on the medical cannibus, and needs this renewed  Pt is not seeing messages on [employer], please call her.     Can we leave a detailed message on this number? YES    Phone number patient can be reached at: Home number on file [phone] (home)    Best Time: anytime    Call taken on 9/16/2021 at 1:19 PM by Abelardo Maravilla

## 2021-09-17 RX ORDER — DEXTROAMPHETAMINE SACCHARATE, AMPHETAMINE ASPARTATE MONOHYDRATE, DEXTROAMPHETAMINE SULFATE AND AMPHETAMINE SULFATE 3.75; 3.75; 3.75; 3.75 MG/1; MG/1; MG/1; MG/1
15 CAPSULE, EXTENDED RELEASE ORAL DAILY
Qty: 30 CAPSULE | Refills: 0 | Status: CANCELLED | OUTPATIENT
Start: 2021-09-17

## 2021-09-17 NOTE — TELEPHONE ENCOUNTER
Writer called patient and left a message.  Why is there a 5 month gap in using Adderall?        Medication: Adderall XR 15 mg  Last Date Filled 4/19/21   pulled: YES

## 2021-09-22 NOTE — TELEPHONE ENCOUNTER
Tried her again, will send to Dr Dumont for OK    Katharine Thompson CMA ([facility])

## 2021-09-24 ENCOUNTER — TELEPHONE (OUTPATIENT)
Dept: FAMILY MEDICINE | Facility: CLINIC | Age: 31
End: 2021-09-24

## 2021-09-24 NOTE — TELEPHONE ENCOUNTER
Reason for Call:  Other call back    Detailed comments: pt requesting renewal of her Medical Cannabis -  Please e-mail information    Please advise    Phone Number Patient can be reached at: Cell number on file:    Telephone Information:   Mobile [phone]       Best Time: anytime    Can we leave a detailed message on this number? YES    Call taken on 9/24/2021 at 1:35 PM by Glendy Weaver

## 2021-09-26 ENCOUNTER — HEALTH MAINTENANCE LETTER (OUTPATIENT)
Age: 31
End: 2021-09-26

## 2021-10-01 RX ORDER — DEXTROAMPHETAMINE SACCHARATE, AMPHETAMINE ASPARTATE MONOHYDRATE, DEXTROAMPHETAMINE SULFATE AND AMPHETAMINE SULFATE 3.75; 3.75; 3.75; 3.75 MG/1; MG/1; MG/1; MG/1
15 CAPSULE, EXTENDED RELEASE ORAL EVERY MORNING
Qty: 30 CAPSULE | Refills: 0 | Status: SHIPPED | OUTPATIENT
Start: 2021-10-01 | End: 2021-10-04

## 2021-10-03 DIAGNOSIS — G43.001 MIGRAINE WITHOUT AURA AND WITH STATUS MIGRAINOSUS, NOT INTRACTABLE: Primary | ICD-10-CM

## 2021-10-04 ENCOUNTER — VIRTUAL VISIT (OUTPATIENT)
Dept: FAMILY MEDICINE | Facility: CLINIC | Age: 31
End: 2021-10-04
Payer: COMMERCIAL

## 2021-10-04 DIAGNOSIS — G43.119 INTRACTABLE MIGRAINE WITH AURA WITHOUT STATUS MIGRAINOSUS: ICD-10-CM

## 2021-10-04 DIAGNOSIS — F90.2 ATTENTION DEFICIT HYPERACTIVITY DISORDER (ADHD), COMBINED TYPE: ICD-10-CM

## 2021-10-04 DIAGNOSIS — F90.0 ATTENTION DEFICIT HYPERACTIVITY DISORDER (ADHD), PREDOMINANTLY INATTENTIVE TYPE: Primary | ICD-10-CM

## 2021-10-04 PROBLEM — G43.001 MIGRAINE WITHOUT AURA AND WITH STATUS MIGRAINOSUS, NOT INTRACTABLE: Status: RESOLVED | Noted: 2018-01-30 | Resolved: 2021-10-04

## 2021-10-04 PROCEDURE — 99214 OFFICE O/P EST MOD 30 MIN: CPT | Mod: 95 | Performed by: FAMILY MEDICINE

## 2021-10-04 RX ORDER — DEXTROAMPHETAMINE SACCHARATE, AMPHETAMINE ASPARTATE MONOHYDRATE, DEXTROAMPHETAMINE SULFATE AND AMPHETAMINE SULFATE 3.75; 3.75; 3.75; 3.75 MG/1; MG/1; MG/1; MG/1
15 CAPSULE, EXTENDED RELEASE ORAL EVERY MORNING
Qty: 30 CAPSULE | Refills: 0 | Status: SHIPPED | OUTPATIENT
Start: 2021-11-02 | End: 2021-12-14

## 2021-10-04 RX ORDER — RIZATRIPTAN BENZOATE 10 MG/1
TABLET ORAL
Qty: 9 TABLET | Refills: 11 | Status: SHIPPED | OUTPATIENT
Start: 2021-10-04 | End: 2021-12-14

## 2021-10-04 RX ORDER — RIZATRIPTAN BENZOATE 10 MG/1
TABLET, ORALLY DISINTEGRATING ORAL
Qty: 9 TABLET | Refills: 11 | Status: SHIPPED | OUTPATIENT
Start: 2021-10-04 | End: 2021-12-14

## 2021-10-04 NOTE — PROGRESS NOTES
"Tara is a 31 year old who is being evaluated via a billable telephone visit    How would you like to obtain your AVS? MyChart  If the video visit is dropped, the invitation should be resent by: Text to cell phone: [phone]   Will anyone else be joining your video visit? No          Subjective   Tara is a 31 year old who presents for the following health issues     HPI     Problem List Items Addressed This Visit        Nervous and Auditory    Intractable migraine with aura     Migraine   Aura    Need refill     \"doing really\"    Lamotrigine   Topamax  Med Cannabis    Still get them   Lot more now   \"I have not had Med Cannabis\"  8/10     Recertified today       Saturday   No Rizatriptan   Make me tired and work and takes headaches               Behavioral    Attention deficit hyperactivity disorder (ADHD) - Primary     Able to refill Adderall  No side effects    Due for next check in April 2021         Relevant Medications    amphetamine-dextroamphetamine (ADDERALL XR) 15 MG 24 hr capsule (Start on 11/2/2021)        Patient Instructions       Thanks for talking today    Glad to hear your migraines are under better control    Continue  Topamax  Lamictal  Medical cannabis  Rizatriptan as needed for headaches that are acute as an abortive medication    Get your Covid booster when it is available probably in December    Adderall refills done today this should be a check-in every 6 months and we did this today          Review of Systems         Objective           Vitals:  No vitals were obtained today due to virtual visit.    Physical Exam   Phone visit          Patient unable to connect by video    Telephone visit total time 15 minutes  "

## 2021-10-04 NOTE — PATIENT INSTRUCTIONS
Thanks for talking today    Glad to hear your migraines are under better control    Continue  Topamax  Lamictal  Medical cannabis  Rizatriptan as needed for headaches that are acute as an abortive medication    Get your Covid booster when it is available probably in December    Adderall refills done today this should be a check-in every 6 months and we did this today

## 2021-10-04 NOTE — ASSESSMENT & PLAN NOTE
"Migraine   Aura    Need refill     \"doing really\"    Lamotrigine   Topamax  Med Cannabis    Still get them   Lot more now   \"I have not had Med Cannabis\"  8/10     Recertified today       Saturday   No Rizatriptan   Make me tired and work and takes headaches     "

## 2021-11-04 ENCOUNTER — OFFICE VISIT (OUTPATIENT)
Dept: FAMILY MEDICINE | Facility: CLINIC | Age: 31
End: 2021-11-04
Payer: COMMERCIAL

## 2021-11-04 VITALS
WEIGHT: 98.9 LBS | DIASTOLIC BLOOD PRESSURE: 61 MMHG | OXYGEN SATURATION: 96 % | BODY MASS INDEX: 17.38 KG/M2 | TEMPERATURE: 98.4 F | RESPIRATION RATE: 18 BRPM | SYSTOLIC BLOOD PRESSURE: 94 MMHG | HEART RATE: 93 BPM

## 2021-11-04 DIAGNOSIS — R05.9 COUGH: ICD-10-CM

## 2021-11-04 DIAGNOSIS — J02.9 SORE THROAT: Primary | ICD-10-CM

## 2021-11-04 LAB — DEPRECATED S PYO AG THROAT QL EIA: NEGATIVE

## 2021-11-04 PROCEDURE — U0003 INFECTIOUS AGENT DETECTION BY NUCLEIC ACID (DNA OR RNA); SEVERE ACUTE RESPIRATORY SYNDROME CORONAVIRUS 2 (SARS-COV-2) (CORONAVIRUS DISEASE [COVID-19]), AMPLIFIED PROBE TECHNIQUE, MAKING USE OF HIGH THROUGHPUT TECHNOLOGIES AS DESCRIBED BY CMS-2020-01-R: HCPCS | Mod: 90 | Performed by: FAMILY MEDICINE

## 2021-11-04 PROCEDURE — 99213 OFFICE O/P EST LOW 20 MIN: CPT | Mod: CS | Performed by: FAMILY MEDICINE

## 2021-11-04 PROCEDURE — 99000 SPECIMEN HANDLING OFFICE-LAB: CPT | Performed by: FAMILY MEDICINE

## 2021-11-04 PROCEDURE — 87651 STREP A DNA AMP PROBE: CPT | Performed by: FAMILY MEDICINE

## 2021-11-04 PROCEDURE — U0005 INFEC AGEN DETEC AMPLI PROBE: HCPCS | Mod: 90 | Performed by: FAMILY MEDICINE

## 2021-11-04 NOTE — LETTER
November 4, 2021      Tara Sorenson  [address] [place] [address]        To Whom It May Concern:    Tara Sorenson  was seen on November 4, 2021.  Please excuse her  until she is feeling better due to illness.  It may be a few days.        Sincerely,        Mago Rudolph MD

## 2021-11-05 LAB — GROUP A STREP BY PCR: NOT DETECTED

## 2021-11-05 NOTE — PATIENT INSTRUCTIONS
Tylenol and ibuprofen as needed  Teaspoonful of honey as needed for cough  Plenty of rest  Increase fluid intake  Stay home from work until you are at least 24 hours fever free and feeling better  Stay isolated at home until Covid results are available

## 2021-11-06 LAB — SARS-COV-2 RNA RESP QL NAA+PROBE: NOT DETECTED

## 2021-11-24 DIAGNOSIS — R51.9 NONINTRACTABLE HEADACHE, UNSPECIFIED CHRONICITY PATTERN, UNSPECIFIED HEADACHE TYPE: ICD-10-CM

## 2021-11-24 DIAGNOSIS — G43.001 MIGRAINE WITHOUT AURA AND WITH STATUS MIGRAINOSUS, NOT INTRACTABLE: Primary | ICD-10-CM

## 2021-11-26 RX ORDER — IBUPROFEN 600 MG/1
600 TABLET, FILM COATED ORAL EVERY 8 HOURS PRN
Qty: 30 TABLET | Refills: 0 | Status: SHIPPED | OUTPATIENT
Start: 2021-11-26 | End: 2021-12-14

## 2021-11-27 NOTE — TELEPHONE ENCOUNTER
Reveiwed chart included Mychart note and [facility] atabase.  No PRESCRIPTION for ibuprofen since 2019    Was seen in for strep throat recetly, advised to take ibuprofen.    I will give a short refill only

## 2021-12-14 DIAGNOSIS — F51.01 PRIMARY INSOMNIA: Primary | ICD-10-CM

## 2021-12-14 DIAGNOSIS — F90.2 ATTENTION DEFICIT HYPERACTIVITY DISORDER (ADHD), COMBINED TYPE: ICD-10-CM

## 2021-12-14 DIAGNOSIS — R51.9 NONINTRACTABLE HEADACHE, UNSPECIFIED CHRONICITY PATTERN, UNSPECIFIED HEADACHE TYPE: ICD-10-CM

## 2021-12-14 RX ORDER — QUETIAPINE FUMARATE 25 MG/1
TABLET, FILM COATED ORAL
Qty: 90 TABLET | Refills: 1 | Status: SHIPPED | OUTPATIENT
Start: 2021-12-14 | End: 2022-03-17

## 2021-12-14 RX ORDER — IBUPROFEN 600 MG/1
600 TABLET, FILM COATED ORAL EVERY 8 HOURS PRN
Qty: 30 TABLET | Refills: 1 | Status: SHIPPED | OUTPATIENT
Start: 2021-12-14 | End: 2021-12-27

## 2021-12-14 RX ORDER — DEXTROAMPHETAMINE SACCHARATE, AMPHETAMINE ASPARTATE MONOHYDRATE, DEXTROAMPHETAMINE SULFATE AND AMPHETAMINE SULFATE 3.75; 3.75; 3.75; 3.75 MG/1; MG/1; MG/1; MG/1
15 CAPSULE, EXTENDED RELEASE ORAL EVERY MORNING
Qty: 90 CAPSULE | Refills: 0 | Status: SHIPPED | OUTPATIENT
Start: 2021-12-14 | End: 2021-12-27

## 2021-12-20 ENCOUNTER — VIRTUAL VISIT (OUTPATIENT)
Dept: FAMILY MEDICINE | Facility: CLINIC | Age: 31
End: 2021-12-20
Payer: COMMERCIAL

## 2021-12-20 DIAGNOSIS — G43.001 MIGRAINE WITHOUT AURA AND WITH STATUS MIGRAINOSUS, NOT INTRACTABLE: ICD-10-CM

## 2021-12-20 PROCEDURE — 99207 PR DROP WITH A PROCEDURE: CPT | Performed by: FAMILY MEDICINE

## 2021-12-20 RX ORDER — TOPIRAMATE 25 MG/1
25 TABLET, FILM COATED ORAL DAILY
Qty: 30 TABLET | Refills: 1 | Status: SHIPPED | OUTPATIENT
Start: 2021-12-20 | End: 2022-03-02

## 2021-12-20 ASSESSMENT — PATIENT HEALTH QUESTIONNAIRE - PHQ9: SUM OF ALL RESPONSES TO PHQ QUESTIONS 1-9: 9

## 2021-12-20 NOTE — PROGRESS NOTES
Tara is a 31 year old who is being evaluated via a billable video visit.      How would you like to obtain your AVS? MyChart  If the video visit is dropped, the invitation should be resent by: Text to cell phone: [phone]  Will anyone else be joining your video visit? No    Video Start Time:   Phone message left  [phone]  Text message sent for [employer] visit  Patient did not respond          Subjective   Tara is a 31 year old who presents for the following health issues alone      HPI       Review of Systems         Objective           Vitals:  No vitals were obtained today due to virtual visit.    Physical Exam           Video-Visit Details    Type of service:  Video Visit    Video End Time:not connected     Originating Location (pt. Location): Home    Distant Location (provider location):  [facility]     Platform used for Video Visit: Planbox

## 2021-12-27 ENCOUNTER — VIRTUAL VISIT (OUTPATIENT)
Dept: FAMILY MEDICINE | Facility: CLINIC | Age: 31
End: 2021-12-27
Payer: COMMERCIAL

## 2021-12-27 DIAGNOSIS — G43.119 INTRACTABLE MIGRAINE WITH AURA WITHOUT STATUS MIGRAINOSUS: ICD-10-CM

## 2021-12-27 DIAGNOSIS — F90.2 ATTENTION DEFICIT HYPERACTIVITY DISORDER (ADHD), COMBINED TYPE: Primary | ICD-10-CM

## 2021-12-27 DIAGNOSIS — R51.9 NONINTRACTABLE HEADACHE, UNSPECIFIED CHRONICITY PATTERN, UNSPECIFIED HEADACHE TYPE: ICD-10-CM

## 2021-12-27 PROCEDURE — 99214 OFFICE O/P EST MOD 30 MIN: CPT | Mod: 95 | Performed by: FAMILY MEDICINE

## 2021-12-27 RX ORDER — DEXTROAMPHETAMINE SACCHARATE, AMPHETAMINE ASPARTATE MONOHYDRATE, DEXTROAMPHETAMINE SULFATE AND AMPHETAMINE SULFATE 5; 5; 5; 5 MG/1; MG/1; MG/1; MG/1
40 CAPSULE, EXTENDED RELEASE ORAL DAILY
Qty: 60 CAPSULE | Refills: 0 | Status: SHIPPED | OUTPATIENT
Start: 2021-12-27 | End: 2022-03-02

## 2021-12-27 RX ORDER — IBUPROFEN 600 MG/1
600 TABLET, FILM COATED ORAL EVERY 8 HOURS PRN
Qty: 60 TABLET | Refills: 1 | Status: SHIPPED | OUTPATIENT
Start: 2021-12-27 | End: 2022-02-11

## 2021-12-27 RX ORDER — DEXTROAMPHETAMINE SACCHARATE, AMPHETAMINE ASPARTATE MONOHYDRATE, DEXTROAMPHETAMINE SULFATE AND AMPHETAMINE SULFATE 5; 5; 5; 5 MG/1; MG/1; MG/1; MG/1
40 CAPSULE, EXTENDED RELEASE ORAL DAILY
Qty: 60 CAPSULE | Refills: 0 | Status: SHIPPED | OUTPATIENT
Start: 2022-01-25 | End: 2022-06-20

## 2021-12-27 ASSESSMENT — ANXIETY QUESTIONNAIRES
IF YOU CHECKED OFF ANY PROBLEMS ON THIS QUESTIONNAIRE, HOW DIFFICULT HAVE THESE PROBLEMS MADE IT FOR YOU TO DO YOUR WORK, TAKE CARE OF THINGS AT HOME, OR GET ALONG WITH OTHER PEOPLE: SOMEWHAT DIFFICULT
1. FEELING NERVOUS, ANXIOUS, OR ON EDGE: SEVERAL DAYS
3. WORRYING TOO MUCH ABOUT DIFFERENT THINGS: NEARLY EVERY DAY
5. BEING SO RESTLESS THAT IT IS HARD TO SIT STILL: SEVERAL DAYS
6. BECOMING EASILY ANNOYED OR IRRITABLE: NOT AT ALL
GAD7 TOTAL SCORE: 14
2. NOT BEING ABLE TO STOP OR CONTROL WORRYING: NEARLY EVERY DAY
7. FEELING AFRAID AS IF SOMETHING AWFUL MIGHT HAPPEN: NEARLY EVERY DAY

## 2021-12-27 ASSESSMENT — PATIENT HEALTH QUESTIONNAIRE - PHQ9
SUM OF ALL RESPONSES TO PHQ QUESTIONS 1-9: 5
5. POOR APPETITE OR OVEREATING: NEARLY EVERY DAY

## 2021-12-27 NOTE — ASSESSMENT & PLAN NOTE
"Adderall 15 mg Daily  \"wears off quickly\"  Few hours after take   \"it is gone\"    Seroquel >> sleep fine     Last Until   8 AM >> Noon \"effects gone\"    Topamax  25 mg 1 night   Lamotrigine 200 mg Daily       Adderall  20 mg XR Twice daily         "

## 2021-12-27 NOTE — PROGRESS NOTES
"Tara is a 31 year old who is being evaluated via a billable Telephone visit.      How would you like to obtain your AVS? MyChart  If the video visit is dropped, the invitation should be resent by: Text to cell phone: [phone]  Will anyone else be joining your video visit? No        Video Start Time: 7:40 AM  >> dropped at 7:42     Total time Telephone 13 minutes         Assessment & Plan   Problem List Items Addressed This Visit     Attention deficit hyperactivity disorder (ADHD) - Primary     Adderall 15 mg Daily  \"wears off quickly\"  Few hours after take   \"it is gone\"    Seroquel >> sleep fine     Last Until   8 AM >> Noon \"effects gone\"    Topamax  25 mg 1 night   Lamotrigine 200 mg Daily       Adderall  20 mg XR BID              Relevant Medications    amphetamine-dextroamphetamine (ADDERALL XR) 20 MG 24 hr capsule    amphetamine-dextroamphetamine (ADDERALL XR) 20 MG 24 hr capsule (Start on 1/25/2022)    Intractable migraine with aura     Ibuprofen 600 mg  in addition to       Lamictal 200 mg Daily  Topamax 25 mg HS  B6 25 mg daily     Avoiding   Coffee  Juice   Hydrate Water  Do not over-caffeinate     Occasional Vision \"lost' with Headache  It comes back  Happens occasional              Relevant Medications    ibuprofen (ADVIL/MOTRIN) 600 MG tablet      Other Visit Diagnoses     Nonintractable headache, unspecified chronicity pattern, unspecified headache type        Relevant Medications    ibuprofen (ADVIL/MOTRIN) 600 MG tablet         There are no Patient Instructions on file for this visit.        Tobacco Cessation:   reports that she has been smoking cigarettes, cigarettes, and cigarettes. She has been smoking about 0.25 packs per day. She has never used smokeless tobacco.  ETQ         No follow-ups on file.    Joshua Dumont MD  [facility]    Subjective   Tara is a 31 year old who presents for the following health issues Self   HPI         Review of Systems      "    Objective           Vitals:  No vitals were obtained today due to virtual visit.    Physical Exam               Video-Visit Details    Type of service:  Telephone Visit

## 2021-12-27 NOTE — ASSESSMENT & PLAN NOTE
"Ibuprofen 600 mg  in addition to       Lamictal 200 mg Daily  Topamax 25 mg HS  B6 25 mg daily     Avoiding   Coffee  Juice   Hydrate Water  Do not over-caffeinate     Occasional Vision \"lost' with Headache  It comes back  Happens occasional       "

## 2021-12-28 ASSESSMENT — ANXIETY QUESTIONNAIRES: GAD7 TOTAL SCORE: 14

## 2022-01-03 NOTE — PATIENT INSTRUCTIONS
Thanks for checking in     Continue other therapies Headache   Change to Adderrall XR  40 mg Total if insurance allows

## 2022-01-07 ENCOUNTER — TELEPHONE (OUTPATIENT)
Dept: FAMILY MEDICINE | Facility: CLINIC | Age: 32
End: 2022-01-07
Payer: COMMERCIAL

## 2022-01-07 NOTE — TELEPHONE ENCOUNTER
PA needed for the following medication    Adderall XR 20 MG Capsules  [facility] pharmacy    Shira Root LPN

## 2022-01-10 NOTE — TELEPHONE ENCOUNTER
Central Prior Authorization Team   Phone: [phone]    PA Initiation    Medication: Adderall XR 20MG er capsules  Insurance Company: [employer] - Phone [phone] Fax [phone]  Pharmacy Filling the Rx: [id number] [place] [address] AT [place] [address]  Filling Pharmacy Phone: [phone]  Filling Pharmacy Fax:    Start Date: 1/10/2022

## 2022-01-13 NOTE — TELEPHONE ENCOUNTER
Prior Authorization Approval    Authorization Effective Date: 1/1/2022  Authorization Expiration Date: 1/10/2023  Medication: Adderall XR 20MG er capsules  Approved Dose/Quantity:    Reference #:     Insurance Company: [employer] [place] - Phone [phone] Fax [phone]  Expected CoPay:       CoPay Card Available:      Foundation Assistance Needed:    Which Pharmacy is filling the prescription (Not needed for infusion/clinic administered): [facility] [address] [place] [address] [address] AT [place] [address]  Pharmacy Notified: Yes  Patient Notified: Yes

## 2022-02-22 DIAGNOSIS — F31.9 BIPOLAR AFFECTIVE DISORDER, REMISSION STATUS UNSPECIFIED (H): Primary | ICD-10-CM

## 2022-02-23 NOTE — TELEPHONE ENCOUNTER
"Disp Refills Start End ZAYRA    lamoTRIgine (LAMICTAL) 200 MG tablet 90 tablet 3 2/9/2021  No   Sig - Route: Take 1 tablet (200 mg total) by mouth daily. - Oral   Sent to pharmacy as: lamoTRIgine 200 mg tablet (LaMICtal)   Notes to Pharmacy: Please allow one year refills DanL2/9/2021   E-Prescribing Status: Receipt confirmed by pharmacy (2/9/2021  2:32 PM CST)       lamoTRIgine (LAMICTAL) 200 MG tablet [[id number]]    Electronically signed by: Joshua Dumont MD on 02/09/21 1432 Status: Active   Ordering user: Joshua Dumont MD 02/09/21 1432 Authorized by: Joshua Dumont MD   Frequency: DAILY 02/09/21 - Until Discontinued   Diagnoses  Bipolar 2 disorder, major depressive episode (H) [F31.81]     Routing refill request to provider for review/approval because:  Requires review  Medication comments: Please allow one year refills DanL2/9/2021     Last office visit provider:  12/27/21     Requested Prescriptions   Pending Prescriptions Disp Refills     lamoTRIgine (LAMICTAL) 200 MG tablet       Sig: Take 1 tablet (200 mg) by mouth       Anti-Seizure Meds Protocol  Failed - 2/22/2022  2:13 PM        Failed - Review Authorizing provider's last note.      Refer to last progress notes: confirm request is for original authorizing provider (cannot be through other providers).          Passed - Recent (12 mo) or future (30 days) visit within the authorizing provider's specialty     Patient has had an office visit with the authorizing provider or a provider within the authorizing providers department within the previous 12 mos or has a future within next 30 days. See \"Patient Info\" tab in inbasket, or \"Choose Columns\" in Meds & Orders section of the refill encounter.              Passed - Normal CBC on file in past 26 months     Recent Labs   Lab Test 02/09/21  1456   WBC 5.9   RBC 4.18   HGB 13.1   HCT 39.6                    Passed - Normal ALT or AST on file in past 26 months     Recent Labs   Lab Test " 02/09/21  1456   ALT 12     Recent Labs   Lab Test 02/09/21  1456   AST 19             Passed - Normal platelet count on file in past 26 months     Recent Labs   Lab Test 02/09/21  1456                  Passed - Medication is active on med list        Passed - No active pregnancy on record        Passed - No positive pregnancy test in last 12 months             Laura Loza RN 02/23/22 10:29 AM

## 2022-02-24 RX ORDER — LAMOTRIGINE 200 MG/1
200 TABLET ORAL DAILY
Qty: 90 TABLET | Refills: 3 | Status: SHIPPED | OUTPATIENT
Start: 2022-02-24 | End: 2022-06-20

## 2022-03-02 ENCOUNTER — MYC REFILL (OUTPATIENT)
Dept: FAMILY MEDICINE | Facility: CLINIC | Age: 32
End: 2022-03-02
Payer: COMMERCIAL

## 2022-03-02 DIAGNOSIS — G43.001 MIGRAINE WITHOUT AURA AND WITH STATUS MIGRAINOSUS, NOT INTRACTABLE: ICD-10-CM

## 2022-03-02 DIAGNOSIS — F90.2 ATTENTION DEFICIT HYPERACTIVITY DISORDER (ADHD), COMBINED TYPE: ICD-10-CM

## 2022-03-04 RX ORDER — TOPIRAMATE 25 MG/1
25 TABLET, FILM COATED ORAL DAILY
Qty: 90 TABLET | Refills: 0 | Status: SHIPPED | OUTPATIENT
Start: 2022-03-04 | End: 2022-06-09

## 2022-03-04 NOTE — TELEPHONE ENCOUNTER
Medication: Adderall XR       CSA in last year: NO    Random Utox in last year: NO     Use of Adderall was last discussed with Dr. Dumont on 12/27/21.                PROVIDER TO PULL THE FOLLOWING FROM  :    1. Last date filled?  2.   Only PCP Prescribing?  3.   Taken as prescribed from physician notes?

## 2022-03-04 NOTE — TELEPHONE ENCOUNTER
Routing refill request to provider for review/approval because:  Drug not on the [facility] refill protocol -controlled substance refill    Last Written Prescription Date:  12/27/21  Last Fill Quantity: 60,  # refills: 0   Last office visit provider:  12/27/21     Requested Prescriptions   Pending Prescriptions Disp Refills     amphetamine-dextroamphetamine (ADDERALL XR) 20 MG 24 hr capsule 60 capsule 0     Sig: Take 2 capsules (40 mg) by mouth daily       There is no refill protocol information for this order          Tiera Wise 03/04/22 10:28 AM

## 2022-03-05 RX ORDER — DEXTROAMPHETAMINE SACCHARATE, AMPHETAMINE ASPARTATE MONOHYDRATE, DEXTROAMPHETAMINE SULFATE AND AMPHETAMINE SULFATE 5; 5; 5; 5 MG/1; MG/1; MG/1; MG/1
40 CAPSULE, EXTENDED RELEASE ORAL DAILY
Qty: 60 CAPSULE | Refills: 0 | Status: SHIPPED | OUTPATIENT
Start: 2022-03-05 | End: 2022-04-22

## 2022-03-17 DIAGNOSIS — F51.01 PRIMARY INSOMNIA: ICD-10-CM

## 2022-03-17 RX ORDER — QUETIAPINE FUMARATE 25 MG/1
TABLET, FILM COATED ORAL
Qty: 90 TABLET | Refills: 1 | Status: SHIPPED | OUTPATIENT
Start: 2022-03-17 | End: 2023-02-10

## 2022-03-17 NOTE — TELEPHONE ENCOUNTER
Refill Request  Medication name: Pending Prescriptions:                       Disp   Refills    QUEtiapine (SEROQUEL) 25 MG tablet        90 tab*1            Si/2 to 2 po HS for insomnia    Who prescribed the medication: PCp  Last refill on medication: 21  Requested Pharmacy: [facility]  Last appointment with PCP: 21  Next appointment: Not due

## 2022-03-17 NOTE — TELEPHONE ENCOUNTER
"Routing refill request to provider for review/approval because:  Labs not current:  A1c, CBC, Lipid    Last Written Prescription Date:  2021  Last Fill Quantity: 90,  # refills: 1   Last office visit provider:  2021     Requested Prescriptions   Pending Prescriptions Disp Refills     QUEtiapine (SEROQUEL) 25 MG tablet 90 tablet 1     Si/2 to 2 po HS for insomnia       Antipsychotic Medications Failed - 3/17/2022  9:12 AM        Failed - Lipid panel on file within the past 12 months     No lab results found.            Failed - CBC on file in past 12 months     Recent Labs   Lab Test 21  1456   WBC 5.9   RBC 4.18   HGB 13.1   HCT 39.6                    Failed - A1c or Glucose on file in past 12 months     Recent Labs   Lab Test 21  1456   GLC 80       Please review patients last 3 weights. If a weight gain of >10 lbs exists, you may refill the prescription once after instructing the patient to schedule an appointment within the next 30 days.    Wt Readings from Last 3 Encounters:   21 44.9 kg (98 lb 14.4 oz)   21 46.6 kg (102 lb 12.8 oz)   08/15/21 46.4 kg (102 lb 3.2 oz)             Passed - Blood pressure under 140/90 in past 12 months     BP Readings from Last 3 Encounters:   21 94/61   21 98/66   08/15/21 96/63                 Passed - Patient is 12 years of age or older        Passed - Heart Rate on file within past 12 months     Pulse Readings from Last 3 Encounters:   21 93   21 77   08/15/21 79               Passed - Medication is active on med list        Passed - Patient is not pregnant        Passed - No positve pregnancy test on file in past 12 months        Passed - Recent (6 mo) or future (30 days) visit within the authorizing provider's specialty     Patient had office visit in the last 6 months or has a visit in the next 30 days with authorizing provider or within the authorizing provider's specialty.  See \"Patient Info\" tab in " "inbasket, or \"Choose Columns\" in Meds & Orders section of the refill encounter.                 Lena Aviles RN 03/17/22 11:36 AM  "

## 2022-03-23 NOTE — TELEPHONE ENCOUNTER
RN cannot approve Refill Request    RN can NOT refill this medication med is not covered by policy/route to provider     . Last office visit: 12/6/2019 Joshua Dumont MD Last Physical: 4/8/2019 Last MTM visit: Visit date not found Last visit same specialty: 12/6/2019 Joshua Dumont MD.  Next visit within 3 mo: Visit date not found  Next physical within 3 mo: Visit date not found      Leydi Diaz, Care Connection Triage/Med Refill 3/5/2020    Requested Prescriptions   Pending Prescriptions Disp Refills     methIMAzole (TAPAZOLE) 5 MG tablet [Pharmacy Med Name: METHIMAZOLE 5MG TABLETS] 60 tablet 0     Sig: TAKE 1 TABLET BY MOUTH TWICE DAILY       There is no refill protocol information for this order            Patent

## 2022-04-07 ENCOUNTER — PATIENT OUTREACH (OUTPATIENT)
Dept: CARE COORDINATION | Facility: CLINIC | Age: 32
End: 2022-04-07
Payer: COMMERCIAL

## 2022-04-07 DIAGNOSIS — F31.9 BIPOLAR DISORDER (H): Primary | ICD-10-CM

## 2022-04-07 NOTE — PROGRESS NOTES
Clinic Care Coordination Contact  [facility]/Voicemail       Clinical Data: Care Coordinator Outreach  Outreach attempted x 2.  Left message on patient's voicemail with call back information and requested return call.  Plan: Care Coordinator will send care coordination introduction letter with care coordinator contact information and explanation of care coordination services via [employer]. Care Coordinator will do no further outreaches at this time.    Clinic Care Coordination Contact  [facility]/Voicemail       Clinical Data: Care Coordinator Outreach  Outreach attempted x 1.  Left message on patient's voicemail with call back information and requested return call.   Care Coordinator will try to reach patient again in 1-2 business days.  4/8

## 2022-04-07 NOTE — LETTER
[facility] CARE COORDINATION  [address] [place] [address]    April 8, 2022    Tara Sorenson  [address] [place] [address]      Dear Tara,    I am a clinic community health worker who works with Joshua Dumont MD at [facility]. I have been trying to reach you recently to introduce Clinic Care Coordination and to see if there was anything I could assist you with.  Below is a description of clinic care coordination and how I can further assist you.      The clinic care coordination team is made up of a registered nurse,  and community health worker who understand the health care system. The goal of clinic care coordination is to help you manage your health and improve access to the health care system in the most efficient manner. The team can assist you in meeting your health care goals by providing education, coordinating services, strengthening the communication among your providers and supporting you with any resource needs.    Please feel free to contact me at [phone] with any questions or concerns. We are focused on providing you with the highest-quality healthcare experience possible and that all starts with you.     Sincerely,     Dulce MCCOY  Community Health Worker  [phone]

## 2022-04-11 ENCOUNTER — HOSPITAL ENCOUNTER (EMERGENCY)
Facility: CLINIC | Age: 32
Discharge: HOME | End: 2022-04-11
Attending: EMERGENCY MEDICINE | Admitting: EMERGENCY MEDICINE
Payer: COMMERCIAL

## 2022-04-11 VITALS
TEMPERATURE: 98.1 F | SYSTOLIC BLOOD PRESSURE: 115 MMHG | OXYGEN SATURATION: 100 % | BODY MASS INDEX: 16.66 KG/M2 | HEIGHT: 63 IN | HEART RATE: 87 BPM | WEIGHT: 94 LBS | DIASTOLIC BLOOD PRESSURE: 77 MMHG | RESPIRATION RATE: 16 BRPM

## 2022-04-11 DIAGNOSIS — B35.1 ONYCHOMYCOSIS: ICD-10-CM

## 2022-04-11 PROCEDURE — 99282 EMERGENCY DEPT VISIT SF MDM: CPT

## 2022-04-11 RX ORDER — TERBINAFINE HYDROCHLORIDE 250 MG/1
250 TABLET ORAL DAILY
Qty: 77 TABLET | Refills: 0 | Status: SHIPPED | OUTPATIENT
Start: 2022-04-11 | End: 2022-06-20
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
## 2022-04-22 DIAGNOSIS — F90.2 ATTENTION DEFICIT HYPERACTIVITY DISORDER (ADHD), COMBINED TYPE: ICD-10-CM

## 2022-04-22 RX ORDER — DEXTROAMPHETAMINE SACCHARATE, AMPHETAMINE ASPARTATE MONOHYDRATE, DEXTROAMPHETAMINE SULFATE AND AMPHETAMINE SULFATE 5; 5; 5; 5 MG/1; MG/1; MG/1; MG/1
40 CAPSULE, EXTENDED RELEASE ORAL DAILY
Qty: 60 CAPSULE | Refills: 0 | Status: SHIPPED | OUTPATIENT
Start: 2022-04-22 | End: 2022-05-31

## 2022-04-22 NOTE — TELEPHONE ENCOUNTER
Patient calling to get a medication refill on medication attached.    Controlled Substance Refill Request for   amphetamine-dextroamphetamine (ADDERALL XR) 20 MG 24 hr capsule    Last refill: 3/5/2022    Last clinic visit: 12/20/2021    Clinic visit frequency required:   Next appt: No FUTURE APPT

## 2022-04-27 NOTE — TELEPHONE ENCOUNTER
"Last Written Prescription Date:  12/20/2021  Last Fill Quantity: 30,  # refills: 1   Last office visit provider:  12/27/2021 with Dr Dumont.     Requested Prescriptions   Pending Prescriptions Disp Refills     topiramate (TOPAMAX) 25 MG tablet 90 tablet 1     Sig: Take 1 tablet (25 mg) by mouth daily       Anti-Seizure Meds Protocol  Failed - 3/2/2022 10:53 AM        Failed - Review Authorizing provider's last note.      Refer to last progress notes: confirm request is for original authorizing provider (cannot be through other providers).          Passed - Recent (12 mo) or future (30 days) visit within the authorizing provider's specialty     Patient has had an office visit with the authorizing provider or a provider within the authorizing providers department within the previous 12 mos or has a future within next 30 days. See \"Patient Info\" tab in inbasket, or \"Choose Columns\" in Meds & Orders section of the refill encounter.              Passed - Normal CBC on file in past 26 months     Recent Labs   Lab Test 02/09/21  1456   WBC 5.9   RBC 4.18   HGB 13.1   HCT 39.6                    Passed - Normal ALT or AST on file in past 26 months     Recent Labs   Lab Test 02/09/21  1456   ALT 12     Recent Labs   Lab Test 02/09/21  1456   AST 19             Passed - Normal platelet count on file in past 26 months     Recent Labs   Lab Test 02/09/21  1456                  Passed - Medication is active on med list        Passed - No active pregnancy on record        Passed - No positive pregnancy test in last 12 months             Mindy Joseph 03/04/22 9:44 AM  " [FreeTextEntry1] : Congenital heart disease\par 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

## 2022-05-29 ENCOUNTER — NURSE TRIAGE (OUTPATIENT)
Dept: NURSING | Facility: CLINIC | Age: 32
End: 2022-05-29
Payer: COMMERCIAL

## 2022-05-29 DIAGNOSIS — F90.2 ATTENTION DEFICIT HYPERACTIVITY DISORDER (ADHD), COMBINED TYPE: ICD-10-CM

## 2022-05-29 NOTE — TELEPHONE ENCOUNTER
Triage call:     Medication issues calling about adderall XR - states she needs a refill - writer advised that these medications can only be filled during clinic hours- pended refill in separate encounter     Patient states that she has having a hard time remembering to call in each month for her addreall script  Patient Is wondering if PCP can send multiple months for her to her pharmacy so it is ready when she runs out to .    - writer did advised that since this medication that may not be possible but would send a message per patient request to see if there is anything PCP can do to help.     Ayanna Strange RN BSN 5/29/2022 2:36 PM             Reason for Disposition    Caller requesting a CONTROLLED substance prescription refill (e.g., narcotics, ADHD medicines)    Additional Information    Negative: Drug overdose and triager unable to answer question    Negative: Caller requesting information unrelated to medicine    Negative: Caller requesting a prescription for Strep throat and has a positive culture result    Negative: Rash while taking a medication or within 3 days of stopping it    Negative: Immunization reaction suspected    Negative: [1] Asthma and [2] having symptoms of asthma (cough, wheezing, etc.)    Negative: [1] Influenza symptoms AND [2] anti-viral med prescription request, such as Tamiflu    Negative: [1] Symptom of illness (e.g., headache, abdominal pain, earache, vomiting) AND [2] more than mild    Negative: MORE THAN A DOUBLE DOSE of a prescription or over-the-counter (OTC) drug    Negative: [1] DOUBLE DOSE (an extra dose or lesser amount) of over-the-counter (OTC) drug AND [2] any symptoms (e.g., dizziness, nausea, pain, sleepiness)    Negative: [1] DOUBLE DOSE (an extra dose or lesser amount) of prescription drug AND [2] any symptoms (e.g., dizziness, nausea, pain, sleepiness)    Negative: Took another person's prescription drug    Negative: [1] DOUBLE DOSE (an extra dose or lesser  "amount) of prescription drug AND [2] NO symptoms (Exception: a double dose of antibiotics)    Negative: Diabetes drug error or overdose (e.g., took wrong type of insulin or took extra dose)    Negative: [1] Request for URGENT new prescription or refill of \"essential\" medication (i.e., likelihood of harm to patient if not taken) AND [2] triager unable to fill per unit policy    Negative: [1] Prescription not at pharmacy AND [2] was prescribed by PCP recently    Negative: [1] Pharmacy calling with prescription questions AND [2] triager unable to answer question    Negative: [1] Caller has URGENT medication question about med that PCP or specialist prescribed AND [2] triager unable to answer question    Negative: [1] Caller has NON-URGENT medication question about med that PCP prescribed AND [2] triager unable to answer question    Negative: [1] Caller requesting a NON-URGENT new prescription or refill AND [2] triager unable to refill per unit policy    Negative: [1] Caller has medication question about med not prescribed by PCP AND [2] triager unable to answer question (e.g., compatibility with other med, storage)    Protocols used: MEDICATION QUESTION CALL-A-AH      "

## 2022-05-29 NOTE — TELEPHONE ENCOUNTER
Routing refill request to provider for review/approval because:  Controlled substance request    Last Written Prescription Date:  4/22/22  Last Fill Quantity: 60,  # refills: 0   Last office visit provider:  12/27/21     Requested Prescriptions   Pending Prescriptions Disp Refills     amphetamine-dextroamphetamine (ADDERALL XR) 20 MG 24 hr capsule 60 capsule 0     Sig: Take 2 capsules (40 mg) by mouth daily       There is no refill protocol information for this order          Ayanna Strange RN 05/29/22 2:38 PM

## 2022-05-31 DIAGNOSIS — F90.2 ATTENTION DEFICIT HYPERACTIVITY DISORDER (ADHD), COMBINED TYPE: ICD-10-CM

## 2022-05-31 RX ORDER — DEXTROAMPHETAMINE SACCHARATE, AMPHETAMINE ASPARTATE MONOHYDRATE, DEXTROAMPHETAMINE SULFATE AND AMPHETAMINE SULFATE 5; 5; 5; 5 MG/1; MG/1; MG/1; MG/1
40 CAPSULE, EXTENDED RELEASE ORAL DAILY
Qty: 60 CAPSULE | Refills: 0 | OUTPATIENT
Start: 2022-05-31

## 2022-05-31 RX ORDER — DEXTROAMPHETAMINE SACCHARATE, AMPHETAMINE ASPARTATE MONOHYDRATE, DEXTROAMPHETAMINE SULFATE AND AMPHETAMINE SULFATE 5; 5; 5; 5 MG/1; MG/1; MG/1; MG/1
40 CAPSULE, EXTENDED RELEASE ORAL DAILY
Qty: 60 CAPSULE | Refills: 0 | Status: SHIPPED | OUTPATIENT
Start: 2022-05-31 | End: 2022-06-20

## 2022-06-01 ENCOUNTER — TELEPHONE (OUTPATIENT)
Dept: FAMILY MEDICINE | Facility: CLINIC | Age: 32
End: 2022-06-01
Payer: COMMERCIAL

## 2022-06-05 NOTE — TELEPHONE ENCOUNTER
"Routing refill request to provider for review/approval because:  Drug not active on patient's medication list and needs MD review.    Last Written Prescription Date: 10/4/2021 with an end date of 12/14/2021  Last Fill Quantity: 9,  # refills: 11   Last office visit provider:  12/27/2021 virtual visit with DR LUIZ Dumont     Requested Prescriptions   Pending Prescriptions Disp Refills     rizatriptan (MAXALT) 10 MG tablet [Pharmacy Med Name: RIZATRIPTAN 10MG TABLETS] 9 tablet      Sig: TK1 TABLET BY MOUTH AS NEEDED FOR MIGRAINE MAY REPEAT IN 2 HOURS IF NEEDED       Serotonin Agonists Failed - 6/3/2022  8:26 PM        Failed - Serotonin Agonist request needs review.     Please review patient's record. If patient has had 8 or more treatments in the past month, please forward to provider.          Failed - Medication is active on med list        Passed - Blood pressure under 140/90 in past 12 months     BP Readings from Last 3 Encounters:   04/11/22 115/77   11/04/21 94/61   11/04/21 98/66                 Passed - Recent (12 mo) or future (30 days) visit within the authorizing provider's specialty     Patient has had an office visit with the authorizing provider or a provider within the authorizing providers department within the previous 12 mos or has a future within next 30 days. See \"Patient Info\" tab in inbasket, or \"Choose Columns\" in Meds & Orders section of the refill encounter.              Passed - Patient is age 18 or older        Passed - No active pregnancy on record        Passed - No positive pregnancy test in past 12 months             Lili Cooper RN 06/05/22 3:19 PM  "

## 2022-06-06 DIAGNOSIS — G43.001 MIGRAINE WITHOUT AURA AND WITH STATUS MIGRAINOSUS, NOT INTRACTABLE: ICD-10-CM

## 2022-06-07 NOTE — TELEPHONE ENCOUNTER
No pa needed- there is an active pa approval for this medication/ quantity until 1/10/2023.   The pharmacy received a paid claim on 5/31.

## 2022-06-07 NOTE — TELEPHONE ENCOUNTER
"Routing refill request to provider for review/approval because:  Request needs review    Last Written Prescription Date:  3/4/22  Last Fill Quantity: 90,  # refills: 0   Last office visit provider:  12/27/21     Requested Prescriptions   Pending Prescriptions Disp Refills     topiramate (TOPAMAX) 25 MG tablet 90 tablet 0     Sig: Take 1 tablet (25 mg) by mouth daily       Anti-Seizure Meds Protocol  Failed - 6/6/2022  3:29 PM        Failed - Review Authorizing provider's last note.      Refer to last progress notes: confirm request is for original authorizing provider (cannot be through other providers).          Passed - Recent (12 mo) or future (30 days) visit within the authorizing provider's specialty     Patient has had an office visit with the authorizing provider or a provider within the authorizing providers department within the previous 12 mos or has a future within next 30 days. See \"Patient Info\" tab in inbasket, or \"Choose Columns\" in Meds & Orders section of the refill encounter.              Passed - Normal CBC on file in past 26 months     Recent Labs   Lab Test 02/09/21  1456   WBC 5.9   RBC 4.18   HGB 13.1   HCT 39.6                    Passed - Normal ALT or AST on file in past 26 months     Recent Labs   Lab Test 02/09/21  1456   ALT 12     Recent Labs   Lab Test 02/09/21  1456   AST 19             Passed - Normal platelet count on file in past 26 months     Recent Labs   Lab Test 02/09/21  1456                  Passed - Medication is active on med list        Passed - No active pregnancy on record        Passed - No positive pregnancy test in last 12 months             Leydi Diaz, RN 06/07/22 1:27 PM  "

## 2022-06-07 NOTE — TELEPHONE ENCOUNTER
Central Prior Authorization Team  Phone: [phone]    PA Initiation    Medication: ADDERALL XR 20 MG 24 hr capsule  Insurance Company: [employer] [facility] - Phone [phone] Fax [phone]  Pharmacy Filling the Rx: [id number] [place] [address] AT [place] [address]  Filling Pharmacy Phone: [phone]  Filling Pharmacy Fax:    Start Date: 6/7/2022

## 2022-06-08 RX ORDER — RIZATRIPTAN BENZOATE 10 MG/1
TABLET ORAL
Qty: 9 TABLET | OUTPATIENT
Start: 2022-06-08

## 2022-06-08 NOTE — TELEPHONE ENCOUNTER
Declined  1) theoretically should have enough on refills  2) patient reported not taking 11/4/21    Medication Changes       Ibuprofen 600 mg Oral EVERY 8 HOURS PRN      QUEtiapine Fumarate 25 MG 1/2 to 2 po HS for insomnia          Patient not taking:  Reported on 11/4/2021     Rizatriptan Benzoate           Patient not taking:  Reported on 11/4/2021     Protocol Details        Patient not taking:  Reported on 11/4/2021     Protocol Details

## 2022-06-09 RX ORDER — TOPIRAMATE 25 MG/1
25 TABLET, FILM COATED ORAL DAILY
Qty: 30 TABLET | Refills: 0 | Status: SHIPPED | OUTPATIENT
Start: 2022-06-09 | End: 2022-06-20
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
## 2022-06-20 ENCOUNTER — VIRTUAL VISIT (OUTPATIENT)
Dept: FAMILY MEDICINE | Facility: CLINIC | Age: 32
End: 2022-06-20
Payer: COMMERCIAL

## 2022-06-20 DIAGNOSIS — G43.119 INTRACTABLE MIGRAINE WITH AURA WITHOUT STATUS MIGRAINOSUS: ICD-10-CM

## 2022-06-20 DIAGNOSIS — F90.2 ATTENTION DEFICIT HYPERACTIVITY DISORDER (ADHD), COMBINED TYPE: ICD-10-CM

## 2022-06-20 DIAGNOSIS — F31.9 BIPOLAR AFFECTIVE DISORDER, REMISSION STATUS UNSPECIFIED (H): ICD-10-CM

## 2022-06-20 DIAGNOSIS — G43.C0 PERIODIC HEADACHE SYNDROME, NOT INTRACTABLE: Primary | ICD-10-CM

## 2022-06-20 DIAGNOSIS — G43.001 MIGRAINE WITHOUT AURA AND WITH STATUS MIGRAINOSUS, NOT INTRACTABLE: ICD-10-CM

## 2022-06-20 PROCEDURE — 99214 OFFICE O/P EST MOD 30 MIN: CPT | Mod: 95 | Performed by: FAMILY MEDICINE

## 2022-06-20 RX ORDER — RIZATRIPTAN BENZOATE 10 MG/1
10 TABLET ORAL
Qty: 30 TABLET | Refills: 3 | Status: SHIPPED | OUTPATIENT
Start: 2022-06-20 | End: 2022-11-02

## 2022-06-20 RX ORDER — DEXTROAMPHETAMINE SACCHARATE, AMPHETAMINE ASPARTATE MONOHYDRATE, DEXTROAMPHETAMINE SULFATE AND AMPHETAMINE SULFATE 5; 5; 5; 5 MG/1; MG/1; MG/1; MG/1
40 CAPSULE, EXTENDED RELEASE ORAL DAILY
Qty: 60 CAPSULE | Refills: 0 | Status: SHIPPED | OUTPATIENT
Start: 2022-07-18 | End: 2022-11-02

## 2022-06-20 RX ORDER — DEXTROAMPHETAMINE SACCHARATE, AMPHETAMINE ASPARTATE, DEXTROAMPHETAMINE SULFATE AND AMPHETAMINE SULFATE 5; 5; 5; 5 MG/1; MG/1; MG/1; MG/1
20 TABLET ORAL DAILY
Qty: 30 TABLET | Refills: 0 | Status: SHIPPED | OUTPATIENT
Start: 2022-06-20 | End: 2022-08-24

## 2022-06-20 RX ORDER — TOPIRAMATE 25 MG/1
25 TABLET, FILM COATED ORAL DAILY
Qty: 90 TABLET | Refills: 3 | Status: SHIPPED | OUTPATIENT
Start: 2022-06-20 | End: 2023-06-28

## 2022-06-20 RX ORDER — LAMOTRIGINE 200 MG/1
200 TABLET ORAL DAILY
Qty: 90 TABLET | Refills: 3 | Status: SHIPPED | OUTPATIENT
Start: 2022-06-20 | End: 2023-07-05

## 2022-06-20 RX ORDER — DEXTROAMPHETAMINE SACCHARATE, AMPHETAMINE ASPARTATE, DEXTROAMPHETAMINE SULFATE AND AMPHETAMINE SULFATE 5; 5; 5; 5 MG/1; MG/1; MG/1; MG/1
20 TABLET ORAL DAILY
Qty: 30 TABLET | Refills: 0 | Status: SHIPPED | OUTPATIENT
Start: 2022-07-18 | End: 2022-11-02

## 2022-06-20 RX ORDER — DEXTROAMPHETAMINE SACCHARATE, AMPHETAMINE ASPARTATE MONOHYDRATE, DEXTROAMPHETAMINE SULFATE AND AMPHETAMINE SULFATE 5; 5; 5; 5 MG/1; MG/1; MG/1; MG/1
40 CAPSULE, EXTENDED RELEASE ORAL DAILY
Qty: 60 CAPSULE | Refills: 0 | Status: SHIPPED | OUTPATIENT
Start: 2022-06-20 | End: 2022-08-24

## 2022-06-20 ASSESSMENT — PATIENT HEALTH QUESTIONNAIRE - PHQ9: SUM OF ALL RESPONSES TO PHQ QUESTIONS 1-9: 5

## 2022-06-20 NOTE — PROGRESS NOTES
"Tara is a 32 year old who is being evaluated via a billable telephone visit.      What phone number would you like to be contacted at? [phone]  How would you like to obtain your AVS? Thomas        Emmy Pacheco is a 32 year old, presenting for the following health issues:  Medication Therapy Management      HPI     Problem List Items Addressed This Visit        Nervous and Auditory    Intractable migraine with aura     Migraine     Topamax  25 mg HS   Lamictal 200 mg Daily     Maxalt as needed              Relevant Medications    rizatriptan (MAXALT) 10 MG tablet    lamoTRIgine (LAMICTAL) 200 MG tablet    topiramate (TOPAMAX) 25 MG tablet       Behavioral    Attention deficit hyperactivity disorder (ADHD)     Adderall 40 mg XR  Add IR 20 mg at 2:00 PM      \"Have to take it later in the day\"  Second half of days    Helps with the busy part of the day    Side Effects?  No   No HA  No palpitations  No sleep Issues                    Relevant Medications    amphetamine-dextroamphetamine (ADDERALL) 20 MG tablet    amphetamine-dextroamphetamine (ADDERALL XR) 20 MG 24 hr capsule    amphetamine-dextroamphetamine (ADDERALL XR) 20 MG 24 hr capsule (Start on 7/18/2022)    amphetamine-dextroamphetamine (ADDERALL) 20 MG tablet (Start on 7/18/2022)    Bipolar disorder (H)    Relevant Medications    lamoTRIgine (LAMICTAL) 200 MG tablet      Other Visit Diagnoses     Migraine    -  Primary    Relevant Medications    rizatriptan (MAXALT) 10 MG tablet    lamoTRIgine (LAMICTAL) 200 MG tablet    topiramate (TOPAMAX) 25 MG tablet    Migraine without aura and with status migrainosus, not intractable        Relevant Medications    rizatriptan (MAXALT) 10 MG tablet    lamoTRIgine (LAMICTAL) 200 MG tablet    topiramate (TOPAMAX) 25 MG tablet          Patient Instructions   Continue Adderall 40 mg daily    Have refilled medication for headache prevention    Lets add 20 mg of Adderall immediate release for 2 " PM    DanL      Review of Systems   Inattention    Intermittent headaches fairly well controlled on current regimen      Objective           Vitals:  No vitals were obtained today due to virtual visit.    Physical Exam   Nonpressured speech    Phone call duration: 17 minutes

## 2022-06-20 NOTE — ASSESSMENT & PLAN NOTE
"Adderall 40 mg XR  Add IR 20 mg at 2:00 PM      \"Have to take it later in the day\"  Second half of days    Helps with the busy part of the day    Side Effects?  No   No HA  No palpitations  No sleep Issues           "

## 2022-06-20 NOTE — PATIENT INSTRUCTIONS
Continue Adderall 40 mg daily    Have refilled medication for headache prevention    Lets add 20 mg of Adderall immediate release for 2 PM    DanL

## 2022-07-13 DIAGNOSIS — L08.9 FOOT INFECTION: Primary | ICD-10-CM

## 2022-07-15 ENCOUNTER — LAB (OUTPATIENT)
Dept: LAB | Facility: CLINIC | Age: 32
End: 2022-07-15
Payer: COMMERCIAL

## 2022-07-15 ENCOUNTER — DOCUMENTATION ONLY (OUTPATIENT)
Dept: LAB | Facility: CLINIC | Age: 32
End: 2022-07-15

## 2022-07-15 ENCOUNTER — OFFICE VISIT (OUTPATIENT)
Dept: VASCULAR SURGERY | Facility: CLINIC | Age: 32
End: 2022-07-15
Attending: FAMILY MEDICINE
Payer: COMMERCIAL

## 2022-07-15 VITALS — HEART RATE: 80 BPM | TEMPERATURE: 98.3 F | SYSTOLIC BLOOD PRESSURE: 98 MMHG | DIASTOLIC BLOOD PRESSURE: 62 MMHG

## 2022-07-15 DIAGNOSIS — B35.1 ONYCHOMYCOSIS: ICD-10-CM

## 2022-07-15 DIAGNOSIS — B35.1 ONYCHOMYCOSIS: Primary | ICD-10-CM

## 2022-07-15 DIAGNOSIS — B35.3 TINEA PEDIS OF BOTH FEET: ICD-10-CM

## 2022-07-15 DIAGNOSIS — L60.0 INGROWN NAIL: ICD-10-CM

## 2022-07-15 DIAGNOSIS — B35.3 TINEA PEDIS OF BOTH FEET: Primary | ICD-10-CM

## 2022-07-15 PROBLEM — G47.09 OTHER INSOMNIA: Status: ACTIVE | Noted: 2021-04-26

## 2022-07-15 PROBLEM — K58.8 OTHER IRRITABLE BOWEL SYNDROME: Status: ACTIVE | Noted: 2019-03-24

## 2022-07-15 PROBLEM — F12.90 MARIJUANA USE: Status: ACTIVE | Noted: 2018-01-30

## 2022-07-15 PROBLEM — E05.90 HYPERTHYROIDISM: Status: ACTIVE | Noted: 2019-01-10

## 2022-07-15 PROBLEM — F20.9 SCHIZOPHRENIA (H): Status: ACTIVE | Noted: 2018-01-30

## 2022-07-15 PROBLEM — Z72.0 TOBACCO USER: Status: ACTIVE | Noted: 2022-07-15

## 2022-07-15 LAB
ALBUMIN SERPL BCG-MCNC: 4.8 G/DL (ref 3.5–5.2)
ALP SERPL-CCNC: 53 U/L (ref 35–104)
ALT SERPL W P-5'-P-CCNC: 13 U/L (ref 10–35)
AST SERPL W P-5'-P-CCNC: 25 U/L (ref 10–35)
BILIRUB DIRECT SERPL-MCNC: <0.2 MG/DL (ref 0–0.3)
BILIRUB SERPL-MCNC: 0.4 MG/DL
PROT SERPL-MCNC: 7.1 G/DL (ref 6.4–8.3)

## 2022-07-15 PROCEDURE — 36415 COLL VENOUS BLD VENIPUNCTURE: CPT

## 2022-07-15 PROCEDURE — 99203 OFFICE O/P NEW LOW 30 MIN: CPT | Mod: 25 | Performed by: PODIATRIST

## 2022-07-15 PROCEDURE — 11732 AVLSN NAIL PLATE SIMPLE EACH: CPT | Mod: T5 | Performed by: PODIATRIST

## 2022-07-15 PROCEDURE — 11730 AVULSION NAIL PLATE SIMPLE 1: CPT | Mod: T5 | Performed by: PODIATRIST

## 2022-07-15 PROCEDURE — 80076 HEPATIC FUNCTION PANEL: CPT

## 2022-07-15 RX ORDER — TERBINAFINE HYDROCHLORIDE 250 MG/1
250 TABLET ORAL DAILY
Qty: 90 TABLET | Refills: 0 | Status: SHIPPED | OUTPATIENT
Start: 2022-07-15 | End: 2022-10-13

## 2022-07-15 RX ORDER — CLOTRIMAZOLE AND BETAMETHASONE DIPROPIONATE 10; .64 MG/G; MG/G
CREAM TOPICAL 2 TIMES DAILY
Qty: 45 G | Refills: 0 | Status: SHIPPED | OUTPATIENT
Start: 2022-07-15 | End: 2023-04-07
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
## 2022-07-15 NOTE — LETTER
[facility] [place]  [address]  [place] [address]  [phone]    July 15, 2022    Re: Tara Sorenson  [address] [place] [address]  [phone] (home)     : 1990      To Whom It May Concern:      Tara Sorenson was seen in clinic 7/15/2022  due to surgery.  She may return to work on 22 with full duty.        Sincerely,            Yang Knutson DPM

## 2022-07-15 NOTE — PATIENT INSTRUCTIONS
Post Nail Excision Instructions    Keep bandages clean, dry, and intact for the rest of the day of surgery.   The day after surgery, remove all bandages  Soak foot in warm water with Epsom Salt for 10 minutes  Dry feet thoroughly   Apply a Band-Aid to the affected area  Continue this process daily for the next two weeks following the procedure  General bathing does not replace daily foot soaks  Do not anticipate severe pain. But if you do experience some discomfort, you can take Ibuprofen (Advil)  You can expect some drainage and weeping from the area. This may last anywhere from several days to several weeks. This is NORMAL.  If you experience any increase in pain, any swelling, or odor call our office immediately. As this may be a sign of infection.    If you have any questions in the meantime, please do not hesitate to call Podiatry at [phone]    our doctor has recommended you start taking Terbinafine. Terbinafine is an antifungal medication used to treat certain types of fungal infections. You will take this medication everyday for 90 days.     Prior to starting Terbinafine you will need to have your labs drawn. You can stop into the lab located on the first floor to have these drawn.     We will repeat the labs again after you have been taking the medication for 6 weeks. You can walk-in to the lab located on the first floor on or around  [address]UST 26[address]      Terbinafine tablets  Brand Names: Lamisil, Terbinex   What is this medicine?  TERBINAFINE (TER bin a feen) is an antifungal medicine. It is used to treat certain kinds of fungal or yeast infections.  How should I use this medicine?  Take this medicine by mouth with a full glass of water. Follow the directions on the prescription label. You can take this medicine with food or on an empty stomach. Take your medicine at regular intervals. Do not take your medicine more often than directed. Do not skip doses or stop your medicine early even if you feel better.  Do not stop taking except on your doctor's advice. Talk to your pediatrician regarding the use of this medicine in children. Special care may be needed.  What side effects may I notice from receiving this medicine?  Side effects that you should report to your doctor or health care professional as soon as possible:  allergic reactions like skin rash or hives, swelling of the face, lips, or tongue  changes in vision  dark urine  fever or infection  general ill feeling or flu-like symptoms  light-colored stools  loss of appetite, nausea  redness, blistering, peeling or loosening of the skin, including inside the mouth  right upper belly pain  unusually weak or tired  yellowing of the eyes or skin  Side effects that usually do not require medical attention (report to your doctor or health care professional if they continue or are bothersome):  changes in taste  diarrhea  hair loss  muscle or joint pain  stomach gas  stomach upset  What may interact with this medicine?  Do not take this medicine with any of the following medications:  thioridazine  This medicine may also interact with the following medications:  beta-blockers  caffeine  cimetidine  cyclosporine  medicines for depression, anxiety, or psychotic disturbances  medicines for fungal infections like fluconazole and ketoconazole  medicines for irregular heartbeat like amiodarone, flecainide and propafenone  rifampin  warfarin  What if I miss a dose?  If you miss a dose, take it as soon as you can. If it is almost time for your next dose, take only that dose. Do not take double or extra doses.  Where should I keep my medicine?  Keep out of the reach of children.  Store at room temperature below 25 degrees C (77 degrees F). Protect from light. Throw away any unused medicine after the expiration date.  What should I tell my health care provider before I take this medicine?  They need to know if you have any of these conditions:  drink alcoholic beverages  kidney  disease  liver disease  an unusual or allergic reaction to terbinafine, other medicines, foods, dyes, or preservatives  pregnant or trying to get pregnant  breast-feeding  What should I watch for while using this medicine?  Visit your doctor or health care provider regularly. Tell your doctor right away if you have nausea or vomiting, loss of appetite, stomach pain on your right upper side, yellow skin, dark urine, light stools, or are over tired. Some fungal infections need many weeks or months of treatment to cure. If you are taking this medicine for a long time, you will need to have important blood work done.

## 2022-07-15 NOTE — PROGRESS NOTES
Patient came in to the lab today but went upstair after checking in. She never returned to the lab and we have not been able to contact her via phone. Lab for today has been canceled and a new order will be placed

## 2022-07-19 ENCOUNTER — PATIENT OUTREACH (OUTPATIENT)
Dept: CARE COORDINATION | Facility: CLINIC | Age: 32
End: 2022-07-19

## 2022-07-19 NOTE — PROGRESS NOTES
Contact   Chart Review     Situation: Patient chart reviewed by .    Background: Talked to patient regarding her daughter's needs and she asked to pass a message about her medications to her PCP.     Assessment: Her PCP has left the clinic and she is concerned about getting refill for Adderall 20 mg refilled.  She will run out soon.     Agreed to forward to her care team for assistance.     Plan/Recommendations: No further contact Team to follow up with patient.     Rima Sue,   [facility]  [phone]

## 2022-07-25 ENCOUNTER — VIRTUAL VISIT (OUTPATIENT)
Dept: PODIATRY | Facility: CLINIC | Age: 32
End: 2022-07-25
Payer: COMMERCIAL

## 2022-07-25 DIAGNOSIS — L60.0 INGROWN TOENAIL: Primary | ICD-10-CM

## 2022-07-25 PROCEDURE — 99024 POSTOP FOLLOW-UP VISIT: CPT | Performed by: PODIATRIST
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
## 2022-07-27 DIAGNOSIS — R51.9 NONINTRACTABLE HEADACHE, UNSPECIFIED CHRONICITY PATTERN, UNSPECIFIED HEADACHE TYPE: ICD-10-CM

## 2022-07-28 RX ORDER — IBUPROFEN 600 MG/1
600 TABLET, FILM COATED ORAL EVERY 8 HOURS PRN
Qty: 60 TABLET | Refills: 1 | Status: SHIPPED | OUTPATIENT
Start: 2022-07-28 | End: 2022-11-02

## 2022-07-28 NOTE — TELEPHONE ENCOUNTER
"Routing refill request to provider for review/approval because:  Labs not current:  CBC and creatinine  Patient needs to be seen    Last Written Prescription Date:  4/1/2022  Last Fill Quantity: 60,  # refills: 1   Last office visit provider:  6/20/2022 - virtual     Requested Prescriptions   Pending Prescriptions Disp Refills     ibuprofen (ADVIL/MOTRIN) 600 MG tablet 60 tablet 1     Sig: Take 1 tablet (600 mg) by mouth every 8 hours as needed for moderate pain Or migraine       NSAID Medications Failed - 7/27/2022  5:22 PM        Failed - Recent (12 mo) or future (30 days) visit within the authorizing provider's specialty     Patient has had an office visit with the authorizing provider or a provider within the authorizing providers department within the previous 12 mos or has a future within next 30 days. See \"Patient Info\" tab in inbasket, or \"Choose Columns\" in Meds & Orders section of the refill encounter.              Failed - Normal CBC on file in past 12 months     Recent Labs   Lab Test 02/09/21  1456   WBC 5.9   RBC 4.18   HGB 13.1   HCT 39.6                    Failed - Normal serum creatinine on file in past 12 months     Recent Labs   Lab Test 02/09/21  1456   CR 0.73       Ok to refill medication if creatinine is low          Passed - Blood pressure under 140/90 in past 12 months     BP Readings from Last 3 Encounters:   07/15/22 98/62   04/11/22 115/77   11/04/21 94/61                 Passed - Normal ALT on file in past 12 months     Recent Labs   Lab Test 07/15/22  1053   ALT 13             Passed - Normal AST on file in past 12 months     Recent Labs   Lab Test 07/15/22  1053   AST 25             Passed - Patient is age 6-64 years        Passed - Medication is active on med list        Passed - No active pregnancy on record        Passed - No positive pregnancy test in past 12 months             Lena Alarcon RN 07/27/22 11:49 PM  "

## 2022-08-23 ENCOUNTER — TELEPHONE (OUTPATIENT)
Dept: FAMILY MEDICINE | Facility: CLINIC | Age: 32
End: 2022-08-23

## 2022-08-23 DIAGNOSIS — F90.2 ATTENTION DEFICIT HYPERACTIVITY DISORDER (ADHD), COMBINED TYPE: ICD-10-CM

## 2022-08-23 NOTE — TELEPHONE ENCOUNTER
Medication Question or Refill        What medication are you calling about (include dose and sig)?:   amphetamine-dextroamphetamine (ADDERALL XR) 20 MG 24 hr capsule 60 capsule 0 7/18/2022  No   Sig - Route: Take 2 capsules (40 mg) by mouth daily - Oral     amphetamine-dextroamphetamine (ADDERALL) 20 MG tablet 30 tablet 0 7/18/2022  --   Sig - Route: Take 1 tablet (20 mg) by mouth daily As needed in the PM for attention deficit therapy boost - Oral         Controlled Substance Agreement on file:   CSA -- Patient Level:    Controlled Substance Agreement - Opioid - Scan on 4/21/2017       Who prescribed the medication?: Dr Dumont did    Do you need a refill? Yes:     When did you use the medication last?     Patient offered an appointment? No    Do you have any questions or concerns?  No    Preferred Pharmacy:   [id number] [address] AT [address] [address]  Phone: [phone] Fax: [phone]      Could we send this information to you in TapMe or would you prefer to receive a phone call?:   Patient would prefer a phone call   Okay to leave a detailed message?: Yes at Cell number on file:    Telephone Information:   Mobile [phone]

## 2022-08-24 RX ORDER — DEXTROAMPHETAMINE SACCHARATE, AMPHETAMINE ASPARTATE, DEXTROAMPHETAMINE SULFATE AND AMPHETAMINE SULFATE 5; 5; 5; 5 MG/1; MG/1; MG/1; MG/1
20 TABLET ORAL DAILY
Qty: 30 TABLET | Refills: 0 | Status: SHIPPED | OUTPATIENT
Start: 2022-08-24 | End: 2022-09-29

## 2022-08-24 RX ORDER — DEXTROAMPHETAMINE SACCHARATE, AMPHETAMINE ASPARTATE MONOHYDRATE, DEXTROAMPHETAMINE SULFATE AND AMPHETAMINE SULFATE 5; 5; 5; 5 MG/1; MG/1; MG/1; MG/1
40 CAPSULE, EXTENDED RELEASE ORAL DAILY
Qty: 60 CAPSULE | Refills: 0 | Status: SHIPPED | OUTPATIENT
Start: 2022-08-24 | End: 2022-09-29

## 2022-08-26 ENCOUNTER — OFFICE VISIT (OUTPATIENT)
Dept: ORTHOPEDICS | Facility: CLINIC | Age: 32
End: 2022-08-26
Payer: COMMERCIAL

## 2022-08-26 DIAGNOSIS — L85.3 XEROSIS OF SKIN: ICD-10-CM

## 2022-08-26 DIAGNOSIS — L60.8 NAIL DEFORMITY: ICD-10-CM

## 2022-08-26 DIAGNOSIS — L24.9 IRRITANT CONTACT DERMATITIS, UNSPECIFIED TRIGGER: Primary | ICD-10-CM

## 2022-08-26 PROCEDURE — 99204 OFFICE O/P NEW MOD 45 MIN: CPT | Performed by: PODIATRIST

## 2022-08-26 RX ORDER — DESONIDE 0.5 MG/G
OINTMENT TOPICAL DAILY PRN
Qty: 30 G | Refills: 0 | Status: SHIPPED | OUTPATIENT
Start: 2022-08-26 | End: 2023-04-07

## 2022-08-26 RX ORDER — UREA 200 MG/G
CREAM TOPICAL PRN
Qty: 75 G | Refills: 0 | Status: SHIPPED | OUTPATIENT
Start: 2022-08-26 | End: 2023-04-07
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
## 2022-08-27 ENCOUNTER — HEALTH MAINTENANCE LETTER (OUTPATIENT)
Age: 32
End: 2022-08-27

## 2022-09-28 ENCOUNTER — TELEPHONE (OUTPATIENT)
Dept: FAMILY MEDICINE | Facility: CLINIC | Age: 32
End: 2022-09-28

## 2022-09-28 NOTE — TELEPHONE ENCOUNTER
I talked to Tara about her daughter's test results.  Tara asked me to send a message to the team covering for Dr Dumont regarding refills of her medications and also establishing care with a new primary care physician for herself and her family.  My practice is full and I do not take new patients but I told her that I would have someone reach out to her to review options at various clinics for establishing care and to help take care of the refill concern.  Thanks.

## 2022-09-29 DIAGNOSIS — F90.2 ATTENTION DEFICIT HYPERACTIVITY DISORDER (ADHD), COMBINED TYPE: ICD-10-CM

## 2022-09-29 RX ORDER — DEXTROAMPHETAMINE SACCHARATE, AMPHETAMINE ASPARTATE, DEXTROAMPHETAMINE SULFATE AND AMPHETAMINE SULFATE 5; 5; 5; 5 MG/1; MG/1; MG/1; MG/1
20 TABLET ORAL DAILY
Qty: 30 TABLET | Refills: 0 | Status: SHIPPED | OUTPATIENT
Start: 2022-09-29 | End: 2022-11-02

## 2022-09-29 RX ORDER — DEXTROAMPHETAMINE SACCHARATE, AMPHETAMINE ASPARTATE MONOHYDRATE, DEXTROAMPHETAMINE SULFATE AND AMPHETAMINE SULFATE 5; 5; 5; 5 MG/1; MG/1; MG/1; MG/1
40 CAPSULE, EXTENDED RELEASE ORAL DAILY
Qty: 60 CAPSULE | Refills: 0 | Status: SHIPPED | OUTPATIENT
Start: 2022-09-29 | End: 2022-11-02

## 2022-09-29 NOTE — TELEPHONE ENCOUNTER
----- Message from Tiera Scott sent at 9/28/2022  2:49 PM CDT -----  Hi Dr. Dumont,     Please see below.     YUNIEL Mott CMA  ----- Message -----  From: Aditya Garcia MD  Sent: 9/28/2022   2:45 PM CDT  To: [facility] Care Team Pool    Please fwd to Dr Dumont covering [place].    See phone note.    I talked to Tara about her daughter's test results.  Tara asked me to send a message to the team covering for Dr Dumont regarding refills of her medications and also establishing care with a new primary care physician for herself and her family.  My practice is full and I do not take new patients but I told her that I would have someone reach out to her to review options at various clinics for establishing care and to help take care of the refill concern.  Thanks.

## 2022-09-29 NOTE — TELEPHONE ENCOUNTER
Spoke with Tara.     She is requesting adderall refills as pended. She is also wondering if a provider at the [facility] can re-certify her for medical cannabis, she would like a virtual appointment for this if possible.     Pt was informed that she could establish care with a provider at the [facility] however her family would need two different providers (one for her kids and one for herself.) Pt was informed that she could call central scheduling for other options for establishing care.

## 2022-10-27 ENCOUNTER — PATIENT OUTREACH (OUTPATIENT)
Dept: CARE COORDINATION | Facility: CLINIC | Age: 32
End: 2022-10-27

## 2022-10-31 ENCOUNTER — TELEPHONE (OUTPATIENT)
Dept: INTERNAL MEDICINE | Facility: CLINIC | Age: 32
End: 2022-10-31

## 2022-10-31 NOTE — TELEPHONE ENCOUNTER
10/31 Pt has a video appt with Dr. Prieto is in need for Adderall  40 and 20 filled and Ibuprofen 600 mg refilled call in to [facility]

## 2022-11-02 ENCOUNTER — VIRTUAL VISIT (OUTPATIENT)
Dept: INTERNAL MEDICINE | Facility: CLINIC | Age: 32
End: 2022-11-02
Payer: COMMERCIAL

## 2022-11-02 DIAGNOSIS — F41.1 GAD (GENERALIZED ANXIETY DISORDER): ICD-10-CM

## 2022-11-02 DIAGNOSIS — G43.119 INTRACTABLE MIGRAINE WITH AURA WITHOUT STATUS MIGRAINOSUS: Primary | ICD-10-CM

## 2022-11-02 DIAGNOSIS — R51.9 NONINTRACTABLE HEADACHE, UNSPECIFIED CHRONICITY PATTERN, UNSPECIFIED HEADACHE TYPE: ICD-10-CM

## 2022-11-02 DIAGNOSIS — F90.2 ATTENTION DEFICIT HYPERACTIVITY DISORDER (ADHD), COMBINED TYPE: ICD-10-CM

## 2022-11-02 DIAGNOSIS — Z00.00 PREVENTATIVE HEALTH CARE: ICD-10-CM

## 2022-11-02 DIAGNOSIS — F43.10 PTSD (POST-TRAUMATIC STRESS DISORDER): ICD-10-CM

## 2022-11-02 DIAGNOSIS — G43.001 MIGRAINE WITHOUT AURA AND WITH STATUS MIGRAINOSUS, NOT INTRACTABLE: ICD-10-CM

## 2022-11-02 PROCEDURE — 99214 OFFICE O/P EST MOD 30 MIN: CPT | Mod: 95 | Performed by: INTERNAL MEDICINE

## 2022-11-02 RX ORDER — DEXTROAMPHETAMINE SACCHARATE, AMPHETAMINE ASPARTATE MONOHYDRATE, DEXTROAMPHETAMINE SULFATE AND AMPHETAMINE SULFATE 5; 5; 5; 5 MG/1; MG/1; MG/1; MG/1
40 CAPSULE, EXTENDED RELEASE ORAL DAILY
Qty: 60 CAPSULE | Refills: 0 | Status: SHIPPED | OUTPATIENT
Start: 2022-11-02 | End: 2022-12-08

## 2022-11-02 RX ORDER — DEXTROAMPHETAMINE SACCHARATE, AMPHETAMINE ASPARTATE, DEXTROAMPHETAMINE SULFATE AND AMPHETAMINE SULFATE 5; 5; 5; 5 MG/1; MG/1; MG/1; MG/1
20 TABLET ORAL DAILY
Qty: 30 TABLET | Refills: 0 | Status: SHIPPED | OUTPATIENT
Start: 2022-11-02 | End: 2022-12-08

## 2022-11-02 RX ORDER — RIZATRIPTAN BENZOATE 10 MG/1
10 TABLET ORAL
Qty: 30 TABLET | Refills: 3 | Status: SHIPPED | OUTPATIENT
Start: 2022-11-02

## 2022-11-02 RX ORDER — IBUPROFEN 600 MG/1
600 TABLET, FILM COATED ORAL EVERY 8 HOURS PRN
Qty: 60 TABLET | Refills: 1 | Status: SHIPPED | OUTPATIENT
Start: 2022-11-02 | End: 2023-01-09

## 2022-11-02 NOTE — PROGRESS NOTES
"Tara is a 32 year old who is being evaluated via a billable video visit.      How would you like to obtain your AVS? MyChart  If the video visit is dropped, the invitation should be resent by: Text to cell phone: [phone]  Will anyone else be joining your video visit? No  {If patient encounters technical issues they should call [phone] :276794}        {PROVIDER CHARTING PREFERENCE:067797}    Subjective   Tara is a 32 year old presenting for the following health issues:    OTHER (Re certification Medical Marijuana  )      HPI     {SUPERLIST (Optional):912669}  {additonal problems for provider to add (Optional):086905}    Review of Systems   {ROS COMP (Optional):836037}      Objective           Vitals:  No vitals were obtained today due to virtual visit.    Physical Exam   {video visit exam brief selected:302543::\"GENERAL: Healthy, alert and no distress\",\"EYES: Eyes grossly normal to inspection.  No discharge or erythema, or obvious scleral/conjunctival abnormalities.\",\"RESP: No audible wheeze, cough, or visible cyanosis.  No visible retractions or increased work of breathing.  \",\"SKIN: Visible skin clear. No significant rash, abnormal pigmentation or lesions.\",\"NEURO: Cranial nerves grossly intact.  Mentation and speech appropriate for age.\",\"PSYCH: Mentation appears normal, affect normal/bright, judgement and insight intact, normal speech and appearance well-groomed.\"}    {Diagnostic Test Results (Optional):214168}    {AMBULATORY ATTESTATION (Optional):817004}        Video-Visit Details    Video Start Time: {video visit start/end time for provider to select:027424}    Type of service:  Video Visit    Video End Time:{video visit start/end time for provider to select:305208}    Originating Location (pt. Location): {video visit patient location:026705::\"Home\"}    {PROVIDER LOCATION On-site should be selected for visits conducted from your clinic location or adjoining [facility], academic office, or " "other nearby [facility] building. Off-site should be selected for all other provider locations, including home:682575}    Distant Location (provider location):  {virtual location provider:710045}    Platform used for Video Visit: {Virtual Visit Platforms:164949::\"[employer]\"}    "

## 2022-11-02 NOTE — PROGRESS NOTES
Tara is a 32 year old female contacting the clinic today via video, who will use the platform: [employer] for the visit.  Phone # for [employer], or if [facility] drops:   Telephone Information:   Mobile [phone]          ASSESSMENT and PLAN:  1. Intractable migraine with aura without status migrainosus  Certify for cannabis. Suggest migraine clinic.  Pharm.D. referral to consider Emg[facility].  - medical cannabis (Patient's own supply); The purpose of this order is to document that the patient reports taking medical cannabis.  Dispense: 0 Information only; Refill: 0  - Med Therapy Management Referral    2. PTSD (post-traumatic stress disorder)  Recertify cannabis  - medical cannabis (Patient's own supply); The purpose of this order is to document that the patient reports taking medical cannabis.  Dispense: 0 Information only; Refill: 0    3. AURELIO (generalized anxiety disorder)  Effective for same  - medical cannabis (Patient's own supply); The purpose of this order is to document that the patient reports taking medical cannabis.  Dispense: 0 Information only; Refill: 0    4. Preventative health care  - Pneumococcal 20 Valent Conjugate (Prevnar 20); Future  - INFLUENZA VACCINE IM > 6 MONTHS VALENT IIV4 (AFLURIA/FLUZONE); Future    5. Attention deficit hyperactivity disorder (ADHD), combined type  Reviewed   - amphetamine-dextroamphetamine (ADDERALL XR) 20 MG 24 hr capsule; Take 2 capsules (40 mg) by mouth daily  Dispense: 60 capsule; Refill: 0  - amphetamine-dextroamphetamine (ADDERALL) 20 MG tablet; Take 1 tablet (20 mg) by mouth daily As needed in the PM for attention deficit therapy boost  Dispense: 30 tablet; Refill: 0    6. Nonintractable headache, unspecified chronicity pattern, unspecified headache type  - ibuprofen (ADVIL/MOTRIN) 600 MG tablet; Take 1 tablet (600 mg) by mouth every 8 hours as needed for moderate pain Or migraine  Dispense: 60 tablet; Refill: 1    7. Migraine without aura and with status  "migrainosus, not intractable  Okay to refill  - rizatriptan (MAXALT) 10 MG tablet; Take 1 tablet (10 mg) by mouth at onset of headache for migraine May repeat in 2 hours. Max 3 tablets/24 hours.  Dispense: 30 tablet; Refill: 3       Patient Instructions   Recertify for medical cannabis after obtaining P number    Renew Adderall     reviewed    Refill ibuprofen and Maxalt    Suggest Emgality    [facility] pharmacy referral to discuss Emgality            Return in about 6 months (around 5/2/2023) for using a video visit.       CHIEF COMPLAINT:  Chief Complaint   Patient presents with     OTHER     Re certification Medical Marijuana         HPI    HISTORY OF PRESENT ILLNESS:  Tara is a 32 year old female contacting the clinic today via video for recertification for medical visit previous physician that enrolled her.  Did not know P number C needed to obtain at.  Takes for migraines, anxiety and PTSD    Reviewed migraine plan.  Suggest Emgality    Reviewed Adderall usage.   reviewed    REVIEW OF SYSTEMS:         PFSH:  Social History     Social History Narrative    Stay at home mother.      Works at [employer]    TOBACCO USE:  History   Smoking Status     Every Day     Packs/day: 0.25     Types: Cigarettes, Cigarettes, Cigarettes     Last attempt to quit: 8/1/2018   Smokeless Tobacco     Never     Comment: 3-4 a day       VITALS:  There were no vitals filed for this visit.  LMP 12/03/2014 (Exact Date)  Estimated body mass index is 16.65 kg/m  as calculated from the following:    Height as of 4/11/22: 1.6 m (5' 3\").    Weight as of 4/11/22: 42.6 kg (94 lb).    PHYSICAL EXAM:  (observations via Video)  Alert and oriented    MEDICATIONS:   Current Outpatient Medications   Medication Sig Dispense Refill     amphetamine-dextroamphetamine (ADDERALL XR) 20 MG 24 hr capsule Take 2 capsules (40 mg) by mouth daily 60 capsule 0     amphetamine-dextroamphetamine (ADDERALL) 20 MG tablet Take 1 tablet (20 mg) by mouth daily " As needed in the PM for attention deficit therapy boost 30 tablet 0     ibuprofen (ADVIL/MOTRIN) 600 MG tablet Take 1 tablet (600 mg) by mouth every 8 hours as needed for moderate pain Or migraine 60 tablet 1     lamoTRIgine (LAMICTAL) 200 MG tablet Take 1 tablet (200 mg) by mouth daily Take 200 mg by mouth 90 tablet 3     magnesium oxide (MAG-OX) 400 (241.3 Mg) MG tablet Take 1 tablet (400 mg) by mouth daily 30 tablet 3     medical cannabis (Patient's own supply) The purpose of this order is to document that the patient reports taking medical cannabis. 0 Information only 0     pyridOXINE (VITAMIN B-6) 25 MG tablet Take 25 mg by mouth       QUEtiapine (SEROQUEL) 25 MG tablet 1/2 to 2 po HS for insomnia 90 tablet 1     rizatriptan (MAXALT) 10 MG tablet Take 1 tablet (10 mg) by mouth at onset of headache for migraine May repeat in 2 hours. Max 3 tablets/24 hours. 30 tablet 3     topiramate (TOPAMAX) 25 MG tablet Take 1 tablet (25 mg) by mouth daily 90 tablet 3     clotrimazole-betamethasone (LOTRISONE) 1-0.05 % external cream Apply topically 2 times daily 45 g 0     desonide (DESOWEN) 0.05 % external ointment Apply topically daily as needed 30 g 0     urea (GORMEL) 20 % external cream Apply topically as needed to right hallux 75 g 0       Outside Notes summarized:   Labs, x-rays, cardiology, GI tests reviewed:   Recent Labs   Lab Test 02/09/21  1456   HGB 13.1   WBC 5.9      POTASSIUM 4.2   CR 0.73   TSH 0.63     Lab Results   Component Value Date    [id number] Not Detected 11/04/2021     No results found for: CHOL  New orders:   Orders Placed This Encounter   Procedures     Pneumococcal 20 Valent Conjugate (Prevnar 20)     INFLUENZA VACCINE IM > 6 MONTHS VALENT IIV4 (AFLURIA/FLUZONE)     Med Therapy Management Referral       Independent review of:    Supplemental history by:      Patient has given verbal consent to a Video visit?  Yes  How would you like to obtain your AVS?  MyChart  Will anyone else be joining  your video visit? No       Video-Visit Details  Type of service:  Video Visit  Originating Location (pt. Location): Other work    Distant Location (provider location):  Off-site    Jim Prieto MD  [facility]    Video Start Time: 10:13 AM  Video End time:  10:43 AM  Face to face plus orders: 30 minutes  Documentation time:  3 minutes     The visit lasted a total of 31 minutes

## 2022-11-02 NOTE — PATIENT INSTRUCTIONS
Recertify for medical cannabis after obtaining P number    Renew Adderall     reviewed    Refill ibuprofen and Maxalt    Suggest Emgality    [facility] pharmacy referral to discuss Emgality

## 2022-11-04 ENCOUNTER — TELEPHONE (OUTPATIENT)
Dept: FAMILY MEDICINE | Facility: CLINIC | Age: 32
End: 2022-11-04

## 2022-11-04 NOTE — TELEPHONE ENCOUNTER
[facility] referral from: [facility] visit (referral by provider)    MT referral outreach attempt #1 on November 4, 2022 at 10:26 AM      Outcome: Spoke with patient. Patient is happy with the medicine she takes for her migraines.  Patient declined visit.    Lisbeth Salazar [facility]

## 2022-11-22 ENCOUNTER — OFFICE VISIT (OUTPATIENT)
Dept: FAMILY MEDICINE | Facility: CLINIC | Age: 32
End: 2022-11-22
Payer: COMMERCIAL

## 2022-11-22 VITALS
DIASTOLIC BLOOD PRESSURE: 72 MMHG | OXYGEN SATURATION: 100 % | WEIGHT: 104.1 LBS | TEMPERATURE: 99.2 F | HEART RATE: 86 BPM | SYSTOLIC BLOOD PRESSURE: 107 MMHG | RESPIRATION RATE: 20 BRPM | BODY MASS INDEX: 18.44 KG/M2

## 2022-11-22 DIAGNOSIS — R50.9 FEVER, UNSPECIFIED FEVER CAUSE: ICD-10-CM

## 2022-11-22 DIAGNOSIS — J10.1 INFLUENZA A: Primary | ICD-10-CM

## 2022-11-22 LAB
FLUAV AG SPEC QL IA: POSITIVE
FLUBV AG SPEC QL IA: NEGATIVE

## 2022-11-22 PROCEDURE — 99213 OFFICE O/P EST LOW 20 MIN: CPT | Performed by: PHYSICIAN ASSISTANT

## 2022-11-22 PROCEDURE — 87804 INFLUENZA ASSAY W/OPTIC: CPT | Performed by: PHYSICIAN ASSISTANT

## 2022-11-22 RX ORDER — ACETAMINOPHEN 325 MG/1
650 TABLET ORAL ONCE
Status: COMPLETED | OUTPATIENT
Start: 2022-11-22 | End: 2022-11-22

## 2022-11-22 RX ORDER — OSELTAMIVIR PHOSPHATE 75 MG/1
75 CAPSULE ORAL 2 TIMES DAILY
Qty: 10 CAPSULE | Refills: 0 | Status: SHIPPED | OUTPATIENT
Start: 2022-11-22 | End: 2023-04-07

## 2022-11-22 RX ADMIN — ACETAMINOPHEN 650 MG: 325 TABLET ORAL at 11:59
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
## 2022-11-22 NOTE — PATIENT INSTRUCTIONS
1. Take Tylenol or Ibuprofen as needed for fever relief.   2. Take Tamiflu, follow directions on prescription.  3. Increase fluids and rest.  4. Influenza is typically considered contagious one day prior and 5-7 days after your symptoms begin. I recommend returning to work/school after you are fever free for 24 hours. Continue to practice good hand hygiene and cough coverage well after you are fever free.   5. Follow up if you develop fever that can not be controlled, difficulty breathing, or severe dehydration.    Influenza  Influenza ( the flu ) is an infection that affects your respiratory tract (the mouth, nose, and lungs, and the passages between them). Unlike a cold, the flu can make you very ill. And it can lead to pneumonia, a serious lung infection. For some people, especially older adults, young children, and people with certain chronic conditions, the flu can have serious complications and even be fatal.  How Does the Flu Spread?  The flu is caused by viruses. The viruses spread through the air in droplets when someone who has the flu coughs, sneezes, laughs, or talks. You can become infected when you inhale these viruses directly. You can also become infected when you touch a surface on which the droplets have landed and then transfer the germs to your eyes, nose, or mouth. Touching used tissues, or sharing utensils, drinking glasses, or a toothbrush with an infected person can expose you to flu viruses, too.  What Are the Symptoms of the Flu?  Flu symptoms tend to come on quickly and may last a few days to a few weeks. They include:  Fever usually higher than 101 F  (38.3 C) and chills  Sore throat and headache  Dry cough  Runny nose  Tiredness and weakness  Muscle aches  Factors That Can Make Flu Worse  For some people, the flu can be very serious. The risk of complications is greater for:  Children under age 5.  Adults 65 years of age and older.  People with a chronic illness, such as diabetes or  heart, kidney, or lung disease.  People who live in a nursing home or long-term care facility.   How Is the Flu Treated?  Influenza usually improves after 7 days or so. In some cases, your health care provider may prescribe an antiviral medication. This may help you get well sooner. For the medication to help, you need to take it as soon as possible (ideally within 48 hours) after your symptoms start. If you develop pneumonia or other serious illness, hospital care may be needed.  Easing Flu Symptoms  Drink lots of fluids such as water, juice, and warm soup. A good rule is to drink enough so that you urinate your normal amount.  Get plenty of rest.  Ask your health care provider what to take for fever and pain.  Call your provider if your fever rises over 101 F (38.3 C) or you become dizzy, lightheaded, or short of breath.

## 2022-11-22 NOTE — LETTER
November 22, 2022      Tara Sorenson  [address] [place] [id number]        To Whom It May Concern:    Tara Sorenson was seen on 11/22/21.  Please excuse her absences from work until she if fever free for 24 hours. Expected time away is 4-7 days.         Sincerely,        Mi Traore PA-C

## 2022-12-07 DIAGNOSIS — F90.2 ATTENTION DEFICIT HYPERACTIVITY DISORDER (ADHD), COMBINED TYPE: ICD-10-CM

## 2022-12-08 RX ORDER — DEXTROAMPHETAMINE SACCHARATE, AMPHETAMINE ASPARTATE MONOHYDRATE, DEXTROAMPHETAMINE SULFATE AND AMPHETAMINE SULFATE 5; 5; 5; 5 MG/1; MG/1; MG/1; MG/1
40 CAPSULE, EXTENDED RELEASE ORAL DAILY
Qty: 60 CAPSULE | Refills: 0 | Status: SHIPPED | OUTPATIENT
Start: 2022-12-08 | End: 2023-01-09

## 2022-12-08 RX ORDER — DEXTROAMPHETAMINE SACCHARATE, AMPHETAMINE ASPARTATE, DEXTROAMPHETAMINE SULFATE AND AMPHETAMINE SULFATE 5; 5; 5; 5 MG/1; MG/1; MG/1; MG/1
20 TABLET ORAL DAILY
Qty: 30 TABLET | Refills: 0 | Status: SHIPPED | OUTPATIENT
Start: 2022-12-08 | End: 2023-01-09

## 2023-01-09 DIAGNOSIS — F90.2 ATTENTION DEFICIT HYPERACTIVITY DISORDER (ADHD), COMBINED TYPE: ICD-10-CM

## 2023-01-09 DIAGNOSIS — R51.9 NONINTRACTABLE HEADACHE, UNSPECIFIED CHRONICITY PATTERN, UNSPECIFIED HEADACHE TYPE: ICD-10-CM

## 2023-01-09 RX ORDER — DEXTROAMPHETAMINE SACCHARATE, AMPHETAMINE ASPARTATE MONOHYDRATE, DEXTROAMPHETAMINE SULFATE AND AMPHETAMINE SULFATE 5; 5; 5; 5 MG/1; MG/1; MG/1; MG/1
40 CAPSULE, EXTENDED RELEASE ORAL DAILY
Qty: 60 CAPSULE | Refills: 0 | Status: SHIPPED | OUTPATIENT
Start: 2023-01-09 | End: 2023-02-10

## 2023-01-09 RX ORDER — IBUPROFEN 600 MG/1
600 TABLET, FILM COATED ORAL EVERY 8 HOURS PRN
Qty: 60 TABLET | Refills: 1 | Status: SHIPPED | OUTPATIENT
Start: 2023-01-09 | End: 2023-02-10

## 2023-01-09 RX ORDER — DEXTROAMPHETAMINE SACCHARATE, AMPHETAMINE ASPARTATE, DEXTROAMPHETAMINE SULFATE AND AMPHETAMINE SULFATE 5; 5; 5; 5 MG/1; MG/1; MG/1; MG/1
20 TABLET ORAL DAILY
Qty: 30 TABLET | Refills: 0 | Status: SHIPPED | OUTPATIENT
Start: 2023-01-09 | End: 2023-02-10

## 2023-01-26 ENCOUNTER — OFFICE VISIT (OUTPATIENT)
Dept: PODIATRY | Facility: CLINIC | Age: 33
End: 2023-01-26
Payer: COMMERCIAL

## 2023-01-26 VITALS
DIASTOLIC BLOOD PRESSURE: 62 MMHG | HEIGHT: 63 IN | WEIGHT: 104 LBS | BODY MASS INDEX: 18.43 KG/M2 | SYSTOLIC BLOOD PRESSURE: 102 MMHG

## 2023-01-26 DIAGNOSIS — M79.671 FOOT PAIN, BILATERAL: Primary | ICD-10-CM

## 2023-01-26 DIAGNOSIS — L60.0 INGROWN NAIL OF GREAT TOE OF LEFT FOOT: ICD-10-CM

## 2023-01-26 DIAGNOSIS — M79.672 FOOT PAIN, BILATERAL: Primary | ICD-10-CM

## 2023-01-26 DIAGNOSIS — L60.0 INGROWN NAIL OF GREAT TOE OF RIGHT FOOT: ICD-10-CM

## 2023-01-26 PROCEDURE — 11730 AVULSION NAIL PLATE SIMPLE 1: CPT | Mod: T5 | Performed by: PODIATRIST

## 2023-01-26 PROCEDURE — 11732 AVLSN NAIL PLATE SIMPLE EACH: CPT | Mod: TA | Performed by: PODIATRIST
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
## 2023-01-26 NOTE — PATIENT INSTRUCTIONS
Thank you for choosing [facility] Podiatry / Foot & Ankle Surgery!    DR VALLE[facility]S CLINIC:  [facility]   [address]   [place] [address]      TRIAGE LINE: [phone]  APPOINTMENTS: [phone]  RADIOLOGY: [phone]  SET UP SURGERY: [phone]  PHYSICAL THERAPY: [phone]   FAX NUMBER: [phone]  BILLING QUESTIONS: [phone]       Follow up: As needed      INGROWN TOENAILS  When a toenail is ingrown, it is curved and grows into the skin, usually at the nail borders (the sides of the nail). This  digging in  of the nail irritates the skin, often creating pain, redness, swelling, and warmth in the toe.  If an ingrown nail causes a break in the skin, bacteria may enter and cause an infection in the area, which is often marked by drainage and a foul odor. However, even if the toe isn t painful, red, swollen, or warm, a nail that curves downward into the skin can progress to an infection.  CAUSES:  Heredity: In many people, the tendency for ingrown toenails is inherited.   Trauma: Sometimes an ingrown toenail is the result of trauma, such as stubbing your toe, having an object fall on your toe, or engaging in activities that involve repeated pressure on the toes, such as kicking or running.   Improper Trimming:  The most common cause of ingrown toenails is cutting your nails too short. This encourages the skin next to the nail to fold over the nail.   Improperly Sized Footwear: Ingrown toenails can result from wearing socks and shoes that are tight or short.   Nail Conditions: Ingrown toenails can be caused by nail problems, such as fungal infections or losing a nail due to trauma.   TREATMENT: Sometimes initial treatment for ingrown toenails can be safely performed at home. However, home treatment is strongly discouraged if an infection is suspected, or for those who have medical conditions that put feet at high risk, such as diabetes, nerve damage in the foot, or poor  circulation.  Home care: If you don t have an infection or any of the above medical conditions, you can soak your foot in room-temperature water (adding Epsom s salt may be recommended by your doctor), and gently massage the side of the nail fold to help reduce the inflammation.  Avoid attempting  bathroom surgery.  Repeated cutting of the nail can cause the condition to worsen over time. If your symptoms fail to improve, it s time to see a foot and ankle surgeon.  Physician care: After examining the toe, the foot and ankle surgeon will select the treatment best suited for you. If an infection is present, an oral antibiotic may be prescribed.  Sometimes a minor surgical procedure, often performed in the office, will ease the pain and remove the offending nail. After applying a local anesthetic, the doctor removes part of the nail s side border. Some nails may become ingrown again, requiring removal of the nail root.  Following the nail procedure, a light bandage will be applied. Most people experience very little pain after surgery and may resume normal activity the next day. If your surgeon has prescribed an oral antibiotic, be sure to take all the medication, even if your symptoms have improved.  PREVENTION:  Proper Trimming: Cut toenails in a fairly straight line, and don t cut them too short. You should be able to get your fingernail under the sides and end of the nail.   Well-fitting Footwear: Don t wear shoes that are short or tight in the toe area. Avoid shoes that are loose, because they too cause pressure on the toes, especially when running or walking briskly.     INGROWN TOENAIL REMOVAL AFTERCARE   Go directly home and elevate the affected foot on one or two pillows for the remainder of the day/evening if possible. Your toe may stay numb anywhere from 2-8 hours.   Take Tylenol, ibuprofen or another anti-inflammatory as needed for pain.   Take antibiotic if that has been prescribed. Finish the entire  prescribed antibiotic even if your symptoms have improved.   The evening of the procedure, soak/wash the affected area in warm water (you may add Epsom salt) for 5 to 10 minutes. Do this twice a day for 2-4 weeks (6-8 weeks if you had phenol) (you may count showering/bathing as one soak).  After soaks, pat the area dry and then allow to airdry for a few minutes. Apply antibiotic ointment to the area and cover with 2 X 2 gauze and paper tape or band-aid.  You may pursue everyday activities as tolerated with either an open toe shoe or cut-out shoe as needed or you may wear regular shoes if no pain is noted.  Watch for any signs and symptoms of infection such as: redness, red streaks going up the foot/leg, swelling, pus or foul odor. Those that have had the phenol procedure, the toe will drain longer and will look like it is infected because it is a chemical burn.   Please call with questions.
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
## 2023-01-31 ENCOUNTER — NURSE TRIAGE (OUTPATIENT)
Dept: NURSING | Facility: CLINIC | Age: 33
End: 2023-01-31
Payer: COMMERCIAL

## 2023-01-31 NOTE — TELEPHONE ENCOUNTER
Nurse Triage SBAR    Is this a 2nd Level Triage? NO    Situation: Patient calling with headache, neck/upper back pain, and nausea.  Consent: not needed    Background: Patient was at work yesterday when her stomach started hurting. Patient  went to the bathroom and had runny stools, felt better for awhile and then felt stomach cramping and had another loose stool.     Flu - last November    Assessment:   Neck, shoulders, and upper back ache - rates pain 10/10  Headache - rates pain 4-7/10  Stiff neck - unable to touch chin to chest  Severe nausea and stomach cramps  No more diarrhea  Unsure about fever- has chills    Protocol Recommended Disposition:   Go to ED/UCC Now (or to office with PCP approval)    Recommendation: Advised patient to go to ED or Urgent care now. Care advice given. Patient verbalized understanding and agreed with plan. Patient will go to [facility].    Shannan Cardona RN [facility] 1/31/2023 1:23 PM    Reason for Disposition    Unexplained nausea    Stiff neck (can't touch chin to chest) and has headache    Additional Information    Negative: Shock suspected (e.g., cold/pale/clammy skin, too weak to stand, low BP, rapid pulse)    Negative: Sounds like a life-threatening emergency to the triager    Negative: Nausea or vomiting and pregnancy < 20 weeks    Negative: Menstrual Period - Missed or Late (i.e., pregnancy suspected)    Negative: Heat exhaustion suspected (i.e., dehydration from heat exposure)    Negative: Anxiety or stress suspected (i.e., nausea with anxiety attacks or stressful situations)    Negative: Traumatic Brain Injury (TBI) suspected    Negative: Vomiting occurs    Negative: Other symptom is present, see that guideline.  (e.g., chest pain, headache, dizziness, abdominal pain, colds, sore throat, etc.).    Negative: Unable to walk, or can only walk with assistance (e.g., requires support)    Negative: Difficulty breathing    Negative: Insulin-dependent  diabetes (Type I) and glucose > 400 mg/dL (22 mmol/L)    Negative: Drinking very little and dehydration suspected (e.g., no urine > 12 hours, very dry mouth, very lightheaded)    Negative: Patient sounds very sick or weak to the triager    Negative: Fever > 104 F  (40 C)    Negative: Fever > 101 F  (38.3 C) and over 60 years of age    Negative: Fever > 100.0 F  (37.8 C) and bedridden (e.g., nursing home patient, CVA, chronic illness, recovering from surgery)    Negative: Fever > 100.0 F  (37.8 C) and diabetes mellitus or weak immune system (e.g., HIV positive, cancer chemo, splenectomy, chronic steroids)    Negative: Taking any of the following medications: digoxin (Lanoxin), lithium, theophylline, phenytoin (Dilantin)    Negative: Yellowish color of the skin or white of the eye (i.e., jaundice)    Negative: Fever present > 3 days (72 hours)    Negative: Patient wants to be seen    Negative: Receiving cancer chemotherapy medication    Negative: Taking prescription medication that could cause nausea (e.g., narcotics/opiates, antibiotics, OCPs, many others)    Negative: Nausea lasts > 1 week    Negative: Substance use (drug use) or unhealthy alcohol use, known or suspected    Negative: Nausea is a chronic symptom (recurrent or ongoing AND present > 4 weeks)    Negative: Shock suspected (e.g., cold/pale/clammy skin, too weak to stand, low BP, rapid pulse)    Negative: Similar pain previously and it was from 'heart attack'    Negative: Similar pain previously from 'angina' and not relieved by nitroglycerin    Negative: Difficult to awaken or acting confused (e.g., disoriented, slurred speech)    Negative: Sounds like a life-threatening emergency to the triager    Negative: Followed an injury to neck (e.g., MVA, sports, impact or collision)    Negative: Chest pain    Negative: Lymph node in the neck is swollen or painful to the touch    Negative: Sore throat is main symptom    Negative: Chest pain lasting longer than 5  minutes    Negative: Difficulty breathing or unusual sweating (e.g., sweating without exertion)    Protocols used: NAUSEA-A-OH, NECK PAIN OR [id number]-M-LS

## 2023-02-08 ENCOUNTER — TELEPHONE (OUTPATIENT)
Dept: NURSING | Facility: CLINIC | Age: 33
End: 2023-02-08
Payer: COMMERCIAL

## 2023-02-08 DIAGNOSIS — F51.01 PRIMARY INSOMNIA: ICD-10-CM

## 2023-02-08 DIAGNOSIS — R51.9 NONINTRACTABLE HEADACHE, UNSPECIFIED CHRONICITY PATTERN, UNSPECIFIED HEADACHE TYPE: ICD-10-CM

## 2023-02-08 DIAGNOSIS — F90.2 ATTENTION DEFICIT HYPERACTIVITY DISORDER (ADHD), COMBINED TYPE: ICD-10-CM

## 2023-02-09 NOTE — TELEPHONE ENCOUNTER
Pt calling with medication refill request;    Amphetamine 20mg XR  adderall 20mg  Ibuprofen  seroquel     Pt has upcoming appt with Giuliana Sullivan at [facility] but will need refills as soon as possible.    Angelika Casarez RN, Nurse Advisor 9:29 PM 2/8/2023

## 2023-02-09 NOTE — TELEPHONE ENCOUNTER
"Former patient of Tim & has not established care with another provider.  Please assign refill request to covering provider per clinic standard process.      Routing refill request to provider for review/approval because:  Labs not current:      Last Written Prescription Date:  3/17/22  Last Fill Quantity: 90,  # refills: 1   Last office visit provider:  11/2/22     Requested Prescriptions   Pending Prescriptions Disp Refills     amphetamine-dextroamphetamine (ADDERALL XR) 20 MG 24 hr capsule 60 capsule 0     Sig: Take 2 capsules (40 mg) by mouth daily       There is no refill protocol information for this order        amphetamine-dextroamphetamine (ADDERALL) 20 MG tablet 30 tablet 0     Sig: Take 1 tablet (20 mg) by mouth daily As needed in the PM for attention deficit therapy boost       There is no refill protocol information for this order        ibuprofen (ADVIL/MOTRIN) 600 MG tablet 60 tablet 1     Sig: Take 1 tablet (600 mg) by mouth every 8 hours as needed for moderate pain (4-6) Or migraine       NSAID Medications Failed - 2/8/2023  9:31 PM        Failed - Recent (12 mo) or future (30 days) visit within the authorizing provider's specialty     Patient has had an office visit with the authorizing provider or a provider within the authorizing providers department within the previous 12 mos or has a future within next 30 days. See \"Patient Info\" tab in inbasket, or \"Choose Columns\" in Meds & Orders section of the refill encounter.              Failed - Normal CBC on file in past 12 months     Recent Labs   Lab Test 02/09/21  1456   WBC 5.9   RBC 4.18   HGB 13.1   HCT 39.6                    Failed - Normal serum creatinine on file in past 12 months     Recent Labs   Lab Test 02/09/21  1456   CR 0.73       Ok to refill medication if creatinine is low          Passed - Blood pressure under 140/90 in past 12 months     BP Readings from Last 3 Encounters:   01/26/23 102/62   11/22/22 107/72   07/15/22 " "                 Passed - Normal ALT on file in past 12 months     Recent Labs   Lab Test 07/15/22  1053   ALT 13             Passed - Normal AST on file in past 12 months     Recent Labs   Lab Test 07/15/22  1053   AST 25             Passed - Patient is age 6-64 years        Passed - Medication is active on med list        Passed - No active pregnancy on record        Passed - No positive pregnancy test in past 12 months           QUEtiapine (SEROQUEL) 25 MG tablet 90 tablet 1     Si/2 to 2 po HS for insomnia       Antipsychotic Medications Failed - 2023  9:31 PM        Failed - Lipid panel on file within the past 12 months     No lab results found.            Failed - CBC on file in past 12 months     Recent Labs   Lab Test 21  1456   WBC 5.9   RBC 4.18   HGB 13.1   HCT 39.6                    Failed - A1c or Glucose on file in past 12 months     Recent Labs   Lab Test 21  1456   GLC 80       Please review patients last 3 weights. If a weight gain of >10 lbs exists, you may refill the prescription once after instructing the patient to schedule an appointment within the next 30 days.    Wt Readings from Last 3 Encounters:   23 47.2 kg (104 lb)   22 47.2 kg (104 lb 1.6 oz)   22 42.6 kg (94 lb)             Failed - Recent (6 mo) or future (30 days) visit within the authorizing provider's specialty     Patient had office visit in the last 6 months or has a visit in the next 30 days with authorizing provider or within the authorizing provider's specialty.  See \"Patient Info\" tab in inbasket, or \"Choose Columns\" in Meds & Orders section of the refill encounter.            Passed - Blood pressure under 140/90 in past 12 months     BP Readings from Last 3 Encounters:   23 102/62   22 107/72   07/15/22 98/62                 Passed - Patient is 12 years of age or older        Passed - Heart Rate on file within past 12 months     Pulse Readings from Last 3 " Encounters:   11/22/22 86   07/15/22 80   04/11/22 87               Passed - Medication is active on med list        Passed - Patient is not pregnant        Passed - No positve pregnancy test on file in past 12 months             Leydi Diaz, RN 02/09/23 8:07 AM

## 2023-02-10 ENCOUNTER — TELEPHONE (OUTPATIENT)
Dept: INTERNAL MEDICINE | Facility: CLINIC | Age: 33
End: 2023-02-10
Payer: COMMERCIAL

## 2023-02-10 RX ORDER — DEXTROAMPHETAMINE SACCHARATE, AMPHETAMINE ASPARTATE, DEXTROAMPHETAMINE SULFATE AND AMPHETAMINE SULFATE 5; 5; 5; 5 MG/1; MG/1; MG/1; MG/1
20 TABLET ORAL DAILY
Qty: 30 TABLET | Refills: 0 | Status: SHIPPED | OUTPATIENT
Start: 2023-02-10 | End: 2023-03-14

## 2023-02-10 RX ORDER — QUETIAPINE FUMARATE 25 MG/1
TABLET, FILM COATED ORAL
Qty: 90 TABLET | Refills: 1 | Status: SHIPPED | OUTPATIENT
Start: 2023-02-10 | End: 2023-06-01

## 2023-02-10 RX ORDER — IBUPROFEN 600 MG/1
600 TABLET, FILM COATED ORAL EVERY 8 HOURS PRN
Qty: 60 TABLET | Refills: 1 | Status: SHIPPED | OUTPATIENT
Start: 2023-02-10 | End: 2023-07-21

## 2023-02-10 RX ORDER — DEXTROAMPHETAMINE SACCHARATE, AMPHETAMINE ASPARTATE MONOHYDRATE, DEXTROAMPHETAMINE SULFATE AND AMPHETAMINE SULFATE 5; 5; 5; 5 MG/1; MG/1; MG/1; MG/1
40 CAPSULE, EXTENDED RELEASE ORAL DAILY
Qty: 60 CAPSULE | Refills: 0 | Status: SHIPPED | OUTPATIENT
Start: 2023-02-10 | End: 2023-03-31

## 2023-02-16 NOTE — TELEPHONE ENCOUNTER
Central Prior Authorization Team   Phone: [phone]    PA Initiation    Medication: Adderall XR 20MG ER  Insurance Company: [facility] - Phone [phone] Fax [phone]  Pharmacy Filling the Rx: [facility] #[id number] [place] [address] AT [place] [address]  Filling Pharmacy Phone: [phone]  Filling Pharmacy Fax:    Start Date: 2/16/2023

## 2023-02-17 NOTE — TELEPHONE ENCOUNTER
Prior Authorization Approval    Authorization Effective Date: 1/1/2023  Authorization Expiration Date: 2/16/2024  Medication: Adderall XR 20MG ER  Approved Dose/Quantity:    Reference #:     Insurance Company: [employer] [place] - Phone [phone] Fax [phone]  Expected CoPay:       CoPay Card Available:      Foundation Assistance Needed:    Which Pharmacy is filling the prescription (Not needed for infusion/clinic administered): [facility] [address] [place] [address] [address] AT [place] [address]  Pharmacy Notified: Yes  Patient Notified: Comment:  Pharmacy will notify patient

## 2023-03-13 DIAGNOSIS — F90.2 ATTENTION DEFICIT HYPERACTIVITY DISORDER (ADHD), COMBINED TYPE: ICD-10-CM

## 2023-03-14 RX ORDER — DEXTROAMPHETAMINE SACCHARATE, AMPHETAMINE ASPARTATE, DEXTROAMPHETAMINE SULFATE AND AMPHETAMINE SULFATE 5; 5; 5; 5 MG/1; MG/1; MG/1; MG/1
20 TABLET ORAL DAILY
Qty: 30 TABLET | Refills: 0 | Status: SHIPPED | OUTPATIENT
Start: 2023-03-14 | End: 2023-03-23

## 2023-04-03 ASSESSMENT — ANXIETY QUESTIONNAIRES
5. BEING SO RESTLESS THAT IT IS HARD TO SIT STILL: NEARLY EVERY DAY
7. FEELING AFRAID AS IF SOMETHING AWFUL MIGHT HAPPEN: NEARLY EVERY DAY
1. FEELING NERVOUS, ANXIOUS, OR ON EDGE: NEARLY EVERY DAY
3. WORRYING TOO MUCH ABOUT DIFFERENT THINGS: NEARLY EVERY DAY
GAD7 TOTAL SCORE: 19
GAD7 TOTAL SCORE: 19
2. NOT BEING ABLE TO STOP OR CONTROL WORRYING: NEARLY EVERY DAY
IF YOU CHECKED OFF ANY PROBLEMS ON THIS QUESTIONNAIRE, HOW DIFFICULT HAVE THESE PROBLEMS MADE IT FOR YOU TO DO YOUR WORK, TAKE CARE OF THINGS AT HOME, OR GET ALONG WITH OTHER PEOPLE: SOMEWHAT DIFFICULT
GAD7 TOTAL SCORE: 19
6. BECOMING EASILY ANNOYED OR IRRITABLE: SEVERAL DAYS
8. IF YOU CHECKED OFF ANY PROBLEMS, HOW DIFFICULT HAVE THESE MADE IT FOR YOU TO DO YOUR WORK, TAKE CARE OF THINGS AT HOME, OR GET ALONG WITH OTHER PEOPLE?: SOMEWHAT DIFFICULT
7. FEELING AFRAID AS IF SOMETHING AWFUL MIGHT HAPPEN: NEARLY EVERY DAY
4. TROUBLE RELAXING: NEARLY EVERY DAY

## 2023-04-03 ASSESSMENT — PATIENT HEALTH QUESTIONNAIRE - PHQ9
SUM OF ALL RESPONSES TO PHQ QUESTIONS 1-9: 15
10. IF YOU CHECKED OFF ANY PROBLEMS, HOW DIFFICULT HAVE THESE PROBLEMS MADE IT FOR YOU TO DO YOUR WORK, TAKE CARE OF THINGS AT HOME, OR GET ALONG WITH OTHER PEOPLE: SOMEWHAT DIFFICULT
SUM OF ALL RESPONSES TO PHQ QUESTIONS 1-9: 15

## 2023-04-07 ENCOUNTER — VIRTUAL VISIT (OUTPATIENT)
Dept: FAMILY MEDICINE | Facility: CLINIC | Age: 33
End: 2023-04-07
Payer: COMMERCIAL

## 2023-04-07 DIAGNOSIS — F43.10 PTSD (POST-TRAUMATIC STRESS DISORDER): ICD-10-CM

## 2023-04-07 DIAGNOSIS — F31.62 BIPOLAR DISORDER, CURRENT EPISODE MIXED, MODERATE (H): Primary | ICD-10-CM

## 2023-04-07 DIAGNOSIS — F41.1 GAD (GENERALIZED ANXIETY DISORDER): ICD-10-CM

## 2023-04-07 DIAGNOSIS — F20.89 OTHER SCHIZOPHRENIA (H): ICD-10-CM

## 2023-04-07 DIAGNOSIS — F42.2 MIXED OBSESSIONAL THOUGHTS AND ACTS: ICD-10-CM

## 2023-04-07 DIAGNOSIS — F33.2 MDD (MAJOR DEPRESSIVE DISORDER), RECURRENT SEVERE, WITHOUT PSYCHOSIS (H): ICD-10-CM

## 2023-04-07 DIAGNOSIS — F90.2 ATTENTION DEFICIT HYPERACTIVITY DISORDER (ADHD), COMBINED TYPE: ICD-10-CM

## 2023-04-07 PROCEDURE — 96127 BRIEF EMOTIONAL/BEHAV ASSMT: CPT | Mod: VID | Performed by: FAMILY MEDICINE

## 2023-04-07 PROCEDURE — 99215 OFFICE O/P EST HI 40 MIN: CPT | Mod: VID | Performed by: FAMILY MEDICINE

## 2023-04-07 RX ORDER — DEXTROAMPHETAMINE SACCHARATE, AMPHETAMINE ASPARTATE, DEXTROAMPHETAMINE SULFATE AND AMPHETAMINE SULFATE 5; 5; 5; 5 MG/1; MG/1; MG/1; MG/1
20 TABLET ORAL DAILY
Qty: 30 TABLET | Refills: 0 | Status: SHIPPED | OUTPATIENT
Start: 2023-04-07 | End: 2023-05-18

## 2023-04-07 RX ORDER — DEXTROAMPHETAMINE SACCHARATE, AMPHETAMINE ASPARTATE MONOHYDRATE, DEXTROAMPHETAMINE SULFATE AND AMPHETAMINE SULFATE 5; 5; 5; 5 MG/1; MG/1; MG/1; MG/1
40 CAPSULE, EXTENDED RELEASE ORAL DAILY
Qty: 60 CAPSULE | Refills: 0 | Status: SHIPPED | OUTPATIENT
Start: 2023-04-07 | End: 2023-05-19

## 2023-04-07 ASSESSMENT — ANXIETY QUESTIONNAIRES: GAD7 TOTAL SCORE: 19

## 2023-04-07 ASSESSMENT — PATIENT HEALTH QUESTIONNAIRE - PHQ9
10. IF YOU CHECKED OFF ANY PROBLEMS, HOW DIFFICULT HAVE THESE PROBLEMS MADE IT FOR YOU TO DO YOUR WORK, TAKE CARE OF THINGS AT HOME, OR GET ALONG WITH OTHER PEOPLE: SOMEWHAT DIFFICULT
SUM OF ALL RESPONSES TO PHQ QUESTIONS 1-9: 15

## 2023-04-07 NOTE — PATIENT INSTRUCTIONS
Typically amphetamines make OCD worse. I understand your concerns about reducing it, but you might try not taking the immediate release. If that is okay, maybe try taking one XR and the IR only. You might find the OCD symptoms improving.    The psychiatrist can help balance the medications for all the diagnoses and symptoms. Counseling is also a main way to treat OCD.

## 2023-04-07 NOTE — PROGRESS NOTES
Tara is a 33 year old who is being evaluated via a billable video visit.      How would you like to obtain your AVS? MyChart  If the video visit is dropped, the invitation should be resent by: Text to cell phone: [phone]  Will anyone else be joining your video visit? No    Assessment & Plan     Attention deficit hyperactivity disorder (ADHD), combined type  She does not want to change her amphetamine dose, though I explained it may contribute to her OCD.  - amphetamine-dextroamphetamine (ADDERALL XR) 20 MG 24 hr capsule  Dispense: 60 capsule; Refill: 0  - Adult Mental Health  Referral  - amphetamine-dextroamphetamine (ADDERALL) 20 MG tablet  Dispense: 30 tablet; Refill: 0    Bipolar disorder, current episode mixed, moderate (H)  She is on Seroquel 25 and lamictal 200 mg daily.  - Adult Mental Health  Referral    Other schizophrenia (H)  Seroquel.  The patient questions that diagnosis, noting she received that diagnosis at age 18 yo. She denies using drugs at that time. (There is the diagnosis of methamphetamine abuse on her chart.)  - Adult Mental Health  Referral    AURELIO (generalized anxiety disorder)  She does not have agoraphobia, but describes the urge to not leave the house.  - Adult Mental Health  Referral    MDD (major depressive disorder), recurrent severe, without psychosis (H)  - Adult Mental Health  Referral    PTSD (post-traumatic stress disorder)  - Adult Mental Health  Referral    Mixed obsessional thoughts and acts  - Adult Mental Health  Referral    Tara is disappointed that I feel her psychopathy warrants specialty care. But she is on 6 psych meds and carries 7 psychiatric diagnoses. Her care is complicated.     Nicotine/Tobacco Cessation:  She reports that she has been smoking cigarettes. She has been smoking an average of .25 packs per day. She has never used smokeless tobacco.  Nicotine/Tobacco Cessation Plan:   Information  "offered: Patient not interested at this time    TRAVIS IRVIN MD  [facility]    Emmy Pacheco is a 33 year old, presenting for the following health issues:  Recheck Medication, Medication Follow-up (Needs refills), and Establish Care        4/7/2023     6:52 AM   Additional Questions   Roomed by          4/7/2023     6:52 AM   Patient Reported Additional Medications   Patient reports taking the following new medications none     Here to extablish care with someone. She was with Dr Dumont since 2011. Her psychiatrist left around 2019 and Dr Dumont took over her psychiatric care.  Calls in for the refills of Adderall. 2 20 mg capsules and a booster. Yesterday, she realized she only got #30.  The lamictal is for migraines and bipolar. It allowed her to go back to work after not working for three years. She has been back to work for4 years.   She feels the lamictal should be adjusted for her bipolar symptoms: \"Having my highs and my lows.They are frequent. She has little interest in doing anything.\" She reports her anxiety is bad. She only leaves the house for work and getting her kids to and from school. She is always afraid something bad will happen. She feels sheis hard to get along with, though she notes she is a good mother.She reports she is tired and down. Feels \"angry, but not angry.\" (I suggested irritable.)  \"I have really bad OCD.\" hecks her doors multiple times,she may get up 12 times at night. She lives in a safe and secure townhouse, and has a security symptoms. Won't let her kids keep things plugged in and wires can't cross. She checks her electric stove. She freaks if someone else washes her dishes and if someone touches the clothes before she folds them, she will rewash them. If someone washes and puts things away, she will take it all out and rewash all of it (dishes or clothes.) She sorts Leggos. She checks that all five kids are breathing, putting her hand on " their chest and has to feel them breath 3 times.  She feels she cannot slow her thoughts down without Adderall.  She consistently takes 40mg XR and 10 mg IR Adderall daily.    History of Present Illness       Mental Health Follow-up:  Patient presents to follow-up on Depression & Anxiety.Patient's depression since last visit has been:  Bad  The patient is having other symptoms associated with depression.  Patient's anxiety since last visit has been:  Worse  The patient is having other symptoms associated with anxiety.  Any significant life events: relationship concerns and financial concerns  Patient is feeling anxious or having panic attacks.  Patient has no concerns about alcohol or drug use.    She eats 0-1 servings of fruits and vegetables daily.She consumes 2 sweetened beverage(s) daily.She exercises with enough effort to increase her heart rate 20 to 29 minutes per day.  She exercises with enough effort to increase her heart rate 5 days per week. She is missing 1 dose(s) of medications per week.  She is not taking prescribed medications regularly due to remembering to take.    Today's PHQ-9         PHQ-9 Total Score: 15    PHQ-9 Q9 Thoughts of better off dead/self-harm past 2 weeks :   Not at all    How difficult have these problems made it for you to do your work, take care of things at home, or get along with other people: Somewhat difficult  Today's AURELIO-7 Score: 19     Review of Systems       Objective    Vitals - Patient Reported  Pain Score: No Pain (0)    Physical Exam   GENERAL: Healthy, alert and no distress  EYES: Eyes grossly normal to inspection.  No discharge or erythema, or obvious scleral/conjunctival abnormalities.  RESP: No audible wheeze, cough, or visible cyanosis.  No visible retractions or increased work of breathing.    SKIN: Visible skin clear. No significant rash, abnormal pigmentation or lesions.  NEURO: Cranial nerves grossly intact.  Mentation and speech appropriate for age.  PSYCH:  Mentation appears normal, affect normal/bright, judgement and insight intact, normal speech and appearance well-groomed.    Office Visit on 11/22/2022   Component Date Value Ref Range Status     Influenza A antigen 11/22/2022 Positive (A)  Negative Final     Influenza B antigen 11/22/2022 Negative  Negative Final       Video-Visit Details    Type of service:  Video Visit   Video Start Time: 7:37  Video End Time:8:17   40 min total  Originating Location (pt. Location): Home  Distant Location (provider location):  On-site  Platform [facility]

## 2023-04-23 ENCOUNTER — HEALTH MAINTENANCE LETTER (OUTPATIENT)
Age: 33
End: 2023-04-23

## 2023-05-08 DIAGNOSIS — F90.2 ATTENTION DEFICIT HYPERACTIVITY DISORDER (ADHD), COMBINED TYPE: ICD-10-CM

## 2023-05-08 RX ORDER — DEXTROAMPHETAMINE SACCHARATE, AMPHETAMINE ASPARTATE MONOHYDRATE, DEXTROAMPHETAMINE SULFATE AND AMPHETAMINE SULFATE 5; 5; 5; 5 MG/1; MG/1; MG/1; MG/1
40 CAPSULE, EXTENDED RELEASE ORAL DAILY
Qty: 60 CAPSULE | Refills: 0 | OUTPATIENT
Start: 2023-05-08

## 2023-05-08 NOTE — TELEPHONE ENCOUNTER
She should be seeing psychiatry now.  I did refill 20 mg XR and IR, this request is for 80 mg Adderall a day. I do not prescribe that much.

## 2023-05-08 NOTE — TELEPHONE ENCOUNTER
Medication Question or Refill    Contacts       Type Contact Phone/Fax    05/08/2023 03:34 PM CDT Phone (Incoming) Tara Christianson (Self) [phone] (M)          What medication are you calling about (include dose and sig)?: Adderall 20 mg capsule    Preferred Pharmacy:  [id number] [address] [facility] [address] [facility] [address]  Phone: [phone] Fax: [phone]      Controlled Substance Agreement on file:   CSA -- Patient Level:     [Media Unavailable] Controlled Substance Agreement - Opioid - Scan on 4/21/2017       Who prescribed the medication?: Dr. Sullivan    Do you need a refill? Yes    When did you use the medication last? 5/7    Patient offered an appointment? No    Do you have any questions or concerns?  No      Could we send this information to you in [facility] or would you prefer to receive a phone call?:   Patient would prefer a phone call   Okay to leave a detailed message?: Yes at Home number on file [phone] (home) or Cell number on file:    Telephone Information:   Mobile [phone]

## 2023-05-18 RX ORDER — DEXTROAMPHETAMINE SACCHARATE, AMPHETAMINE ASPARTATE, DEXTROAMPHETAMINE SULFATE AND AMPHETAMINE SULFATE 5; 5; 5; 5 MG/1; MG/1; MG/1; MG/1
20 TABLET ORAL DAILY
Qty: 30 TABLET | Refills: 0 | Status: SHIPPED | OUTPATIENT
Start: 2023-05-18 | End: 2023-07-21

## 2023-05-18 RX ORDER — DEXTROAMPHETAMINE SACCHARATE, AMPHETAMINE ASPARTATE MONOHYDRATE, DEXTROAMPHETAMINE SULFATE AND AMPHETAMINE SULFATE 5; 5; 5; 5 MG/1; MG/1; MG/1; MG/1
40 CAPSULE, EXTENDED RELEASE ORAL DAILY
Qty: 60 CAPSULE | Refills: 0 | Status: CANCELLED | OUTPATIENT
Start: 2023-05-18

## 2023-05-19 RX ORDER — DEXTROAMPHETAMINE SACCHARATE, AMPHETAMINE ASPARTATE MONOHYDRATE, DEXTROAMPHETAMINE SULFATE AND AMPHETAMINE SULFATE 5; 5; 5; 5 MG/1; MG/1; MG/1; MG/1
40 CAPSULE, EXTENDED RELEASE ORAL DAILY
Qty: 60 CAPSULE | Refills: 0 | Status: SHIPPED | OUTPATIENT
Start: 2023-05-19 | End: 2023-07-21

## 2023-05-19 NOTE — TELEPHONE ENCOUNTER
Pt reports that at 4/7/23 appointment she wanted to add an anti-depressant medication and was encouraged encouraged her to see psychiatry.     Pt had appointment with Tasha this morning but she had a different position at work when she made this appointment. Now that she switched positions, she needs an appointment later in the day.     Pt has not yet been able to reschedule with Tasha. Pt will work on rescheduling with Tasha, she was encouraged to let [facility] know when this has been rescheduled.     Pt is requesting a refill of her adderall XR 20 mg capsule, 40 mg daily. Pt stated understanding that Dr. Sullivan is continuing her adderall dosing that she was at with Dr. Dumont.     Last dispense:      Dispense Date Outside Name Pharmacy Quantity Refills remaining Days supply Sig Source                    4/14/2023 ADDERALL XR 20 MG CP24 [facility] #[id number] AT [place] [address] [phone] 60.00 applicator  30  Surescripts (Claim History, PRIME, Ambulatory)     Authorized by: TRAVIS SULLIVAN

## 2023-05-19 NOTE — TELEPHONE ENCOUNTER
Referral Note - from 4/7 referral that is now closed due to outside system appointment:     Note  5/19/2023  2:02 PM     Appointment Date: 5/19/2023  Appointment Time: 9:10 AM  Location: [place], [place]  Shayy Kwok, CNP,PMHNP,RN  [address] [place]  [address] [place] [id number] [phone]         Giuliana Sullivan MD  to Behr, Pam J. DB    5/18/23  4:17 PM  She MUST see psychiatry. She was not on that much Adderall until Dr Dumont left and she started asking for early refills without being seen. I will continue Dr Dumont's dosing-- 20 mg XR, 20 mg IR Adderall each month. No early refills.   Thank you.   Giuliana Sullivan MD

## 2023-06-19 DIAGNOSIS — F90.2 ATTENTION DEFICIT HYPERACTIVITY DISORDER (ADHD), COMBINED TYPE: ICD-10-CM

## 2023-06-21 RX ORDER — DEXTROAMPHETAMINE SACCHARATE, AMPHETAMINE ASPARTATE MONOHYDRATE, DEXTROAMPHETAMINE SULFATE AND AMPHETAMINE SULFATE 5; 5; 5; 5 MG/1; MG/1; MG/1; MG/1
40 CAPSULE, EXTENDED RELEASE ORAL DAILY
Qty: 60 CAPSULE | Refills: 0 | OUTPATIENT
Start: 2023-06-21

## 2023-06-21 RX ORDER — DEXTROAMPHETAMINE SACCHARATE, AMPHETAMINE ASPARTATE, DEXTROAMPHETAMINE SULFATE AND AMPHETAMINE SULFATE 5; 5; 5; 5 MG/1; MG/1; MG/1; MG/1
20 TABLET ORAL DAILY
Qty: 30 TABLET | Refills: 0 | OUTPATIENT
Start: 2023-06-21

## 2023-06-21 NOTE — TELEPHONE ENCOUNTER
I am not comfortable taking care of her psychiatric needs. I have insisted she see psychiatry. From what I see, she has not made that appointment. I cannot keep filling these due to her complex psychiatric profile.

## 2023-06-27 DIAGNOSIS — G43.001 MIGRAINE WITHOUT AURA AND WITH STATUS MIGRAINOSUS, NOT INTRACTABLE: ICD-10-CM

## 2023-06-27 NOTE — TELEPHONE ENCOUNTER
"Patient was contacted regarding the need to see psychiatry for management of her medications.  Patient was provided with listening during the call.  Patient stated she has missed multiple appointments with psychiatrist and is not able to get an appointment at this time.  Patient insisted that Dr. Sullivan is not her PCP and when asked if she has scheduled to establish a new PCP, patient stated she has not and doesn't feel the need to do this as she isn't changing her medications.      Patient was advised that she needs to have an established PCP or psychiatrist manage her medications and monitor for safety and efficacy. Patient asked why she is being suddenly \"cut off\" from her medications.  Patient was reminded that at her appointment to establish care with Dr. Sullivan she was given a plan of care to follow-up with psychiatry due to her complex mental health needs.  Patient was given refills during her April appointment along with an additional month of refills in May to allow time to establish care with psychiatry for medication managment.  Patient asked writer to override the adderall denial and was advised that writer is not able to do that.      Patient ended the phone call.    Maggie Garcia M.A., LPN  Clinic Manager  [facility] [place]        "

## 2023-06-27 NOTE — TELEPHONE ENCOUNTER
Patient called to follow up on the status of her refill request, when writer explained that the medication had been denied by Dr. Sullivan and why, the patient got upset and would not allow the writer to get many words in.    Patient demanded that her medication be filled and that Dr. Sullivan had no right to deny her, her medication or tell her who she needs to be seen by or who she needs to get her medication from.  The patient said that the psychiatrist is booking out far and she doesn't have time to be seen because she works and has 5 kids.    Writer told patient that this would escalated to the clinic manager and have the clinic  her, due to the patient being aggressive over the phone, and not listening to what the writer was trying to explain.

## 2023-06-28 RX ORDER — TOPIRAMATE 25 MG/1
25 TABLET, FILM COATED ORAL DAILY
Qty: 90 TABLET | Refills: 3 | Status: SHIPPED | OUTPATIENT
Start: 2023-06-28 | End: 2023-07-21

## 2023-06-28 NOTE — TELEPHONE ENCOUNTER
"Routing refill request to provider for review/approval because:  Labs not current:  CBC, PLT  Original authorizing provider no longer with [facility].    Last Written Prescription Date:  6/20/2022  Last Fill Quantity: 90,  # refills: 3   Last office visit provider: 4/07/2023       Requested Prescriptions   Pending Prescriptions Disp Refills     topiramate (TOPAMAX) 25 MG tablet 90 tablet 3     Sig: Take 1 tablet (25 mg) by mouth daily       Anti-Seizure Meds Protocol  Failed - 6/28/2023 10:22 AM        Failed - Review Authorizing provider's last note.      Refer to last progress notes: confirm request is for original authorizing provider (cannot be through other providers).          Failed - Normal CBC on file in past 26 months     Recent Labs   Lab Test 02/09/21  1456   WBC 5.9   RBC 4.18   HGB 13.1   HCT 39.6                    Failed - Normal platelet count on file in past 26 months     Recent Labs   Lab Test 02/09/21  1456                  Passed - Recent (12 mo) or future (30 days) visit within the authorizing provider's specialty     Patient has had an office visit with the authorizing provider or a provider within the authorizing providers department within the previous 12 mos or has a future within next 30 days. See \"Patient Info\" tab in inbasket, or \"Choose Columns\" in Meds & Orders section of the refill encounter.              Passed - Normal ALT or AST on file in past 26 months     Recent Labs   Lab Test 07/15/22  1053   ALT 13     Recent Labs   Lab Test 07/15/22  1053   AST 25             Passed - Medication is active on med list        Passed - No active pregnancy on record        Passed - No positive pregnancy test in last 12 months             Patricia Fish RN 06/28/23 10:22 AM  "

## 2023-07-03 DIAGNOSIS — F31.9 BIPOLAR AFFECTIVE DISORDER, REMISSION STATUS UNSPECIFIED (H): ICD-10-CM

## 2023-07-03 DIAGNOSIS — G43.001 MIGRAINE WITHOUT AURA AND WITH STATUS MIGRAINOSUS, NOT INTRACTABLE: ICD-10-CM

## 2023-07-03 RX ORDER — LAMOTRIGINE 200 MG/1
200 TABLET ORAL DAILY
Qty: 90 TABLET | Refills: 3 | Status: CANCELLED | OUTPATIENT
Start: 2023-07-03

## 2023-07-03 NOTE — TELEPHONE ENCOUNTER
"Routing refill request to provider for review/approval because:  Labs not current:  multiple  Requires review of authorizing provider's note    Last Written Prescription Date:  6/20/2022  Last Fill Quantity: 90,  # refills: 3   Last office visit provider:  4/7/2023     Requested Prescriptions   Pending Prescriptions Disp Refills     lamoTRIgine (LAMICTAL) 200 MG tablet 90 tablet 3     Sig: Take 1 tablet (200 mg) by mouth daily Take 200 mg by mouth       Anti-Seizure Meds Protocol  Failed - 7/3/2023 12:01 PM        Failed - Review Authorizing provider's last note.      Refer to last progress notes: confirm request is for original authorizing provider (cannot be through other providers).          Failed - Normal CBC on file in past 26 months     Recent Labs   Lab Test 02/09/21  1456   WBC 5.9   RBC 4.18   HGB 13.1   HCT 39.6                    Failed - Normal platelet count on file in past 26 months     Recent Labs   Lab Test 02/09/21  1456                  Passed - Recent (12 mo) or future (30 days) visit within the authorizing provider's specialty     Patient has had an office visit with the authorizing provider or a provider within the authorizing providers department within the previous 12 mos or has a future within next 30 days. See \"Patient Info\" tab in inbasket, or \"Choose Columns\" in Meds & Orders section of the refill encounter.              Passed - Normal ALT or AST on file in past 26 months     Recent Labs   Lab Test 07/15/22  1053   ALT 13     Recent Labs   Lab Test 07/15/22  1053   AST 25             Passed - Medication is active on med list        Passed - No active pregnancy on record        Passed - No positive pregnancy test in last 12 months             Alma Delia Chiu RN 07/03/23 3:55 PM  "

## 2023-07-05 ENCOUNTER — TELEPHONE (OUTPATIENT)
Dept: FAMILY MEDICINE | Facility: CLINIC | Age: 33
End: 2023-07-05
Payer: COMMERCIAL

## 2023-07-05 DIAGNOSIS — F31.9 BIPOLAR AFFECTIVE DISORDER, REMISSION STATUS UNSPECIFIED (H): ICD-10-CM

## 2023-07-05 DIAGNOSIS — Z13.228 SCREENING FOR ENDOCRINE, NUTRITIONAL, METABOLIC AND IMMUNITY DISORDER: Primary | ICD-10-CM

## 2023-07-05 DIAGNOSIS — Z13.29 SCREENING FOR ENDOCRINE, NUTRITIONAL, METABOLIC AND IMMUNITY DISORDER: Primary | ICD-10-CM

## 2023-07-05 DIAGNOSIS — Z13.21 SCREENING FOR ENDOCRINE, NUTRITIONAL, METABOLIC AND IMMUNITY DISORDER: Primary | ICD-10-CM

## 2023-07-05 DIAGNOSIS — G43.001 MIGRAINE WITHOUT AURA AND WITH STATUS MIGRAINOSUS, NOT INTRACTABLE: ICD-10-CM

## 2023-07-05 DIAGNOSIS — Z13.0 SCREENING FOR ENDOCRINE, NUTRITIONAL, METABOLIC AND IMMUNITY DISORDER: Primary | ICD-10-CM

## 2023-07-05 RX ORDER — LAMOTRIGINE 200 MG/1
200 TABLET ORAL DAILY
Qty: 30 TABLET | Refills: 0 | Status: SHIPPED | OUTPATIENT
Start: 2023-07-05

## 2023-07-05 NOTE — TELEPHONE ENCOUNTER
7/5 called and scheduled pt for preventative 8/24. Pt did not expect Dr Sullivan to become her PCP, she just wanted to have her medications refilled. She does not want to continue care, and would like to know what she has to do to get her medications, especially her psych meds which she has been taking for years. She will not schedule any more appts until she hears back if she can get enough meds until her physical first.

## 2023-07-06 ENCOUNTER — PATIENT OUTREACH (OUTPATIENT)
Dept: CARE COORDINATION | Facility: CLINIC | Age: 33
End: 2023-07-06
Payer: COMMERCIAL

## 2023-07-06 NOTE — PROGRESS NOTES
Contact   Chart Review     Situation: Patient chart reviewed by .    Background: Referral for care coordination was placed on 7-5.     Assessment: Will work with clinic manager and PCP on timing of outreach.     Plan/Recommendations: will do outreach as appropriate with direction from clinic manager and PCP.     Rima Sue,   [facility]  [phone]

## 2023-07-13 NOTE — ED NOTES
Patient left without discharge instructions and perscription.  
Pina Tirado  Internal Medicine  [address] [place] [address]  Phone: [phone]  Fax: ()-  Follow Up Time:

## 2023-07-14 ENCOUNTER — DOCUMENTATION ONLY (OUTPATIENT)
Dept: FAMILY MEDICINE | Facility: CLINIC | Age: 33
End: 2023-07-14
Payer: COMMERCIAL

## 2023-07-14 DIAGNOSIS — R51.9 NONINTRACTABLE HEADACHE, UNSPECIFIED CHRONICITY PATTERN, UNSPECIFIED HEADACHE TYPE: ICD-10-CM

## 2023-07-14 DIAGNOSIS — F90.2 ATTENTION DEFICIT HYPERACTIVITY DISORDER (ADHD), COMBINED TYPE: ICD-10-CM

## 2023-07-14 DIAGNOSIS — G43.001 MIGRAINE WITHOUT AURA AND WITH STATUS MIGRAINOSUS, NOT INTRACTABLE: ICD-10-CM

## 2023-07-14 DIAGNOSIS — F51.01 PRIMARY INSOMNIA: ICD-10-CM

## 2023-07-14 NOTE — TELEPHONE ENCOUNTER
Please call pt as she was calling about her appointment being cancelled and has questions about a med refill.    Please call her back at [phone]

## 2023-07-14 NOTE — CONFIDENTIAL NOTE
Documentation only encounter for administrative discharge of patient from care at [facility] location.  Patient will need to establish care at another facility.    A copy of the letter notifying patient that care is terminated effective immediately is outgoing with USPS via Certified and First Class mail.    Maggie Garcia M.A., LPN  Clinic Manager  [facility]

## 2023-07-17 NOTE — TELEPHONE ENCOUNTER
Patient was contacted and advised that medication refills will be provided to last through August 14, 2023.  Patient was advised that she will be receiving the letter in the mail regarding dismissal from care at [facility].    Patient ended the call.    Maggie Garcia M.A., LPN  Clinic Manager  [facility]

## 2023-07-18 DIAGNOSIS — F51.01 PRIMARY INSOMNIA: ICD-10-CM

## 2023-07-18 RX ORDER — QUETIAPINE FUMARATE 25 MG/1
TABLET, FILM COATED ORAL
Qty: 90 TABLET | Refills: 0 | OUTPATIENT
Start: 2023-07-18

## 2023-07-20 NOTE — TELEPHONE ENCOUNTER
General Call    Contacts       Type Contact Phone/Fax    07/14/2023 03:59 PM CDT Phone (Incoming) Indira Yas Tara DESTIN (Self) [phone] (M)    07/20/2023 04:09 PM CDT Phone (Incoming) Tara Christianson (Self) [phone] (M)        Reason for Call:  Refills through August 14, 2023    What are your questions or concerns:  Patient called because she needs the following medications refilled, and was told by her pharmacy that our clinic will no longer fill her medications, however patient tells writer that she was told by the manager/lead that her prescriptions would be filled by our clinic with refills through August 14, 2023.    Can the below medications be pended and sent to a provider for approval?    Topomax  Seroquel  Adderall XR 20 MG  Adderall 20 MG  Ibuprofen 600mg      Date of last appointment with provider: n/a    prefer to receive a phone call?:   Patient would prefer a phone call     Okay to leave a detailed message?: Yes at Cell number on file:    Telephone Information:   Mobile [phone]

## 2023-07-21 RX ORDER — TOPIRAMATE 25 MG/1
25 TABLET, FILM COATED ORAL DAILY
Qty: 30 TABLET | Refills: 0 | Status: SHIPPED | OUTPATIENT
Start: 2023-07-21

## 2023-07-21 RX ORDER — DEXTROAMPHETAMINE SACCHARATE, AMPHETAMINE ASPARTATE, DEXTROAMPHETAMINE SULFATE AND AMPHETAMINE SULFATE 5; 5; 5; 5 MG/1; MG/1; MG/1; MG/1
20 TABLET ORAL DAILY
Qty: 30 TABLET | Refills: 0 | Status: SHIPPED | OUTPATIENT
Start: 2023-07-21

## 2023-07-21 RX ORDER — IBUPROFEN 600 MG/1
600 TABLET, FILM COATED ORAL EVERY 8 HOURS PRN
Qty: 60 TABLET | Refills: 0 | Status: SHIPPED | OUTPATIENT
Start: 2023-07-21

## 2023-07-21 RX ORDER — DEXTROAMPHETAMINE SACCHARATE, AMPHETAMINE ASPARTATE MONOHYDRATE, DEXTROAMPHETAMINE SULFATE AND AMPHETAMINE SULFATE 5; 5; 5; 5 MG/1; MG/1; MG/1; MG/1
40 CAPSULE, EXTENDED RELEASE ORAL DAILY
Qty: 60 CAPSULE | Refills: 0 | Status: SHIPPED | OUTPATIENT
Start: 2023-07-21

## 2023-07-21 RX ORDER — QUETIAPINE FUMARATE 25 MG/1
TABLET, FILM COATED ORAL
Qty: 30 TABLET | Refills: 0 | Status: SHIPPED | OUTPATIENT
Start: 2023-07-21 | End: 2023-09-07

## 2023-07-24 DIAGNOSIS — G43.001 MIGRAINE WITHOUT AURA AND WITH STATUS MIGRAINOSUS, NOT INTRACTABLE: ICD-10-CM

## 2023-07-24 RX ORDER — TOPIRAMATE 25 MG/1
TABLET, FILM COATED ORAL
Qty: 90 TABLET | OUTPATIENT
Start: 2023-07-24

## 2023-08-02 DIAGNOSIS — F51.01 PRIMARY INSOMNIA: ICD-10-CM

## 2023-08-02 RX ORDER — QUETIAPINE FUMARATE 25 MG/1
TABLET, FILM COATED ORAL
Qty: 30 TABLET | Refills: 0 | OUTPATIENT
Start: 2023-08-02

## 2023-08-06 DIAGNOSIS — F31.9 BIPOLAR AFFECTIVE DISORDER, REMISSION STATUS UNSPECIFIED (H): ICD-10-CM

## 2023-08-06 DIAGNOSIS — G43.001 MIGRAINE WITHOUT AURA AND WITH STATUS MIGRAINOSUS, NOT INTRACTABLE: ICD-10-CM

## 2023-08-06 RX ORDER — LAMOTRIGINE 200 MG/1
200 TABLET ORAL DAILY
Qty: 30 TABLET | Refills: 0 | OUTPATIENT
Start: 2023-08-06

## 2023-08-10 ENCOUNTER — LAB REQUISITION (OUTPATIENT)
Dept: LAB | Facility: CLINIC | Age: 33
End: 2023-08-10
Payer: COMMERCIAL

## 2023-08-10 DIAGNOSIS — F31.77 BIPOLAR DISORDER, IN PARTIAL REMISSION, MOST RECENT EPISODE MIXED (H): ICD-10-CM

## 2023-08-10 DIAGNOSIS — Z13.29 ENCOUNTER FOR SCREENING FOR OTHER SUSPECTED ENDOCRINE DISORDER: ICD-10-CM

## 2023-08-10 LAB
ALBUMIN SERPL BCG-MCNC: 4.9 G/DL (ref 3.5–5.2)
ALP SERPL-CCNC: 60 U/L (ref 35–104)
ALT SERPL W P-5'-P-CCNC: 15 U/L (ref 0–50)
ANION GAP SERPL CALCULATED.3IONS-SCNC: 13 MMOL/L (ref 7–15)
AST SERPL W P-5'-P-CCNC: 26 U/L (ref 0–45)
BILIRUB SERPL-MCNC: 0.2 MG/DL
BUN SERPL-MCNC: 19.8 MG/DL (ref 6–20)
CALCIUM SERPL-MCNC: 9.2 MG/DL (ref 8.6–10)
CHLORIDE SERPL-SCNC: 103 MMOL/L (ref 98–107)
CREAT SERPL-MCNC: 0.7 MG/DL (ref 0.51–0.95)
DEPRECATED HCO3 PLAS-SCNC: 24 MMOL/L (ref 22–29)
GFR SERPL CREATININE-BSD FRML MDRD: >90 ML/MIN/1.73M2
GLUCOSE SERPL-MCNC: 86 MG/DL (ref 70–99)
POTASSIUM SERPL-SCNC: 3.8 MMOL/L (ref 3.4–5.3)
PROT SERPL-MCNC: 6.9 G/DL (ref 6.4–8.3)
SODIUM SERPL-SCNC: 140 MMOL/L (ref 136–145)
TSH SERPL DL<=0.005 MIU/L-ACNC: 0.59 UIU/ML (ref 0.3–4.2)

## 2023-08-10 PROCEDURE — 80053 COMPREHEN METABOLIC PANEL: CPT | Mod: ORL | Performed by: FAMILY MEDICINE

## 2023-08-10 PROCEDURE — 80175 DRUG SCREEN QUAN LAMOTRIGINE: CPT | Mod: ORL | Performed by: FAMILY MEDICINE

## 2023-08-10 PROCEDURE — 84443 ASSAY THYROID STIM HORMONE: CPT | Mod: ORL | Performed by: FAMILY MEDICINE

## 2023-08-12 LAB — LAMOTRIGINE SERPL-MCNC: 7.9 UG/ML

## 2023-08-22 DIAGNOSIS — G43.001 MIGRAINE WITHOUT AURA AND WITH STATUS MIGRAINOSUS, NOT INTRACTABLE: ICD-10-CM

## 2023-08-22 RX ORDER — TOPIRAMATE 25 MG/1
TABLET, FILM COATED ORAL
Qty: 30 TABLET | Refills: 0 | OUTPATIENT
Start: 2023-08-22

## 2023-08-25 DIAGNOSIS — R51.9 NONINTRACTABLE HEADACHE, UNSPECIFIED CHRONICITY PATTERN, UNSPECIFIED HEADACHE TYPE: ICD-10-CM

## 2023-08-25 RX ORDER — IBUPROFEN 600 MG/1
TABLET, FILM COATED ORAL
Qty: 60 TABLET | Refills: 0 | OUTPATIENT
Start: 2023-08-25

## 2023-09-07 ENCOUNTER — HOSPITAL ENCOUNTER (INPATIENT)
Facility: HOSPITAL | Age: 33
LOS: 2 days | Discharge: HOME | End: 2023-09-09
Attending: EMERGENCY MEDICINE | Admitting: INTERNAL MEDICINE
Payer: COMMERCIAL

## 2023-09-07 DIAGNOSIS — N20.1 URETEROLITHIASIS: ICD-10-CM

## 2023-09-07 LAB
ALBUMIN UR-MCNC: NEGATIVE MG/DL
ANION GAP SERPL CALCULATED.3IONS-SCNC: 5 MMOL/L (ref 7–15)
APPEARANCE UR: CLEAR
BASOPHILS # BLD AUTO: 0 10E3/UL (ref 0–0.2)
BASOPHILS NFR BLD AUTO: 0 %
BILIRUB UR QL STRIP: NEGATIVE
BUN SERPL-MCNC: 16.2 MG/DL (ref 6–20)
CALCIUM SERPL-MCNC: 8.4 MG/DL (ref 8.6–10)
CHLORIDE SERPL-SCNC: 110 MMOL/L (ref 98–107)
COLOR UR AUTO: ABNORMAL
CREAT SERPL-MCNC: 0.68 MG/DL (ref 0.51–0.95)
CRP SERPL-MCNC: <3 MG/L
DEPRECATED HCO3 PLAS-SCNC: 24 MMOL/L (ref 22–29)
EGFRCR SERPLBLD CKD-EPI 2021: >90 ML/MIN/1.73M2
EOSINOPHIL # BLD AUTO: 0.1 10E3/UL (ref 0–0.7)
EOSINOPHIL NFR BLD AUTO: 2 %
ERYTHROCYTE [DISTWIDTH] IN BLOOD BY AUTOMATED COUNT: 12.4 % (ref 10–15)
GLUCOSE SERPL-MCNC: 85 MG/DL (ref 70–99)
GLUCOSE UR STRIP-MCNC: NEGATIVE MG/DL
HCT VFR BLD AUTO: 38.4 % (ref 35–47)
HGB BLD-MCNC: 12.7 G/DL (ref 11.7–15.7)
HGB UR QL STRIP: ABNORMAL
IMM GRANULOCYTES # BLD: 0 10E3/UL
IMM GRANULOCYTES NFR BLD: 0 %
KETONES UR STRIP-MCNC: NEGATIVE MG/DL
LEUKOCYTE ESTERASE UR QL STRIP: NEGATIVE
LYMPHOCYTES # BLD AUTO: 2.8 10E3/UL (ref 0.8–5.3)
LYMPHOCYTES NFR BLD AUTO: 49 %
MCH RBC QN AUTO: 31.5 PG (ref 26.5–33)
MCHC RBC AUTO-ENTMCNC: 33.1 G/DL (ref 31.5–36.5)
MCV RBC AUTO: 95 FL (ref 78–100)
MONOCYTES # BLD AUTO: 0.4 10E3/UL (ref 0–1.3)
MONOCYTES NFR BLD AUTO: 7 %
MUCOUS THREADS #/AREA URNS LPF: PRESENT /LPF
NEUTROPHILS # BLD AUTO: 2.4 10E3/UL (ref 1.6–8.3)
NEUTROPHILS NFR BLD AUTO: 42 %
NITRATE UR QL: NEGATIVE
NRBC # BLD AUTO: 0 10E3/UL
NRBC BLD AUTO-RTO: 0 /100
PH UR STRIP: 6.5 [PH] (ref 5–7)
PLATELET # BLD AUTO: 272 10E3/UL (ref 150–450)
POTASSIUM SERPL-SCNC: 4.1 MMOL/L (ref 3.4–5.3)
RBC # BLD AUTO: 4.03 10E6/UL (ref 3.8–5.2)
RBC URINE: 9 /HPF
SODIUM SERPL-SCNC: 139 MMOL/L (ref 136–145)
SP GR UR STRIP: 1.02 (ref 1–1.03)
SQUAMOUS EPITHELIAL: 1 /HPF
UROBILINOGEN UR STRIP-MCNC: <2 MG/DL
WBC # BLD AUTO: 5.6 10E3/UL (ref 4–11)
WBC URINE: 1 /HPF

## 2023-09-07 PROCEDURE — 96376 TX/PRO/DX INJ SAME DRUG ADON: CPT

## 2023-09-07 PROCEDURE — 80048 BASIC METABOLIC PNL TOTAL CA: CPT | Performed by: EMERGENCY MEDICINE

## 2023-09-07 PROCEDURE — 96374 THER/PROPH/DIAG INJ IV PUSH: CPT

## 2023-09-07 PROCEDURE — 258N000003 HC RX IP 258 OP 636: Performed by: EMERGENCY MEDICINE

## 2023-09-07 PROCEDURE — 81001 URINALYSIS AUTO W/SCOPE: CPT | Performed by: EMERGENCY MEDICINE

## 2023-09-07 PROCEDURE — 250N000011 HC RX IP 250 OP 636: Performed by: INTERNAL MEDICINE

## 2023-09-07 PROCEDURE — 99223 1ST HOSP IP/OBS HIGH 75: CPT | Mod: AI | Performed by: INTERNAL MEDICINE

## 2023-09-07 PROCEDURE — 96375 TX/PRO/DX INJ NEW DRUG ADDON: CPT

## 2023-09-07 PROCEDURE — 250N000013 HC RX MED GY IP 250 OP 250 PS 637: Performed by: EMERGENCY MEDICINE

## 2023-09-07 PROCEDURE — 250N000011 HC RX IP 250 OP 636: Performed by: EMERGENCY MEDICINE

## 2023-09-07 PROCEDURE — 96361 HYDRATE IV INFUSION ADD-ON: CPT

## 2023-09-07 PROCEDURE — 250N000013 HC RX MED GY IP 250 OP 250 PS 637: Performed by: STUDENT IN AN ORGANIZED HEALTH CARE EDUCATION/TRAINING PROGRAM

## 2023-09-07 PROCEDURE — 120N000001 HC R&B MED SURG/OB

## 2023-09-07 PROCEDURE — 86140 C-REACTIVE PROTEIN: CPT | Performed by: EMERGENCY MEDICINE

## 2023-09-07 PROCEDURE — 99253 IP/OBS CNSLTJ NEW/EST LOW 45: CPT | Performed by: STUDENT IN AN ORGANIZED HEALTH CARE EDUCATION/TRAINING PROGRAM

## 2023-09-07 PROCEDURE — 99285 EMERGENCY DEPT VISIT HI MDM: CPT | Mod: 25

## 2023-09-07 PROCEDURE — 258N000003 HC RX IP 258 OP 636: Performed by: INTERNAL MEDICINE

## 2023-09-07 PROCEDURE — 85025 COMPLETE CBC W/AUTO DIFF WBC: CPT | Performed by: EMERGENCY MEDICINE

## 2023-09-07 PROCEDURE — 36415 COLL VENOUS BLD VENIPUNCTURE: CPT | Performed by: EMERGENCY MEDICINE

## 2023-09-07 RX ORDER — POLYETHYLENE GLYCOL 3350 17 G/17G
17 POWDER, FOR SOLUTION ORAL DAILY PRN
Status: DISCONTINUED | OUTPATIENT
Start: 2023-09-07 | End: 2023-09-09 | Stop reason: HOSPADM

## 2023-09-07 RX ORDER — HYDROMORPHONE HYDROCHLORIDE 1 MG/ML
.5-1 INJECTION, SOLUTION INTRAMUSCULAR; INTRAVENOUS; SUBCUTANEOUS
Status: DISCONTINUED | OUTPATIENT
Start: 2023-09-07 | End: 2023-09-09

## 2023-09-07 RX ORDER — SODIUM CHLORIDE 9 MG/ML
INJECTION, SOLUTION INTRAVENOUS CONTINUOUS
Status: DISCONTINUED | OUTPATIENT
Start: 2023-09-07 | End: 2023-09-09 | Stop reason: HOSPADM

## 2023-09-07 RX ORDER — ONDANSETRON 4 MG/1
4 TABLET, ORALLY DISINTEGRATING ORAL EVERY 6 HOURS PRN
Status: DISCONTINUED | OUTPATIENT
Start: 2023-09-07 | End: 2023-09-09 | Stop reason: HOSPADM

## 2023-09-07 RX ORDER — AMOXICILLIN 250 MG
1 CAPSULE ORAL 2 TIMES DAILY
Status: DISCONTINUED | OUTPATIENT
Start: 2023-09-07 | End: 2023-09-09 | Stop reason: HOSPADM

## 2023-09-07 RX ORDER — FENTANYL CITRATE 50 UG/ML
25 INJECTION, SOLUTION INTRAMUSCULAR; INTRAVENOUS ONCE
Status: COMPLETED | OUTPATIENT
Start: 2023-09-07 | End: 2023-09-07

## 2023-09-07 RX ORDER — ALPRAZOLAM 0.5 MG
0.5 TABLET ORAL 3 TIMES DAILY PRN
Status: DISCONTINUED | OUTPATIENT
Start: 2023-09-07 | End: 2023-09-09 | Stop reason: HOSPADM

## 2023-09-07 RX ORDER — TAMSULOSIN HYDROCHLORIDE 0.4 MG/1
0.4 CAPSULE ORAL DAILY
Status: DISCONTINUED | OUTPATIENT
Start: 2023-09-07 | End: 2023-09-09 | Stop reason: HOSPADM

## 2023-09-07 RX ORDER — ONDANSETRON 2 MG/ML
4 INJECTION INTRAMUSCULAR; INTRAVENOUS EVERY 6 HOURS PRN
Status: DISCONTINUED | OUTPATIENT
Start: 2023-09-07 | End: 2023-09-09 | Stop reason: HOSPADM

## 2023-09-07 RX ORDER — KETOROLAC TROMETHAMINE 15 MG/ML
15 INJECTION, SOLUTION INTRAMUSCULAR; INTRAVENOUS ONCE
Status: COMPLETED | OUTPATIENT
Start: 2023-09-07 | End: 2023-09-07

## 2023-09-07 RX ORDER — ONDANSETRON 2 MG/ML
4 INJECTION INTRAMUSCULAR; INTRAVENOUS ONCE
Status: COMPLETED | OUTPATIENT
Start: 2023-09-07 | End: 2023-09-07

## 2023-09-07 RX ORDER — LIDOCAINE 40 MG/G
CREAM TOPICAL
Status: DISCONTINUED | OUTPATIENT
Start: 2023-09-07 | End: 2023-09-09 | Stop reason: HOSPADM

## 2023-09-07 RX ORDER — FENTANYL CITRATE 50 UG/ML
100 INJECTION, SOLUTION INTRAMUSCULAR; INTRAVENOUS ONCE
Status: COMPLETED | OUTPATIENT
Start: 2023-09-07 | End: 2023-09-07

## 2023-09-07 RX ORDER — KETOROLAC TROMETHAMINE 15 MG/ML
15 INJECTION, SOLUTION INTRAMUSCULAR; INTRAVENOUS EVERY 6 HOURS PRN
Status: DISCONTINUED | OUTPATIENT
Start: 2023-09-07 | End: 2023-09-09 | Stop reason: HOSPADM

## 2023-09-07 RX ORDER — TERBINAFINE HYDROCHLORIDE 250 MG/1
250 TABLET ORAL DAILY
COMMUNITY

## 2023-09-07 RX ORDER — ACETAMINOPHEN 325 MG/1
975 TABLET ORAL ONCE
Status: COMPLETED | OUTPATIENT
Start: 2023-09-07 | End: 2023-09-07

## 2023-09-07 RX ORDER — ALPRAZOLAM 0.5 MG
0.5 TABLET ORAL 3 TIMES DAILY PRN
COMMUNITY

## 2023-09-07 RX ORDER — TOPIRAMATE 25 MG/1
25 TABLET, FILM COATED ORAL DAILY
Status: DISCONTINUED | OUTPATIENT
Start: 2023-09-08 | End: 2023-09-09 | Stop reason: HOSPADM

## 2023-09-07 RX ORDER — RIZATRIPTAN BENZOATE 10 MG/1
10 TABLET ORAL
Status: DISCONTINUED | OUTPATIENT
Start: 2023-09-07 | End: 2023-09-09 | Stop reason: HOSPADM

## 2023-09-07 RX ORDER — DEXTROAMPHETAMINE SACCHARATE, AMPHETAMINE ASPARTATE, DEXTROAMPHETAMINE SULFATE AND AMPHETAMINE SULFATE 2.5; 2.5; 2.5; 2.5 MG/1; MG/1; MG/1; MG/1
20 TABLET ORAL DAILY PRN
Status: DISCONTINUED | OUTPATIENT
Start: 2023-09-07 | End: 2023-09-09 | Stop reason: HOSPADM

## 2023-09-07 RX ORDER — LAMOTRIGINE 200 MG/1
200 TABLET ORAL DAILY
Status: DISCONTINUED | OUTPATIENT
Start: 2023-09-08 | End: 2023-09-09 | Stop reason: HOSPADM

## 2023-09-07 RX ADMIN — FENTANYL CITRATE 25 MCG: 50 INJECTION, SOLUTION INTRAMUSCULAR; INTRAVENOUS at 16:52

## 2023-09-07 RX ADMIN — SENNOSIDES AND DOCUSATE SODIUM 1 TABLET: 50; 8.6 TABLET ORAL at 21:49

## 2023-09-07 RX ADMIN — KETOROLAC TROMETHAMINE 15 MG: 15 INJECTION INTRAMUSCULAR; INTRAVENOUS at 18:25

## 2023-09-07 RX ADMIN — HYDROMORPHONE HYDROCHLORIDE 0.5 MG: 1 INJECTION, SOLUTION INTRAMUSCULAR; INTRAVENOUS; SUBCUTANEOUS at 21:24

## 2023-09-07 RX ADMIN — ACETAMINOPHEN 975 MG: 325 TABLET ORAL at 18:23

## 2023-09-07 RX ADMIN — SODIUM CHLORIDE 1000 ML: 9 INJECTION, SOLUTION INTRAVENOUS at 16:03

## 2023-09-07 RX ADMIN — SODIUM CHLORIDE: 9 INJECTION, SOLUTION INTRAVENOUS at 20:04

## 2023-09-07 RX ADMIN — TAMSULOSIN HYDROCHLORIDE 0.4 MG: 0.4 CAPSULE ORAL at 18:25

## 2023-09-07 RX ADMIN — ONDANSETRON 4 MG: 2 INJECTION INTRAMUSCULAR; INTRAVENOUS at 14:42

## 2023-09-07 RX ADMIN — HYDROMORPHONE HYDROCHLORIDE 1 MG: 1 INJECTION, SOLUTION INTRAMUSCULAR; INTRAVENOUS; SUBCUTANEOUS at 14:45

## 2023-09-07 RX ADMIN — POLYETHYLENE GLYCOL 3350 17 G: 17 POWDER, FOR SOLUTION ORAL at 21:49

## 2023-09-07 ASSESSMENT — ACTIVITIES OF DAILY LIVING (ADL)
ADLS_ACUITY_SCORE: 35
DEPENDENT_IADLS:: INDEPENDENT
ADLS_ACUITY_SCORE: 35
ADLS_ACUITY_SCORE: 35

## 2023-09-07 NOTE — ED NOTES
Expected Patient Referral to ED  1:06 PM    Referring Clinic/Provider:  [facility]    Reason for referral/Clinical facts:  Flank pain 2 mm stone on CT scan and pain intractable after Dilaudid and Toradol.  Being sent for ongoing pain control possible urology consultation.    Recommendations provided:  Send to ED for further evaluation    Caller was informed that this institution does possess the capabilities and/or resources to provide for patient and should be transferred to our facility.    Discussed that if direct admit is sought and any hurdles are encountered, this ED would be happy to see the patient and evaluate.    Informed caller that recommendations provided are recommendations based only on the facts provided and that they responsible to accept or reject the advice, or to seek a formal in person consultation as needed and that this ED will see/treat patient should they arrive.      Jose Valera MD  [facility] EMERGENCY DEPARTMENT  [address] [place] [id number]  [phone]       Jose Valera MD  09/07/23 1940
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
## 2023-09-07 NOTE — ED TRIAGE NOTES
Pt brought in by EMS from [place] urgency room.  Pt has known kidney stones.  Pt doubled over, rocking back and forth moaning with right flank pain 10/10 that radiates to back and groin.  Pt received Toradol and Dilaudid while at .     Triage Assessment       Row Name 09/07/23 1413       Triage Assessment (Adult)    Airway WDL WDL       Respiratory WDL    Respiratory WDL WDL

## 2023-09-07 NOTE — ED NOTES
Patient only had small amount of urine via straight cath. Spoke to Dr. Rosado and patient will have bladder scan.

## 2023-09-08 ENCOUNTER — TELEPHONE (OUTPATIENT)
Dept: UROLOGY | Facility: CLINIC | Age: 33
End: 2023-09-08
Payer: COMMERCIAL

## 2023-09-08 ENCOUNTER — APPOINTMENT (OUTPATIENT)
Dept: RADIOLOGY | Facility: HOSPITAL | Age: 33
End: 2023-09-08
Attending: UROLOGY
Payer: COMMERCIAL

## 2023-09-08 ENCOUNTER — ANESTHESIA EVENT (OUTPATIENT)
Dept: SURGERY | Facility: HOSPITAL | Age: 33
End: 2023-09-08
Payer: COMMERCIAL

## 2023-09-08 ENCOUNTER — HOSPITAL ENCOUNTER (OUTPATIENT)
Facility: HOSPITAL | Age: 33
End: 2023-09-08
Attending: UROLOGY | Admitting: UROLOGY
Payer: COMMERCIAL

## 2023-09-08 ENCOUNTER — ANESTHESIA (OUTPATIENT)
Dept: SURGERY | Facility: HOSPITAL | Age: 33
End: 2023-09-08
Payer: COMMERCIAL

## 2023-09-08 DIAGNOSIS — N20.0 KIDNEY STONE: Primary | ICD-10-CM

## 2023-09-08 LAB
ANION GAP SERPL CALCULATED.3IONS-SCNC: 8 MMOL/L (ref 7–15)
BUN SERPL-MCNC: 9.2 MG/DL (ref 6–20)
CALCIUM SERPL-MCNC: 7.7 MG/DL (ref 8.6–10)
CHLORIDE SERPL-SCNC: 109 MMOL/L (ref 98–107)
CREAT SERPL-MCNC: 0.57 MG/DL (ref 0.51–0.95)
DEPRECATED HCO3 PLAS-SCNC: 20 MMOL/L (ref 22–29)
EGFRCR SERPLBLD CKD-EPI 2021: >90 ML/MIN/1.73M2
ERYTHROCYTE [DISTWIDTH] IN BLOOD BY AUTOMATED COUNT: 12.4 % (ref 10–15)
GLUCOSE SERPL-MCNC: 85 MG/DL (ref 70–99)
HCT VFR BLD AUTO: 33.3 % (ref 35–47)
HGB BLD-MCNC: 11 G/DL (ref 11.7–15.7)
MCH RBC QN AUTO: 31.9 PG (ref 26.5–33)
MCHC RBC AUTO-ENTMCNC: 33 G/DL (ref 31.5–36.5)
MCV RBC AUTO: 97 FL (ref 78–100)
PLATELET # BLD AUTO: 227 10E3/UL (ref 150–450)
POTASSIUM SERPL-SCNC: 3.8 MMOL/L (ref 3.4–5.3)
RBC # BLD AUTO: 3.45 10E6/UL (ref 3.8–5.2)
SODIUM SERPL-SCNC: 137 MMOL/L (ref 136–145)
WBC # BLD AUTO: 4.5 10E3/UL (ref 4–11)

## 2023-09-08 PROCEDURE — BT1F1ZZ FLUOROSCOPY OF LEFT KIDNEY, URETER AND BLADDER USING LOW OSMOLAR CONTRAST: ICD-10-PCS | Performed by: UROLOGY

## 2023-09-08 PROCEDURE — 120N000001 HC R&B MED SURG/OB

## 2023-09-08 PROCEDURE — 52332 CYSTOSCOPY AND TREATMENT: CPT | Mod: RT | Performed by: UROLOGY

## 2023-09-08 PROCEDURE — C2617 STENT, NON-COR, TEM W/O DEL: HCPCS | Performed by: UROLOGY

## 2023-09-08 PROCEDURE — 250N000025 HC SEVOFLURANE, PER MIN: Performed by: UROLOGY

## 2023-09-08 PROCEDURE — 250N000009 HC RX 250: Performed by: NURSE ANESTHETIST, CERTIFIED REGISTERED

## 2023-09-08 PROCEDURE — 255N000002 HC RX 255 OP 636: Performed by: UROLOGY

## 2023-09-08 PROCEDURE — 360N000083 HC SURGERY LEVEL 3 W/ FLUORO, PER MIN: Performed by: UROLOGY

## 2023-09-08 PROCEDURE — 82365 CALCULUS SPECTROSCOPY: CPT | Performed by: UROLOGY

## 2023-09-08 PROCEDURE — 272N000001 HC OR GENERAL SUPPLY STERILE: Performed by: UROLOGY

## 2023-09-08 PROCEDURE — 250N000013 HC RX MED GY IP 250 OP 250 PS 637: Performed by: UROLOGY

## 2023-09-08 PROCEDURE — 250N000011 HC RX IP 250 OP 636: Performed by: ANESTHESIOLOGY

## 2023-09-08 PROCEDURE — 250N000011 HC RX IP 250 OP 636: Performed by: UROLOGY

## 2023-09-08 PROCEDURE — 0TC68ZZ EXTIRPATION OF MATTER FROM RIGHT URETER, VIA NATURAL OR ARTIFICIAL OPENING ENDOSCOPIC: ICD-10-PCS | Performed by: UROLOGY

## 2023-09-08 PROCEDURE — 250N000011 HC RX IP 250 OP 636: Mod: JZ | Performed by: NURSE ANESTHETIST, CERTIFIED REGISTERED

## 2023-09-08 PROCEDURE — 80048 BASIC METABOLIC PNL TOTAL CA: CPT | Performed by: INTERNAL MEDICINE

## 2023-09-08 PROCEDURE — 74420 UROGRAPHY RTRGR +-KUB: CPT | Mod: 26 | Performed by: UROLOGY

## 2023-09-08 PROCEDURE — 258N000003 HC RX IP 258 OP 636: Performed by: ANESTHESIOLOGY

## 2023-09-08 PROCEDURE — 0TH98YZ INSERTION OF OTHER DEVICE INTO URETER, VIA NATURAL OR ARTIFICIAL OPENING ENDOSCOPIC: ICD-10-PCS | Performed by: UROLOGY

## 2023-09-08 PROCEDURE — 258N000003 HC RX IP 258 OP 636: Performed by: INTERNAL MEDICINE

## 2023-09-08 PROCEDURE — 250N000013 HC RX MED GY IP 250 OP 250 PS 637: Performed by: STUDENT IN AN ORGANIZED HEALTH CARE EDUCATION/TRAINING PROGRAM

## 2023-09-08 PROCEDURE — 258N000003 HC RX IP 258 OP 636: Performed by: UROLOGY

## 2023-09-08 PROCEDURE — C1769 GUIDE WIRE: HCPCS | Performed by: UROLOGY

## 2023-09-08 PROCEDURE — 999N000180 XR SURGERY CARM FLUORO LESS THAN 5 MIN

## 2023-09-08 PROCEDURE — 52352 CYSTOURETERO W/STONE REMOVE: CPT | Mod: RT | Performed by: UROLOGY

## 2023-09-08 PROCEDURE — G0378 HOSPITAL OBSERVATION PER HR: HCPCS

## 2023-09-08 PROCEDURE — 250N000013 HC RX MED GY IP 250 OP 250 PS 637: Performed by: EMERGENCY MEDICINE

## 2023-09-08 PROCEDURE — 250N000009 HC RX 250: Performed by: STUDENT IN AN ORGANIZED HEALTH CARE EDUCATION/TRAINING PROGRAM

## 2023-09-08 PROCEDURE — 370N000017 HC ANESTHESIA TECHNICAL FEE, PER MIN: Performed by: UROLOGY

## 2023-09-08 PROCEDURE — 250N000011 HC RX IP 250 OP 636: Mod: JZ | Performed by: INTERNAL MEDICINE

## 2023-09-08 PROCEDURE — 258N000003 HC RX IP 258 OP 636: Performed by: STUDENT IN AN ORGANIZED HEALTH CARE EDUCATION/TRAINING PROGRAM

## 2023-09-08 PROCEDURE — 99232 SBSQ HOSP IP/OBS MODERATE 35: CPT | Performed by: INTERNAL MEDICINE

## 2023-09-08 PROCEDURE — 0TC78ZZ EXTIRPATION OF MATTER FROM LEFT URETER, VIA NATURAL OR ARTIFICIAL OPENING ENDOSCOPIC: ICD-10-PCS | Performed by: UROLOGY

## 2023-09-08 PROCEDURE — C1894 INTRO/SHEATH, NON-LASER: HCPCS | Performed by: UROLOGY

## 2023-09-08 PROCEDURE — 258N000003 HC RX IP 258 OP 636: Performed by: NURSE ANESTHETIST, CERTIFIED REGISTERED

## 2023-09-08 PROCEDURE — 999N000141 HC STATISTIC PRE-PROCEDURE NURSING ASSESSMENT: Performed by: UROLOGY

## 2023-09-08 PROCEDURE — C1887 CATHETER, GUIDING: HCPCS | Performed by: UROLOGY

## 2023-09-08 PROCEDURE — BT1D1ZZ FLUOROSCOPY OF RIGHT KIDNEY, URETER AND BLADDER USING LOW OSMOLAR CONTRAST: ICD-10-PCS | Performed by: UROLOGY

## 2023-09-08 PROCEDURE — 250N000011 HC RX IP 250 OP 636: Mod: JZ | Performed by: ANESTHESIOLOGY

## 2023-09-08 PROCEDURE — 36415 COLL VENOUS BLD VENIPUNCTURE: CPT | Performed by: INTERNAL MEDICINE

## 2023-09-08 PROCEDURE — 710N000009 HC RECOVERY PHASE 1, LEVEL 1, PER MIN: Performed by: UROLOGY

## 2023-09-08 PROCEDURE — 85027 COMPLETE CBC AUTOMATED: CPT | Performed by: INTERNAL MEDICINE

## 2023-09-08 PROCEDURE — 250N000013 HC RX MED GY IP 250 OP 250 PS 637: Performed by: INTERNAL MEDICINE

## 2023-09-08 DEVICE — URETERAL STENT
Type: IMPLANTABLE DEVICE | Site: URETER | Status: FUNCTIONAL
Brand: PERCUFLEX™ PLUS

## 2023-09-08 RX ORDER — NALOXONE HYDROCHLORIDE 0.4 MG/ML
0.2 INJECTION, SOLUTION INTRAMUSCULAR; INTRAVENOUS; SUBCUTANEOUS
Status: DISCONTINUED | OUTPATIENT
Start: 2023-09-08 | End: 2023-09-09 | Stop reason: HOSPADM

## 2023-09-08 RX ORDER — ONDANSETRON 4 MG/1
4 TABLET, ORALLY DISINTEGRATING ORAL EVERY 30 MIN PRN
Status: DISCONTINUED | OUTPATIENT
Start: 2023-09-08 | End: 2023-09-08 | Stop reason: HOSPADM

## 2023-09-08 RX ORDER — KETOROLAC TROMETHAMINE 30 MG/ML
15 INJECTION, SOLUTION INTRAMUSCULAR; INTRAVENOUS ONCE
Status: COMPLETED | OUTPATIENT
Start: 2023-09-08 | End: 2023-09-08

## 2023-09-08 RX ORDER — CEFAZOLIN SODIUM 1 G/3ML
1 INJECTION, POWDER, FOR SOLUTION INTRAMUSCULAR; INTRAVENOUS EVERY 8 HOURS
Status: DISCONTINUED | OUTPATIENT
Start: 2023-09-08 | End: 2023-09-08

## 2023-09-08 RX ORDER — SODIUM CHLORIDE, SODIUM LACTATE, POTASSIUM CHLORIDE, CALCIUM CHLORIDE 600; 310; 30; 20 MG/100ML; MG/100ML; MG/100ML; MG/100ML
INJECTION, SOLUTION INTRAVENOUS CONTINUOUS
Status: DISCONTINUED | OUTPATIENT
Start: 2023-09-08 | End: 2023-09-08 | Stop reason: HOSPADM

## 2023-09-08 RX ORDER — HYDROMORPHONE HYDROCHLORIDE 1 MG/ML
0.4 INJECTION, SOLUTION INTRAMUSCULAR; INTRAVENOUS; SUBCUTANEOUS EVERY 5 MIN PRN
Status: DISCONTINUED | OUTPATIENT
Start: 2023-09-08 | End: 2023-09-08 | Stop reason: HOSPADM

## 2023-09-08 RX ORDER — FENTANYL CITRATE 50 UG/ML
50 INJECTION, SOLUTION INTRAMUSCULAR; INTRAVENOUS ONCE
Status: DISCONTINUED | OUTPATIENT
Start: 2023-09-08 | End: 2023-09-09 | Stop reason: HOSPADM

## 2023-09-08 RX ORDER — NALOXONE HYDROCHLORIDE 0.4 MG/ML
0.4 INJECTION, SOLUTION INTRAMUSCULAR; INTRAVENOUS; SUBCUTANEOUS
Status: DISCONTINUED | OUTPATIENT
Start: 2023-09-08 | End: 2023-09-09 | Stop reason: HOSPADM

## 2023-09-08 RX ORDER — ACETAMINOPHEN 325 MG/1
975 TABLET ORAL ONCE
Status: DISCONTINUED | OUTPATIENT
Start: 2023-09-08 | End: 2023-09-09

## 2023-09-08 RX ORDER — DEXMEDETOMIDINE HYDROCHLORIDE 4 UG/ML
INJECTION, SOLUTION INTRAVENOUS
Status: DISCONTINUED
Start: 2023-09-08 | End: 2023-09-08 | Stop reason: HOSPADM

## 2023-09-08 RX ORDER — FENTANYL CITRATE 50 UG/ML
25 INJECTION, SOLUTION INTRAMUSCULAR; INTRAVENOUS EVERY 5 MIN PRN
Status: DISCONTINUED | OUTPATIENT
Start: 2023-09-08 | End: 2023-09-08 | Stop reason: HOSPADM

## 2023-09-08 RX ORDER — LIDOCAINE 40 MG/G
CREAM TOPICAL
Status: DISCONTINUED | OUTPATIENT
Start: 2023-09-08 | End: 2023-09-08 | Stop reason: HOSPADM

## 2023-09-08 RX ORDER — SODIUM CHLORIDE, SODIUM LACTATE, POTASSIUM CHLORIDE, CALCIUM CHLORIDE 600; 310; 30; 20 MG/100ML; MG/100ML; MG/100ML; MG/100ML
INJECTION, SOLUTION INTRAVENOUS CONTINUOUS PRN
Status: DISCONTINUED | OUTPATIENT
Start: 2023-09-08 | End: 2023-09-08

## 2023-09-08 RX ORDER — CEFAZOLIN SODIUM 1 G/3ML
1 INJECTION, POWDER, FOR SOLUTION INTRAMUSCULAR; INTRAVENOUS EVERY 8 HOURS
Status: DISCONTINUED | OUTPATIENT
Start: 2023-09-08 | End: 2023-09-09 | Stop reason: HOSPADM

## 2023-09-08 RX ORDER — LIDOCAINE HYDROCHLORIDE 10 MG/ML
INJECTION, SOLUTION INFILTRATION; PERINEURAL PRN
Status: DISCONTINUED | OUTPATIENT
Start: 2023-09-08 | End: 2023-09-08

## 2023-09-08 RX ORDER — HALOPERIDOL 5 MG/ML
1 INJECTION INTRAMUSCULAR
Status: DISCONTINUED | OUTPATIENT
Start: 2023-09-08 | End: 2023-09-08 | Stop reason: HOSPADM

## 2023-09-08 RX ORDER — FENTANYL CITRATE 50 UG/ML
INJECTION, SOLUTION INTRAMUSCULAR; INTRAVENOUS PRN
Status: DISCONTINUED | OUTPATIENT
Start: 2023-09-08 | End: 2023-09-08

## 2023-09-08 RX ORDER — ONDANSETRON 2 MG/ML
4 INJECTION INTRAMUSCULAR; INTRAVENOUS EVERY 30 MIN PRN
Status: DISCONTINUED | OUTPATIENT
Start: 2023-09-08 | End: 2023-09-08 | Stop reason: HOSPADM

## 2023-09-08 RX ORDER — PROPOFOL 10 MG/ML
INJECTION, EMULSION INTRAVENOUS PRN
Status: DISCONTINUED | OUTPATIENT
Start: 2023-09-08 | End: 2023-09-08

## 2023-09-08 RX ORDER — FENTANYL CITRATE 50 UG/ML
50 INJECTION, SOLUTION INTRAMUSCULAR; INTRAVENOUS EVERY 5 MIN PRN
Status: DISCONTINUED | OUTPATIENT
Start: 2023-09-08 | End: 2023-09-08 | Stop reason: HOSPADM

## 2023-09-08 RX ORDER — MEPERIDINE HYDROCHLORIDE 25 MG/ML
12.5 INJECTION INTRAMUSCULAR; INTRAVENOUS; SUBCUTANEOUS EVERY 5 MIN PRN
Status: DISCONTINUED | OUTPATIENT
Start: 2023-09-08 | End: 2023-09-08 | Stop reason: HOSPADM

## 2023-09-08 RX ORDER — ONDANSETRON 2 MG/ML
INJECTION INTRAMUSCULAR; INTRAVENOUS PRN
Status: DISCONTINUED | OUTPATIENT
Start: 2023-09-08 | End: 2023-09-08

## 2023-09-08 RX ORDER — DEXAMETHASONE SODIUM PHOSPHATE 4 MG/ML
INJECTION, SOLUTION INTRA-ARTICULAR; INTRALESIONAL; INTRAMUSCULAR; INTRAVENOUS; SOFT TISSUE PRN
Status: DISCONTINUED | OUTPATIENT
Start: 2023-09-08 | End: 2023-09-08

## 2023-09-08 RX ORDER — HYDROMORPHONE HYDROCHLORIDE 1 MG/ML
0.2 INJECTION, SOLUTION INTRAMUSCULAR; INTRAVENOUS; SUBCUTANEOUS EVERY 5 MIN PRN
Status: DISCONTINUED | OUTPATIENT
Start: 2023-09-08 | End: 2023-09-08 | Stop reason: HOSPADM

## 2023-09-08 RX ORDER — DEXTROAMPHETAMINE SACCHARATE, AMPHETAMINE ASPARTATE MONOHYDRATE, DEXTROAMPHETAMINE SULFATE AND AMPHETAMINE SULFATE 1.25; 1.25; 1.25; 1.25 MG/1; MG/1; MG/1; MG/1
10 CAPSULE, EXTENDED RELEASE ORAL DAILY
Status: DISCONTINUED | OUTPATIENT
Start: 2023-09-09 | End: 2023-09-09 | Stop reason: HOSPADM

## 2023-09-08 RX ORDER — DEXTROAMPHETAMINE SACCHARATE, AMPHETAMINE ASPARTATE MONOHYDRATE, DEXTROAMPHETAMINE SULFATE AND AMPHETAMINE SULFATE 3.75; 3.75; 3.75; 3.75 MG/1; MG/1; MG/1; MG/1
30 CAPSULE, EXTENDED RELEASE ORAL DAILY
Status: DISCONTINUED | OUTPATIENT
Start: 2023-09-09 | End: 2023-09-09 | Stop reason: HOSPADM

## 2023-09-08 RX ORDER — KETAMINE HYDROCHLORIDE 10 MG/ML
INJECTION INTRAMUSCULAR; INTRAVENOUS PRN
Status: DISCONTINUED | OUTPATIENT
Start: 2023-09-08 | End: 2023-09-08

## 2023-09-08 RX ADMIN — PHENYLEPHRINE HYDROCHLORIDE 50 MCG: 10 INJECTION INTRAVENOUS at 15:18

## 2023-09-08 RX ADMIN — HYDROMORPHONE HYDROCHLORIDE 1 MG: 1 INJECTION, SOLUTION INTRAMUSCULAR; INTRAVENOUS; SUBCUTANEOUS at 07:46

## 2023-09-08 RX ADMIN — SODIUM CHLORIDE: 9 INJECTION, SOLUTION INTRAVENOUS at 12:20

## 2023-09-08 RX ADMIN — KETOROLAC TROMETHAMINE 15 MG: 30 INJECTION, SOLUTION INTRAMUSCULAR; INTRAVENOUS at 16:33

## 2023-09-08 RX ADMIN — ONDANSETRON 4 MG: 2 INJECTION INTRAMUSCULAR; INTRAVENOUS at 15:56

## 2023-09-08 RX ADMIN — DEXMEDETOMIDINE HYDROCHLORIDE 12 MCG: 100 INJECTION, SOLUTION INTRAVENOUS at 14:04

## 2023-09-08 RX ADMIN — HYDROMORPHONE HYDROCHLORIDE 1 MG: 1 INJECTION, SOLUTION INTRAMUSCULAR; INTRAVENOUS; SUBCUTANEOUS at 22:59

## 2023-09-08 RX ADMIN — SODIUM CHLORIDE: 9 INJECTION, SOLUTION INTRAVENOUS at 17:15

## 2023-09-08 RX ADMIN — SODIUM CHLORIDE: 9 INJECTION, SOLUTION INTRAVENOUS at 04:22

## 2023-09-08 RX ADMIN — ALPRAZOLAM 0.5 MG: 0.5 TABLET ORAL at 19:37

## 2023-09-08 RX ADMIN — DEXAMETHASONE SODIUM PHOSPHATE 5 MG: 4 INJECTION, SOLUTION INTRA-ARTICULAR; INTRALESIONAL; INTRAMUSCULAR; INTRAVENOUS; SOFT TISSUE at 15:16

## 2023-09-08 RX ADMIN — HYDROMORPHONE HYDROCHLORIDE 1 MG: 1 INJECTION, SOLUTION INTRAMUSCULAR; INTRAVENOUS; SUBCUTANEOUS at 02:05

## 2023-09-08 RX ADMIN — TAMSULOSIN HYDROCHLORIDE 0.4 MG: 0.4 CAPSULE ORAL at 07:46

## 2023-09-08 RX ADMIN — KETAMINE HYDROCHLORIDE 20 MG: 10 INJECTION INTRAMUSCULAR; INTRAVENOUS at 15:14

## 2023-09-08 RX ADMIN — SODIUM CHLORIDE, POTASSIUM CHLORIDE, SODIUM LACTATE AND CALCIUM CHLORIDE: 600; 310; 30; 20 INJECTION, SOLUTION INTRAVENOUS at 13:43

## 2023-09-08 RX ADMIN — PROPOFOL 140 MG: 10 INJECTION, EMULSION INTRAVENOUS at 14:59

## 2023-09-08 RX ADMIN — HYDROMORPHONE HYDROCHLORIDE 1 MG: 1 INJECTION, SOLUTION INTRAMUSCULAR; INTRAVENOUS; SUBCUTANEOUS at 17:55

## 2023-09-08 RX ADMIN — PHENYLEPHRINE HYDROCHLORIDE 100 MCG: 10 INJECTION INTRAVENOUS at 15:28

## 2023-09-08 RX ADMIN — DEXTROAMPHETAMINE SULFATE, DEXTROAMPHETAMINE SACCHARATE, AMPHETAMINE SULFATE AND AMPHETAMINE ASPARTATE 40 MG: 3.75; 3.75; 3.75; 3.75 CAPSULE, EXTENDED RELEASE ORAL at 08:33

## 2023-09-08 RX ADMIN — LIDOCAINE HYDROCHLORIDE 50 ML: 10 INJECTION, SOLUTION INFILTRATION; PERINEURAL at 14:59

## 2023-09-08 RX ADMIN — RIZATRIPTAN BENZOATE 10 MG: 10 TABLET ORAL at 07:22

## 2023-09-08 RX ADMIN — LAMOTRIGINE 200 MG: 200 TABLET ORAL at 07:47

## 2023-09-08 RX ADMIN — ONDANSETRON 4 MG: 2 INJECTION INTRAMUSCULAR; INTRAVENOUS at 12:15

## 2023-09-08 RX ADMIN — FENTANYL CITRATE 50 MCG: 50 INJECTION, SOLUTION INTRAMUSCULAR; INTRAVENOUS at 16:15

## 2023-09-08 RX ADMIN — CEFAZOLIN 1 G: 1 INJECTION, POWDER, FOR SOLUTION INTRAMUSCULAR; INTRAVENOUS at 22:01

## 2023-09-08 RX ADMIN — KETOROLAC TROMETHAMINE 15 MG: 15 INJECTION INTRAMUSCULAR; INTRAVENOUS at 07:46

## 2023-09-08 RX ADMIN — SERTRALINE HYDROCHLORIDE 50 MG: 50 TABLET ORAL at 07:47

## 2023-09-08 RX ADMIN — FENTANYL CITRATE 50 MCG: 50 INJECTION, SOLUTION INTRAMUSCULAR; INTRAVENOUS at 15:10

## 2023-09-08 RX ADMIN — Medication 100 MG: at 15:56

## 2023-09-08 RX ADMIN — SODIUM CHLORIDE, POTASSIUM CHLORIDE, SODIUM LACTATE AND CALCIUM CHLORIDE: 600; 310; 30; 20 INJECTION, SOLUTION INTRAVENOUS at 14:51

## 2023-09-08 RX ADMIN — TOPIRAMATE 25 MG: 25 TABLET, FILM COATED ORAL at 19:38

## 2023-09-08 RX ADMIN — CEFAZOLIN 2 G: 1 INJECTION, POWDER, FOR SOLUTION INTRAMUSCULAR; INTRAVENOUS at 15:09

## 2023-09-08 RX ADMIN — ROCURONIUM BROMIDE 30 MG: 50 INJECTION, SOLUTION INTRAVENOUS at 14:59

## 2023-09-08 RX ADMIN — SENNOSIDES AND DOCUSATE SODIUM 1 TABLET: 50; 8.6 TABLET ORAL at 07:46

## 2023-09-08 RX ADMIN — HYDROMORPHONE HYDROCHLORIDE 0.5 MG: 1 INJECTION, SOLUTION INTRAMUSCULAR; INTRAVENOUS; SUBCUTANEOUS at 04:57

## 2023-09-08 RX ADMIN — SENNOSIDES AND DOCUSATE SODIUM 1 TABLET: 50; 8.6 TABLET ORAL at 19:38

## 2023-09-08 RX ADMIN — HYDROMORPHONE HYDROCHLORIDE 1 MG: 1 INJECTION, SOLUTION INTRAMUSCULAR; INTRAVENOUS; SUBCUTANEOUS at 10:57

## 2023-09-08 RX ADMIN — DEXMEDETOMIDINE HYDROCHLORIDE 8 MCG: 100 INJECTION, SOLUTION INTRAVENOUS at 16:03

## 2023-09-08 RX ADMIN — FENTANYL CITRATE 50 MCG: 50 INJECTION, SOLUTION INTRAMUSCULAR; INTRAVENOUS at 16:25

## 2023-09-08 RX ADMIN — HYDROMORPHONE HYDROCHLORIDE 0.4 MG: 1 INJECTION, SOLUTION INTRAMUSCULAR; INTRAVENOUS; SUBCUTANEOUS at 16:32

## 2023-09-08 RX ADMIN — PHENYLEPHRINE HYDROCHLORIDE 100 MCG: 10 INJECTION INTRAVENOUS at 15:35

## 2023-09-08 ASSESSMENT — ACTIVITIES OF DAILY LIVING (ADL)
TOILETING_ISSUES: NO
CHANGE_IN_FUNCTIONAL_STATUS_SINCE_ONSET_OF_CURRENT_ILLNESS/INJURY: NO
ADLS_ACUITY_SCORE: 35
ADLS_ACUITY_SCORE: 35
FALL_HISTORY_WITHIN_LAST_SIX_MONTHS: NO
ADLS_ACUITY_SCORE: 35
DRESSING/BATHING_DIFFICULTY: NO
ADLS_ACUITY_SCORE: 35
WALKING_OR_CLIMBING_STAIRS_DIFFICULTY: NO
ADLS_ACUITY_SCORE: 18
ADLS_ACUITY_SCORE: 35
ADLS_ACUITY_SCORE: 24
ADLS_ACUITY_SCORE: 35
ADLS_ACUITY_SCORE: 35
DOING_ERRANDS_INDEPENDENTLY_DIFFICULTY: NO
WEAR_GLASSES_OR_BLIND: NO
ADLS_ACUITY_SCORE: 35
ADLS_ACUITY_SCORE: 24
DIFFICULTY_EATING/SWALLOWING: NO
ADLS_ACUITY_SCORE: 35
CONCENTRATING,_REMEMBERING_OR_MAKING_DECISIONS_DIFFICULTY: NO

## 2023-09-08 NOTE — ANESTHESIA CARE TRANSFER NOTE
Patient: Tara Sorenson    Procedure: Procedure(s):  Cystoscopy, RIGHT Ureteroscopy, BILATERAL retrograde Pyelogram, RIGHT Ureteral Stent Placement, RIGHT STONE BASKET EXTRACTION       Diagnosis: Kidney stone [N20.0]  Diagnosis Additional Information: No value filed.    Anesthesia Type:   General     Note:    Oropharynx: oropharynx clear of all foreign objects and spontaneously breathing  Level of Consciousness: drowsy  Oxygen Supplementation: face mask  Level of Supplemental Oxygen (L/min / FiO2): 6  Independent Airway: airway patency satisfactory and stable  Dentition: dentition unchanged  Vital Signs Stable: post-procedure vital signs reviewed and stable  Report to RN Given: handoff report given  Patient transferred to: PACU    Handoff Report: Identifed the Patient, Identified the Reponsible Provider, Reviewed the pertinent medical history, Discussed the surgical course, Reviewed Intra-OP anesthesia mangement and issues during anesthesia, Set expectations for post-procedure period and Allowed opportunity for questions and acknowledgement of understanding    Vitals:  Vitals Value Taken Time   /95 09/08/23 1608   Temp 36.5  C (97.7  F) 09/08/23 1608   Pulse 75 09/08/23 1611   Resp 6 09/08/23 1611   SpO2 100 % 09/08/23 1611   Vitals shown include unvalidated device data.    Electronically Signed By: PIERO Mills CRNA  September 8, 2023  4:13 PM

## 2023-09-08 NOTE — TELEPHONE ENCOUNTER
Spoke with: Dr Jeffers  to add patient on for a Ureteroscopy patient boarding in the ED       Date of surgery: Friday 9-8-23       Location: [facility]       Informed patient they will need a adult : YES      Pre op with provider:  pt in ED      H&P Scheduled in PAC- NA        Pre procedure covid : - NA      Additional imaging: ED        Surgery Packet :NA      Additional comments:

## 2023-09-08 NOTE — PLAN OF CARE
Problem: Plan of Care - These are the overarching goals to be used throughout the patient stay.    Goal: Optimal Comfort and Wellbeing  Outcome: Progressing  Intervention: Monitor Pain and Promote Comfort  Recent Flowsheet Documentation  Taken 9/8/2023 0220 by Dayami Jean-Baptiste, RN  Pain Management Interventions: medication (see MAR)     Problem: Pain Acute  Goal: Optimal Pain Control and Function  Outcome: Progressing  Intervention: Develop Pain Management Plan  Recent Flowsheet Documentation  Taken 9/8/2023 0220 by Dayami Jean-Baptiste, RN  Pain Management Interventions: medication (see MAR)   Goal Outcome Evaluation:         Pt slept all night, c/o intense pain, IV dilaudid given, Ivf running 125ml/hr. Pt up with SBA, able to ambulate to restroom, strained urine, did not pass stone. No fever and no S/S of infection. Mom at bedside.

## 2023-09-08 NOTE — OP NOTE
OPERATIVE REPORT    PREOPERATIVE DIAGNOSIS:    -Right ureteral stone    POSTOPERATIVE DIAGNOSIS: Same    PROCEDURES PERFORMED:   Cystoscopy  Right ureteroscopy of upper pole moiety ureter with stone basket extraction of stone and retrograde pyelogram and ureteral stent placement  Right ureteroscopy of lower pole moiety ureter with retrograde pyelogram and ureteral stent placement  Left retrograde pyelogram    STAFF SURGEON: Luke Jeffers MD    ANESTHESIA: General  ESTIMATED BLOOD LOSS: 1 ml  COMPLICATIONS: None.   SPECIMEN: Stone for analysis   URETERAL STENT(S):  6 x 24 double-J ureteral stent x2 in the right kidney    SIGNIFICANT FINDINGS:   Complete right ureteral duplication.  Stone was identified in the upper pole moiety.  Intractable right flank pain secondary to a possible right distal ureteral stone     BRIEF OPERATIVE INDICATIONS: Tara Sorenson is a(n) 33 year old female who presented with a suspected right distal ureteral stone.  She also had pelvic phleboliths making confident stone identification challenging..  After a discussion of all risks, benefits, and alternatives, the patient elected to proceed with definitive stone management. The patient understands the potential need for more than one procedure to eliminate all stone burden.      DESCRIPTION OF PROCEDURE:  After informed consent was obtained, the patient was transported to the operating room & placed supine on the table. After adequate anesthesia was induced, the patient was placed in lithotomy and prepped and draped in the usual sterile fashion. A timeout was taken to confirm correct patient, procedure and laterality. Pre-operative IV antibiotics were administered.     A 22-Slovenian rigid cystoscope was inserted into a well-lubricated urethra. The urethra was unremarkable without stricture.  Immediately we identified that the right ureteral orifice ease were duplicated.  The left ureteral orifice appeared to be solitary.    We  began the procedure by cannulating the right ureteral orifice of the upper pole.  We were unable to advance the wire very far into the distal ureter as it met immediate obstruction.  We subsequently required an angled tip Glidewire to facilitate passage of the stone and guide the wire to the kidney.  We subsequently gently dilated the distal ureter and exchanged the Glidewire for Super Stiff wire.  We next passed a semirigid ureteroscope adjacent to the wire identifying the distal ureteral stone which was not very large and we were able to basket extract intact.  Next we inspected the adjacent ureter to the lower pole.  We passed a sensor wire through the ureter and up into the kidney.  We next gently dilated the ureter with an 810 dilator and passed a flexible ureteroscope through the orifice and into the kidney.  We were able to identify the lower pole though the angle from the proximal ureter to the kidney was extremely severe and we had difficulty moving the scope around.  We were fairly confident we did not see any intrarenal stones despite findings on preoperative imaging raising the suspicion for nephrocalcinosis.  We performed retrograde pyelograms of the upper and lower pole moieties.  We subsequently passed a 6 x 24 double-J ureteral stents with full curls in the kidney and full curls in the bladder.  Next the bladder was emptied and returned our attention to the left side.    We used the rigid cystoscope to identify the left ureteral orifice.  We passed a sensor wire easily through the orifice and up to the level of the left kidney.  Next we passed an open-ended catheter and performed a retrograde pyelogram distally we were confident there were no filling defects and there was no evidence of hydronephrosis on the left side.  We subsequently elected not to perform ureteroscopy of the side suspecting that the stone seen on CT was perhaps a phlebolith.    The bladder was subsequently emptied.  The strings  were left to the right ureteral stent and taped to the inner thigh to facilitate removal in 1 week    The patient tolerated the procedure well and there were no apparent complications. The patient  was transported to the postanesthesia care unit in stable condition.     POSTOP PLAN:  -Stent removal of the right side approximately 1 week

## 2023-09-08 NOTE — CONSULTS
"Care Management Initial Consult    General Information  Assessment completed with: Parents, Tara was sleeping. Spoke to Mother Quiana  Type of CM/SW Visit: Initial Assessment    Primary Care Provider verified and updated as needed:  (\"no PMD\")   Readmission within the last 30 days: no previous admission in last 30 days      Reason for Consult: discharge planning  Advance Care Planning: Advance Care Planning Reviewed: no concerns identified          Communication Assessment  Patient's communication style: spoken language (English or Bilingual)                          Living Environment:   People in home: significant other  \"Tara and her partner Tara\"  Current living Arrangements: house (\"town house\")      Able to return to prior arrangements: yes       Family/Social Support:  Care provided by: self  Provides care for: no one  Marital Status: Lives with Significant Other  Significant Other       Tara  Description of Support System: Supportive, Involved    Support Assessment: Adequate family and caregiver support, Adequate social supports, Patient communicates needs well met    Current Resources:   Patient receiving home care services: No     Community Resources: None  Equipment currently used at home: none  Supplies currently used at home: Other (\"glasses\")    Employment/Financial:  Employment Status: employed full-time     Employment/ Comments: \"no  history\"  Financial Concerns:     Referral to Financial Worker: No       Does the patient's insurance plan have a 3 day qualifying hospital stay waiver?  No    Lifestyle & Psychosocial Needs:  Social Determinants of Health     Tobacco Use: High Risk (9/7/2023)    Patient History     Smoking Tobacco Use: Every Day     Smokeless Tobacco Use: Never     Passive Exposure: Not on file   Alcohol Use: Not on file   Financial Resource Strain: Not on file   Food Insecurity: Not on file   Transportation Needs: Not on file   Physical Activity: Not on " file   Stress: Not on file   Social Connections: Not on file   Intimate Partner Violence: Not on file   Depression: At risk (4/7/2023)    PHQ-2     PHQ-2 Score: 3   Housing Stability: Not on file       Functional Status:  Prior to admission patient needed assistance:   Dependent ADLs:: Independent, Ambulation-no assistive device  Dependent IADLs:: Independent  Assesssment of Functional Status: At functional baseline    Mental Health Status:  Mental Health Status: Past Concern  Mental Health Management: Medication    Chemical Dependency Status:                Values/Beliefs:  Spiritual, Cultural Beliefs, Jehovah's witness Practices, Values that affect care:                 Additional Information:  Tara lives in a townhouse with her partner Tara. She is independent with ADLs and IADLs. She drives and works full time.    Likely home with no new needs.    Family to transport at discharge.    CM to follow for medical progression of care, discharge recommendations, and final discharge plan.    Soraida Resendiz RN
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
## 2023-09-08 NOTE — PROGRESS NOTES
"      UROLOGY consult note  I personally met with Ms. Marty Sorenson me. overnight she is continue to endorse unrelenting and unremitting pain mainly located around the right flank.  She has been reported to be audibly screaming for much of the past 24 hours.  She has been straining her urine and has not seen a stone pass.    Vital signs:  Temp: 97.5  F (36.4  C) Temp src: Oral BP: 104/66 Pulse: 64   Resp: 14 SpO2: 96 % O2 Device: None (Room air)   Height: 160 cm (5' 3\") Weight: 43.1 kg (95 lb)  Estimated body mass index is 16.83 kg/m  as calculated from the following:    Height as of this encounter: 1.6 m (5' 3\").    Weight as of this encounter: 43.1 kg (95 lb).      I was able to review her outside CT images which I agree reveal mild right-sided hydroureter and some small nonobstructing stones.  There are many stones within the pelvis unable to confidently rule out the possibility of her presumed stones being phleboliths        The following  distinct labs were reviewed    I personally reviewed all applicable laboratory data and went over findings with patient  Significant for:    CBC RESULTS:  Recent Labs   Lab Test 09/08/23  0740 09/07/23  1437 02/09/21  1456 01/08/20  1122 04/22/19  2334   WBC 4.5 5.6 5.9  --  4.9   HGB 11.0* 12.7 13.1 13.8 10.9*    272 251  --  227        BMP RESULTS:  Recent Labs   Lab Test 09/08/23  0740 09/07/23  1437 08/10/23  1551 02/09/21  1456 12/06/19  1025 10/10/19  1313 04/22/19  2334 04/08/19  1116    139 140 140 140   < > 140 140   POTASSIUM 3.8 4.1 3.8 4.2 4.8   < > 3.4* 4.3   CHLORIDE 109* 110* 103 106 104   < > 106 104   CO2 20* 24 24 25 27   < > 24 29   ANIONGAP 8 5* 13 9 9   < > 10 7   GLC 85 85 86 80 83  --  143* 78   BUN 9.2 16.2 19.8 14 18  --  16 13   CR 0.57 0.68 0.70 0.73 0.71  --  0.68 0.66   GFRESTIMATED >90 >90 >90 >60 >60  --  >60 >60   GFRESTBLACK  --   --   --  >60 >60  --  >60 >60    < > = values in this interval not displayed.       CALCIUM " RESULTS:  Recent Labs   Lab Test 09/08/23  0740 09/07/23  1437 08/10/23  1551 02/09/21  1456   YAMILETH 7.7* 8.4* 9.2 8.9       PTH RESULTS:  Recent Labs   Lab Test 07/16/20  0922   PTHI 61     UA RESULTS:   Recent Labs   Lab Test 09/07/23  1520 08/15/21  1150 02/10/20  1906 01/19/20  1342 04/22/19  2344   SG 1.020 1.025 1.025   < > 1.040*   URINEPH 6.5 7.0 6.0   < > 6.0   NITRITE Negative Negative Negative   < > Negative   RBCU 9*  --  10-25*  --  >100*   WBCU 1  --  0-5  --  0-5    < > = values in this interval not displayed.            Assessment/Plan   33 year old year old person with right-sided intractable flank pain, possible right ureteral stone, possible left ureteral stone  -We had a prolonged discussion with the patient as well as her mother.  We reviewed her CT findings together.  Shared decision made to pursue right-sided ureteroscopic interrogation and possible stone treatment.  We would also potentially address the left side and would be prepared to perform left-sided stone treatment based on intraoperative findings.  -We discussed the potential need for ureteral stents and that she will likely have substantial stent discomfort.  We reviewed alternatives and needing stents but that given the uncertain findings they would likely be a protective measure.  -Risk benefits and alternatives to proposed procedure were reviewed and questions answered in detail    CC:  No Ref-Primary, Physician

## 2023-09-08 NOTE — ED NOTES
Patient up to restroom. Strained urine, no evidence of stone. Patient reports burning pain in lower bladder.

## 2023-09-08 NOTE — PHARMACY-ADMISSION MEDICATION HISTORY
Pharmacist Admission Medication History    Admission medication history is complete. The information provided in this note is only as accurate as the sources available at the time of the update.    Medication reconciliation/reorder completed by provider prior to medication history? No    Information Source(s): Patient, Family member, Clinic records, Hospital records, and CareEverywhere/SureScripts via in-person    Pertinent Information: none    Changes made to PTA medication list:  Added: sertraline, alprazolam, and terbinafine  Deleted: lidocaine, magnesium, pyridoxine, and quetiapine  Changed: None           Allergies reviewed with patient and updates made in EHR: yes    Medication History Completed By: Isac Gates RPH 9/7/2023 8:35 PM    Prior to Admission medications    Medication Sig Last Dose Taking? Auth Provider Long Term End Date   ALPRAZolam (XANAX) 0.5 MG tablet Take 0.5 mg by mouth 3 times daily as needed for anxiety Unknown Yes Unknown, Entered By History     amphetamine-dextroamphetamine (ADDERALL XR) 20 MG 24 hr capsule Take 2 capsules (40 mg) by mouth daily No further refills will be given by the [place] [facility]. 9/7/2023 Yes Giuliana Sullivan MD Yes    amphetamine-dextroamphetamine (ADDERALL) 20 MG tablet Take 1 tablet (20 mg) by mouth daily As needed in the PM for attention deficit therapy boost.No further refills will be given by the [place] [facility]. Unknown Yes Giuliana Sullivan MD Yes    ibuprofen (ADVIL/MOTRIN) 600 MG tablet Take 1 tablet (600 mg) by mouth every 8 hours as needed for moderate pain Or migraine. No further refills will be given by the [place] [facility]. Unknown Yes Giuliana Sullivan MD     lamoTRIgine (LAMICTAL) 200 MG tablet Take 1 tablet (200 mg) by mouth daily Take 200 mg by mouth 9/7/2023 Yes Giuliana Sullivan MD Yes    rizatriptan (MAXALT) 10 MG tablet Take 1 tablet (10 mg) by mouth at onset of headache for migraine May repeat in 2 hours. Max 3 tablets/24 hours. Unknown Yes Jim Prieto  MD YASMIN     sertraline (ZOLOFT) 50 MG tablet Take 50 mg by mouth daily 9/7/2023 Yes Unknown, Entered By History Yes    terbinafine (LAMISIL) 250 MG tablet Take 250 mg by mouth daily 9/7/2023 Yes Unknown, Entered By History     topiramate (TOPAMAX) 25 MG tablet Take 1 tablet (25 mg) by mouth daily No further refills will be given by the [place] [facility]. 9/7/2023 Yes Giuliana Sullivan MD Yes

## 2023-09-08 NOTE — PROGRESS NOTES
[facility]    PROGRESS NOTE - Hospitalist Service    Assessment and Plan  53 years old male with a past medical history of ADHD, bipolar disorder, generalized anxiety disorder, PTSD and migraine who presented to ER for complaining of abdominal pain and found to have nephrolithiasis.    Bilateral nephrolithiasis and right ureterolithiasis.  - Patient initially presented to  Urgency Room with acute onset of right flank pain since this morning. UPT was negative. U/A showed 6-10 RBCs  -- CT abd/pelvis showed bilateral non-obstructive nephrolithiasis; mild prominence of right urerter with a 2mm calcification (stone) in the distal ureter; and a 4mm stone in the left renal pelvis  -Urology consult, appreciate input  -Plan for surgery today as patient still in significant pain  - Continue Flomax started by urology  - Keep n.p.o. for procedure  - Continue IV fluid support  - U/A not suggestive of UTI. Will not start her on any antibiotics now.     Right renal colic  - Secondary to nephro/ureterolithiasis  -- Pain control with IV dilaudid and IV toradol prn  -Switch to oral medications after procedure    Multiple psychiatric disorder with, ADHD  Bipolar disrder, AURELIO, PTSD, Mixed obsessional thoughts/acts, Migraine headaches  -- Cont with home meds. Outpatient follow up with PCP and mental health provider    40 MINUTES SPENT BY ME on the date of service doing chart review, history, exam, documentation & further activities per the note    Principal Problem:    Ureterolithiasis      VTE prophylaxis:  Pneumatic Compression Devices  DIET: Orders Placed This Encounter      NPO per Anesthesia Guidelines for Procedure/Surgery Except for: Meds, Ice Chips      Disposition/Barriers to discharge: Pain control, urology procedure today.  Code Status: Full Code    Subjective:  Tara is complaining of abdominal pain still, pain is 7/10.  Has some nausea but no vomiting.  Mother at bedside.    PHYSICAL  EXAM  Vitals:    09/07/23 1411   Weight: 43.1 kg (95 lb)     B/P:119/70 T:97.5 P:69 R:18     Intake/Output Summary (Last 24 hours) at 9/8/2023 1401  Last data filed at 9/8/2023 1327  Gross per 24 hour   Intake 2966 ml   Output 400 ml   Net 2566 ml      Body mass index is 16.83 kg/m .    Constitutional: awake, alert, cooperative, no apparent distress, and appears stated age  Respiratory: No increased work of breathing, good air exchange, clear to auscultation bilaterally, no crackles or wheezing  Cardiovascular: Normal apical impulse, regular rate and rhythm, normal S1 and S2, no S3 or S4, and no murmur noted  GI: Normal bowel sounds, positive tenderness on right lower quadrant.    Skin: no bruising or bleeding and normal skin color, texture, turgor  Musculoskeletal: There is no redness, warmth, or swelling of the joints.  Full range of motion noted.  no lower extremity pitting edema present  Neurologic: Awake, alert, oriented to name, place and time.  Cranial nerves II-XII are grossly intact.  Motor is 5 out of 5 bilaterally.   Sensory is intact.    Neuropsychiatric: Appropriate with examiner      PERTINENT LABS/IMAGING:    I have personally reviewed the following data over the past 24 hrs:    4.5  \   11.0 (L)   / 227     137 109 (H) 9.2 /  85   3.8 20 (L) 0.57 \     Procal: N/A CRP: <3.00 Lactic Acid: N/A         Imaging results reviewed over the past 24 hrs:   No results found for this or any previous visit (from the past 24 hour(s)).    Discussed with patient, family, urology, nursing staff and discharge planner    Chas Mccrary MD  [facility] Medicine Service  [phone]
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
## 2023-09-08 NOTE — PLAN OF CARE
"  Problem: Plan of Care - These are the overarching goals to be used throughout the patient stay.    Goal: Plan of Care Review  Description: The Plan of Care Review/Shift note should be completed every shift.  The Outcome Evaluation is a brief statement about your assessment that the patient is improving, declining, or no change.  This information will be displayed automatically on your shift note.  Outcome: Progressing     Problem: Plan of Care - These are the overarching goals to be used throughout the patient stay.    Goal: Patient-Specific Goal (Individualized)  Description: You can add care plan individualizations to a care plan. Examples of Individualization might be:  \"Parent requests to be called daily at 9am for status\", \"I have a hard time hearing out of my right ear\", or \"Do not touch me to wake me up as it startles me\".  Outcome: Progressing   Goal Outcome Evaluation:         Patient having lots of pain right flank and headache. Up with stand by assist to bathroom. Straining all urine, no stones out. Mother at bedside, supportive.                "
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
## 2023-09-08 NOTE — UTILIZATION REVIEW
"Admission Status; Secondary Review Determination         Under the authority of the Utilization Management Committee, the utilization review process indicated a secondary review on the above patient.  The review outcome is based on review of the medical records, discussions with staff, and applying clinical experience noted on the date of the review.          (x) Observation Status Appropriate - This patient does not meet hospital inpatient criteria and is placed in observation status. If this patient's primary payer is Medicare and was admitted as an inpatient, Condition Code 44 should be used and patient status changed to \"observation\".       RATIONALE FOR DETERMINATION     Ms. Indira Sorenson is a 34 yo female pt with ADD, AURELIO and migraines who presented to the ED with flank pain. CT scan with 4mm ureteral stone but no dilatation.  Urology consulted and recommended IVF, flomax and pain control overnight to see if stone could pass.  No leukocytosis and vitals are stable. Creat and electrolytes normal. Still reporting a lot of pain today and so will be doing to the OR for stone removal and likely ureteral stent placement.      The severity of illness, intensity of service provided, expected LOS and risk for adverse outcome make the care appropriate for further observation; however, doesn't meet criteria for hospital inpatient admission. Dr ANDERSEN notified of this determination and is in agreement.         The information on this document is developed by the utilization review team in order for the business office to ensure compliance.  This only denotes the appropriateness of proper admission status and does not reflect the quality of care rendered.         The definitions of Inpatient Status and Observation Status used in making the determination above are those provided in the CMS Coverage Manual, Chapter 1 and Chapter 6, section 70.4.        Sincerely,    Brigette Maxwell, DO  Utilization Review  Physician " Advisor  [facility]

## 2023-09-09 VITALS
HEIGHT: 63 IN | WEIGHT: 95 LBS | HEART RATE: 71 BPM | SYSTOLIC BLOOD PRESSURE: 97 MMHG | TEMPERATURE: 98.5 F | DIASTOLIC BLOOD PRESSURE: 62 MMHG | RESPIRATION RATE: 18 BRPM | OXYGEN SATURATION: 96 % | BODY MASS INDEX: 16.83 KG/M2

## 2023-09-09 LAB
ALBUMIN SERPL BCG-MCNC: 3.5 G/DL (ref 3.5–5.2)
ALP SERPL-CCNC: 38 U/L (ref 35–104)
ALT SERPL W P-5'-P-CCNC: 10 U/L (ref 0–50)
ANION GAP SERPL CALCULATED.3IONS-SCNC: 8 MMOL/L (ref 7–15)
AST SERPL W P-5'-P-CCNC: 23 U/L (ref 0–45)
BILIRUB SERPL-MCNC: 0.3 MG/DL
BUN SERPL-MCNC: 7.5 MG/DL (ref 6–20)
CALCIUM SERPL-MCNC: 8.1 MG/DL (ref 8.6–10)
CHLORIDE SERPL-SCNC: 105 MMOL/L (ref 98–107)
CREAT SERPL-MCNC: 0.58 MG/DL (ref 0.51–0.95)
DEPRECATED HCO3 PLAS-SCNC: 24 MMOL/L (ref 22–29)
EGFRCR SERPLBLD CKD-EPI 2021: >90 ML/MIN/1.73M2
ERYTHROCYTE [DISTWIDTH] IN BLOOD BY AUTOMATED COUNT: 11.9 % (ref 10–15)
GLUCOSE SERPL-MCNC: 107 MG/DL (ref 70–99)
HCT VFR BLD AUTO: 37.7 % (ref 35–47)
HGB BLD-MCNC: 12.3 G/DL (ref 11.7–15.7)
MCH RBC QN AUTO: 31.5 PG (ref 26.5–33)
MCHC RBC AUTO-ENTMCNC: 32.6 G/DL (ref 31.5–36.5)
MCV RBC AUTO: 97 FL (ref 78–100)
PLATELET # BLD AUTO: 257 10E3/UL (ref 150–450)
POTASSIUM SERPL-SCNC: 3.7 MMOL/L (ref 3.4–5.3)
PROT SERPL-MCNC: 5.5 G/DL (ref 6.4–8.3)
RBC # BLD AUTO: 3.9 10E6/UL (ref 3.8–5.2)
SODIUM SERPL-SCNC: 137 MMOL/L (ref 136–145)
WBC # BLD AUTO: 10.3 10E3/UL (ref 4–11)

## 2023-09-09 PROCEDURE — 250N000013 HC RX MED GY IP 250 OP 250 PS 637: Performed by: UROLOGY

## 2023-09-09 PROCEDURE — 99239 HOSP IP/OBS DSCHRG MGMT >30: CPT | Performed by: INTERNAL MEDICINE

## 2023-09-09 PROCEDURE — 258N000003 HC RX IP 258 OP 636: Performed by: UROLOGY

## 2023-09-09 PROCEDURE — 250N000011 HC RX IP 250 OP 636: Performed by: INTERNAL MEDICINE

## 2023-09-09 PROCEDURE — 250N000013 HC RX MED GY IP 250 OP 250 PS 637: Performed by: INTERNAL MEDICINE

## 2023-09-09 PROCEDURE — 99207 PR NO BILLABLE SERVICE THIS VISIT: CPT | Performed by: INTERNAL MEDICINE

## 2023-09-09 PROCEDURE — 250N000011 HC RX IP 250 OP 636: Performed by: UROLOGY

## 2023-09-09 PROCEDURE — 85027 COMPLETE CBC AUTOMATED: CPT | Performed by: UROLOGY

## 2023-09-09 PROCEDURE — G0378 HOSPITAL OBSERVATION PER HR: HCPCS

## 2023-09-09 PROCEDURE — 80053 COMPREHEN METABOLIC PANEL: CPT | Performed by: UROLOGY

## 2023-09-09 PROCEDURE — 36415 COLL VENOUS BLD VENIPUNCTURE: CPT | Performed by: UROLOGY

## 2023-09-09 RX ORDER — HYDROMORPHONE HYDROCHLORIDE 1 MG/ML
0.5 INJECTION, SOLUTION INTRAMUSCULAR; INTRAVENOUS; SUBCUTANEOUS
Status: COMPLETED | OUTPATIENT
Start: 2023-09-09 | End: 2023-09-09

## 2023-09-09 RX ORDER — TAMSULOSIN HYDROCHLORIDE 0.4 MG/1
0.4 CAPSULE ORAL DAILY
Qty: 7 CAPSULE | Refills: 0 | Status: SHIPPED | OUTPATIENT
Start: 2023-09-09

## 2023-09-09 RX ORDER — ACETAMINOPHEN 325 MG/1
975 TABLET ORAL 3 TIMES DAILY
Status: DISCONTINUED | OUTPATIENT
Start: 2023-09-09 | End: 2023-09-09 | Stop reason: HOSPADM

## 2023-09-09 RX ORDER — TOLTERODINE 2 MG/1
4 CAPSULE, EXTENDED RELEASE ORAL DAILY
Status: DISCONTINUED | OUTPATIENT
Start: 2023-09-09 | End: 2023-09-09 | Stop reason: HOSPADM

## 2023-09-09 RX ORDER — HYDROMORPHONE HYDROCHLORIDE 2 MG/1
2 TABLET ORAL EVERY 6 HOURS PRN
Qty: 20 TABLET | Refills: 0 | Status: SHIPPED | OUTPATIENT
Start: 2023-09-09 | End: 2023-09-14

## 2023-09-09 RX ORDER — ACETAMINOPHEN 500 MG
500-1000 TABLET ORAL EVERY 6 HOURS PRN
Start: 2023-09-09 | End: 2023-09-16

## 2023-09-09 RX ORDER — TOLTERODINE 4 MG/1
4 CAPSULE, EXTENDED RELEASE ORAL DAILY
Qty: 7 CAPSULE | Refills: 0 | Status: SHIPPED | OUTPATIENT
Start: 2023-09-09 | End: 2023-09-16

## 2023-09-09 RX ADMIN — SERTRALINE HYDROCHLORIDE 50 MG: 50 TABLET ORAL at 08:35

## 2023-09-09 RX ADMIN — SODIUM CHLORIDE: 9 INJECTION, SOLUTION INTRAVENOUS at 03:01

## 2023-09-09 RX ADMIN — ALPRAZOLAM 0.5 MG: 0.5 TABLET ORAL at 13:01

## 2023-09-09 RX ADMIN — HYDROMORPHONE HYDROCHLORIDE 0.5 MG: 1 INJECTION, SOLUTION INTRAMUSCULAR; INTRAVENOUS; SUBCUTANEOUS at 11:52

## 2023-09-09 RX ADMIN — CEFAZOLIN 1 G: 1 INJECTION, POWDER, FOR SOLUTION INTRAMUSCULAR; INTRAVENOUS at 06:11

## 2023-09-09 RX ADMIN — DEXTROAMPHETAMINE SULFATE, DEXTROAMPHETAMINE SACCHARATE, AMPHETAMINE SULFATE AND AMPHETAMINE ASPARTATE 30 MG: 3.75; 3.75; 3.75; 3.75 CAPSULE, EXTENDED RELEASE ORAL at 08:34

## 2023-09-09 RX ADMIN — ALPRAZOLAM 0.5 MG: 0.5 TABLET ORAL at 02:57

## 2023-09-09 RX ADMIN — SENNOSIDES AND DOCUSATE SODIUM 1 TABLET: 50; 8.6 TABLET ORAL at 08:34

## 2023-09-09 RX ADMIN — ALPRAZOLAM 0.5 MG: 0.5 TABLET ORAL at 08:46

## 2023-09-09 RX ADMIN — TAMSULOSIN HYDROCHLORIDE 0.4 MG: 0.4 CAPSULE ORAL at 08:33

## 2023-09-09 RX ADMIN — HYDROMORPHONE HYDROCHLORIDE 1 MG: 1 INJECTION, SOLUTION INTRAMUSCULAR; INTRAVENOUS; SUBCUTANEOUS at 02:45

## 2023-09-09 RX ADMIN — TOLTERODINE 4 MG: 2 CAPSULE, EXTENDED RELEASE ORAL at 13:01

## 2023-09-09 RX ADMIN — LAMOTRIGINE 200 MG: 200 TABLET ORAL at 08:33

## 2023-09-09 RX ADMIN — HYDROMORPHONE HYDROCHLORIDE 1 MG: 1 INJECTION, SOLUTION INTRAMUSCULAR; INTRAVENOUS; SUBCUTANEOUS at 08:47

## 2023-09-09 RX ADMIN — DEXTROAMPHETAMINE SACCHARATE, AMPHETAMINE ASPARTATE MONOHYDRATE, DEXTROAMPHETAMINE SULFATE, AND AMPHETAMINE SULFATE 10 MG: 1.25; 1.25; 1.25; 1.25 CAPSULE, EXTENDED RELEASE ORAL at 08:34

## 2023-09-09 RX ADMIN — KETOROLAC TROMETHAMINE 15 MG: 15 INJECTION INTRAMUSCULAR; INTRAVENOUS at 04:31

## 2023-09-09 RX ADMIN — HYDROMORPHONE HYDROCHLORIDE 1 MG: 1 INJECTION, SOLUTION INTRAMUSCULAR; INTRAVENOUS; SUBCUTANEOUS at 06:11

## 2023-09-09 RX ADMIN — ACETAMINOPHEN 975 MG: 325 TABLET ORAL at 13:01

## 2023-09-09 ASSESSMENT — ACTIVITIES OF DAILY LIVING (ADL)
ADLS_ACUITY_SCORE: 24

## 2023-09-09 NOTE — PLAN OF CARE
Problem: Plan of Care - These are the overarching goals to be used throughout the patient stay.    Goal: Plan of Care Review  Description: The Plan of Care Review/Shift note should be completed every shift.  The Outcome Evaluation is a brief statement about your assessment that the patient is improving, declining, or no change.  This information will be displayed automatically on your shift note.  Outcome: Progressing  Flowsheets (Taken 9/8/2023 2134)  Plan of Care Reviewed With: patient   Goal Outcome Evaluation:      Plan of Care Reviewed With: patient  Pt arrived from PCU at 1700 post right Ureteral stone removal with stent placement. Pt alert and oriented x 4, anxious with situation PRN Xanax was given once. PRN Dilaudid x 1 for burning sensation while voiding. Significant other staying over night for emotional support and comfort, which has been approved by nursing supervisors.

## 2023-09-09 NOTE — PROGRESS NOTES
[facility] Progress Note    Patient having tough day with pain and stent issues.  Some bladder pain and flank pain with urination.  She is also having some spasming pain that goes from her kidney to her bladder and to her stomach.  Tried Toradol but had this because of migraine.  Has had limited effect from hydromorphone.    I spoke with her and mentioned that we will try tolterodine 4 mg extended release and I have ordered for her to get this now.  This should help significantly with her bladder pain.  Aware that this will persist until her stent is removed next week.    Patient is okay for discharge from my perspective.    Recommendations:  -Patient should discharge with   tamsulosin 0.4 mg x 1 week  Tolterodine 4 mg extended release daily x1 week daily   Oral narcotic pain medication and stool softener for breakthrough pain    -Her discharge information has been updated with her phone number.  -We will arrange for outpatient follow-up    Please page [facility] pager with any questions or concerns

## 2023-09-09 NOTE — PROGRESS NOTES
Care Management Discharge Note    Discharge Date: 09/09/2023    Discharge Disposition: Home    Discharge Services: None    Discharge DME: None    Discharge Transportation: family or friend will provide    Private pay costs discussed: Not applicable    Does the patient's insurance plan have a 3 day qualifying hospital stay waiver?  No    PAS Confirmation Code: N/A  Patient/family educated on Medicare website which has current facility and service quality ratings: N/A    Education Provided on the Discharge Plan: Per Team  Persons Notified of Discharge Plans: Patient  Patient/Family in Agreement with the Plan: Yes    Handoff Referral Completed: No    Additional Information:  SWCM reviewed chart updates, Pt will discharge home. No home services or additional needs noted. Family transport at discharge.    FADIA Fleming

## 2023-09-09 NOTE — PROGRESS NOTES
[facility]    PROGRESS NOTE - Hospitalist Service    Assessment and Plan  53 years old male with a past medical history of ADHD, bipolar disorder, generalized anxiety disorder, PTSD and migraine who presented to ER for complaining of abdominal pain and found to have nephrolithiasis.     Bilateral nephrolithiasis and right ureterolithiasis.  - Patient initially presented to  [facility] with acute onset of right flank pain since this morning. UPT was negative. U/A showed 6-10 RBCs  -- CT abd/pelvis showed bilateral non-obstructive nephrolithiasis; mild prominence of right urerter with a 2mm calcification (stone) in the distal ureter; and a 4mm stone in the left renal pelvis  -Urology consult, appreciate input  -Plan for surgery today as patient still in significant pain  - Continue Flomax started by urology  - S/P Cystoscopy, Right ureteroscopy of upper pole moiety ureter with stone basket extraction of stone and retrograde pyelogram and ureteral stent placement, Right ureteroscopy of lower pole moiety ureter with retrograde pyelogram and ureteral stent placement and Left retrograde pyelogram on 9/8/2020  - POD#1  - D/Continue IV fluid support  - U/A not suggestive of UTI.  - Started on IV Ancef by urology.  - Post procedure orders as per urology  - Patient tolerating diet    Right renal colic  - Secondary to nephro/ureterolithiasis  - Pain control with IV dilaudid and IV toradol prn  - Switch to oral medications after procedure     Multiple psychiatric disorder with, ADHD  Bipolar disrder, AURELIO, PTSD, Mixed obsessional thoughts/acts, Migraine headaches  -- Cont with home meds. Outpatient follow up with PCP and mental health provider      35 MINUTES SPENT BY ME on the date of service doing chart review, history, exam, documentation & further activities per the note    Principal Problem:    Ureterolithiasis      VTE prophylaxis:  Pneumatic Compression Devices  DIET: Orders Placed This  Encounter      Regular Diet Adult      Disposition/Barriers to discharge: Postprocedure care, as per urology  Code Status: Full Code    Subjective:  Tara is still complaining of some right flank pain and bladder spasm while urinating.  Denies any chest pain or shortness of breath.,  No nausea or vomiting, tolerating oral diet.    PHYSICAL EXAM  Vitals:    09/07/23 1411   Weight: 43.1 kg (95 lb)     B/P:97/62 T:98.5 P:71 R:18     Intake/Output Summary (Last 24 hours) at 9/9/2023 1238  Last data filed at 9/9/2023 1208  Gross per 24 hour   Intake 2563.33 ml   Output 1350 ml   Net 1213.33 ml      Body mass index is 16.83 kg/m .    Constitutional: awake, alert, cooperative, no apparent distress, and appears stated age  Respiratory: No increased work of breathing, good air exchange, clear to auscultation bilaterally, no crackles or wheezing  Cardiovascular: Normal apical impulse, regular rate and rhythm, normal S1 and S2, no S3 or S4, and no murmur noted  GI: No scars, normal bowel sounds, soft, non-distended, non-tender, no masses palpated, no hepatosplenomegally  Skin: no bruising or bleeding and normal skin color, texture, turgor  Musculoskeletal: There is no redness, warmth, or swelling of the joints.  Full range of motion noted.  no lower extremity pitting edema present  Neurologic: Awake, alert, oriented to name, place and time.  Cranial nerves II-XII are grossly intact.  Motor is 5 out of 5 bilaterally.   Sensory is intact.    Neuropsychiatric: Appropriate with examiner      PERTINENT LABS/IMAGING:    I have personally reviewed the following data over the past 24 hrs:    10.3  \   12.3   / 257     137 105 7.5 /  107 (H)   3.7 24 0.58 \     ALT: 10 AST: 23 AP: 38 TBILI: 0.3   ALB: 3.5 TOT PROTEIN: 5.5 (L) LIPASE: N/A       Imaging results reviewed over the past 24 hrs:   Recent Results (from the past 24 hour(s))   XR Surgery JOSIE Fluoro L/T 5 Min    Narrative    This exam was marked as non-reportable because it  will not be read by a   radiologist or a [place] non-radiologist provider.             Discussed with patient, family, nursing staff and discharge planner    Chas Mccrary MD  [facility] Medicine Service  [phone]

## 2023-09-09 NOTE — PLAN OF CARE
Problem: Plan of Care - These are the overarching goals to be used throughout the patient stay.    Goal: Absence of Hospital-Acquired Illness or Injury  Intervention: Identify and Manage Fall Risk  Recent Flowsheet Documentation  Taken 9/9/2023 0847 by Zamudio, Jacy, RN  Safety Promotion/Fall Prevention:   activity supervised   assistive device/personal items within reach   lighting adjusted   nonskid shoes/slippers when out of bed   safety round/check completed     Problem: Plan of Care - These are the overarching goals to be used throughout the patient stay.    Goal: Absence of Hospital-Acquired Illness or Injury  Intervention: Prevent Skin Injury  Recent Flowsheet Documentation  Taken 9/9/2023 0847 by Jacy Zamudio RN  Body Position:   position changed independently   position maintained     Problem: Plan of Care - These are the overarching goals to be used throughout the patient stay.    Goal: Optimal Comfort and Wellbeing  Intervention: Monitor Pain and Promote Comfort  Recent Flowsheet Documentation  Taken 9/9/2023 1301 by Jacy Zamudio RN  Pain Management Interventions:   medication (see MAR)   cold applied  Taken 9/9/2023 1220 by Jacy Zamudio RN  Pain Management Interventions:   care clustered   cold applied   distraction   emotional support  Taken 9/9/2023 1152 by Jacy Zamudio RN  Pain Management Interventions:   medication (see MAR)   cold applied  Taken 9/9/2023 0915 by Jacy Zamudio RN  Pain Management Interventions:   care clustered   cold applied   distraction   pillow support provided   quiet environment facilitated   rest  Taken 9/9/2023 0847 by Jacy Zamudio RN  Pain Management Interventions: medication (see MAR)     Problem: Pain Acute  Goal: Optimal Pain Control and Function  Intervention: Develop Pain Management Plan  Recent Flowsheet Documentation  Taken 9/9/2023 1301 by Jacy Zamudio RN  Pain Management Interventions:   medication (see  MAR)   cold applied  Taken 9/9/2023 1220 by Jacy Zamudio RN  Pain Management Interventions:   care clustered   cold applied   distraction   emotional support  Taken 9/9/2023 1152 by Jacy Zamudio RN  Pain Management Interventions:   medication (see MAR)   cold applied  Taken 9/9/2023 0915 by Jacy Zamudio RN  Pain Management Interventions:   care clustered   cold applied   distraction   pillow support provided   quiet environment facilitated   rest  Taken 9/9/2023 0847 by Jacy Zamudio RN  Pain Management Interventions: medication (see MAR)     Problem: Pain Acute  Goal: Optimal Pain Control and Function  Intervention: Prevent or Manage Pain  Recent Flowsheet Documentation  Taken 9/9/2023 0847 by Jacy Zamudio RN  Medication Review/Management: medications reviewed   Goal Outcome Evaluation:  Patient alert and oriented x4. Moving with stand by assist. BP soft in AM, asymptomatic. On room air. Regular diet with fair intake. NS running at 125 mL/hr. IV antibiotics. Patient complaining of right flank pain and burning with urination. Urine light joe in color. Pain managed using PRN IV 1 mg dilaudid, PRN 0.5 mg IV dilaudid, scheduled tylenol and intermittent ice use.

## 2023-09-09 NOTE — ANESTHESIA POSTPROCEDURE EVALUATION
Patient: Tara Sorenson    Procedure: Procedure(s):  Cystoscopy, RIGHT Ureteroscopy, BILATERAL retrograde Pyelogram, RIGHT Ureteral Stent Placement, RIGHT STONE BASKET EXTRACTION       Anesthesia Type:  General    Note:  Disposition: Outpatient   Postop Pain Control: Uneventful            Sign Out: Well controlled pain   PONV: No   Neuro/Psych: Uneventful            Sign Out: Acceptable/Baseline neuro status   Airway/Respiratory: Uneventful            Sign Out: Acceptable/Baseline resp. status   CV/Hemodynamics: Uneventful            Sign Out: Acceptable CV status; No obvious hypovolemia; No obvious fluid overload   Other NRE: NONE   DID A NON-ROUTINE EVENT OCCUR? No           Last vitals:  Vitals Value Taken Time   BP 90/52 09/08/23 1645   Temp 36.5  C (97.7  F) 09/08/23 1608   Pulse 84 09/08/23 1653   Resp 24 09/08/23 1653   SpO2 100 % 09/08/23 1653   Vitals shown include unvalidated device data.    Electronically Signed By: Liam Hdez MD  September 9, 2023  4:48 AM

## 2023-09-09 NOTE — DISCHARGE SUMMARY
"[facility]  Hospitalist Discharge Summary      Date of Admission:  9/7/2023  Date of Discharge:  9/9/2023  Discharging Provider: Chas Mccrary MD  Discharge Service: Hospitalist Service    Discharge Diagnoses   Bilateral nephrolithiasis and right ureterolithiasis  S/P Cystoscopy, Right ureteroscopy of upper pole moiety ureter with stone basket extraction of stone and retrograde pyelogram and ureteral stent placement, Right ureteroscopy of lower pole moiety ureter with retrograde pyelogram and ureteral stent placement and Left retrograde pyelogram on 9/8/2020   Right renal colic, improving  Multiple psychotic disorder with ADHD, bipolar, PTSD      Clinically Significant Risk Factors     # Cachexia: Estimated body mass index is 16.83 kg/m  as calculated from the following:    Height as of this encounter: 1.6 m (5' 3\").    Weight as of this encounter: 43.1 kg (95 lb).       Follow-ups Needed After Discharge   Follow-up Appointments     Follow-up and recommended labs and tests       Follow up with primary care provider, Physician No Ref-Primary, within 7   days for hospital follow- up.  No follow up labs or test are needed.    Follow up with urology as scheduled            Unresulted Labs Ordered in the Past 30 Days of this Admission       Date and Time Order Name Status Description    9/8/2023  4:12 PM Stone analysis In process         These results will be followed up by urology    Discharge Disposition   Discharged to home  Condition at discharge: Stable    Hospital Course   53 years old male with a past medical history of ADHD, bipolar disorder, generalized anxiety disorder, PTSD and migraine who presented to ER for complaining of abdominal pain and found to have nephrolithiasis.  Please refer to H&P for details     Bilateral nephrolithiasis and right ureterolithiasis.  - Patient initially presented to  [facility] with acute onset of right flank pain since this morning. UPT was " negative. U/A showed 6-10 RBCs  -- CT abd/pelvis showed bilateral non-obstructive nephrolithiasis; mild prominence of right urerter with a 2mm calcification (stone) in the distal ureter; and a 4mm stone in the left renal pelvis  -Urology consult, appreciate input  -Plan for surgery today as patient still in significant pain  - Continue Flomax started by urology  - S/P Cystoscopy, Right ureteroscopy of upper pole moiety ureter with stone basket extraction of stone and retrograde pyelogram and ureteral stent placement, Right ureteroscopy of lower pole moiety ureter with retrograde pyelogram and ureteral stent placement and Left retrograde pyelogram on 9/8/2020  - POD#1  - D/Continue IV fluid support  - U/A not suggestive of UTI.  - Started on IV Ancef by urology.  - Post procedure orders as per urology  - Patient tolerating diet  - Discharge today as per urology  - Follow-up with urology as outpatient     Right renal colic  - Secondary to nephro/ureterolithiasis  - Pain control with IV dilaudid and IV toradol prn  - Switch to oral medications after procedure  - Add Detrol for bladder spasm as per urology     Multiple psychiatric disorder with, ADHD  Bipolar disrder, AURELIO, PTSD, Mixed obsessional thoughts/acts, Migraine headaches  -- Cont with home meds. Outpatient follow up with PCP and mental health provider       Consultations This Hospital Stay   UROLOGY IP CONSULT  CARE MANAGEMENT / SOCIAL WORK IP CONSULT    Code Status   Full Code    Time Spent on this Encounter   IChas MD, personally saw the patient today and spent greater than 30 minutes discharging this patient.       Chas Mccrary MD  [address] [address]  Phone: [phone]  Fax: [phone]  ______________________________________________________________________    Physical Exam   Vital Signs: Temp: 98.5  F (36.9  C) Temp src: Oral BP: 97/62 Pulse: 71   Resp: 18 SpO2: 96 % O2  Device: None (Room air) Oxygen Delivery: 7 LPM  Weight: 95 lbs 0 oz  Constitutional: awake, alert, cooperative, no apparent distress, and appears stated age  Respiratory: No increased work of breathing, good air exchange, clear to auscultation bilaterally, no crackles or wheezing  Cardiovascular: Normal apical impulse, regular rate and rhythm, normal S1 and S2, no S3 or S4, and no murmur noted  GI: No scars, normal bowel sounds, soft, non-distended, non-tender, no masses palpated, no hepatosplenomegally  Skin: no bruising or bleeding and normal skin color, texture, turgor  Musculoskeletal: There is no redness, warmth, or swelling of the joints.  Full range of motion noted.  no lower extremity pitting edema present  Neurologic: Awake, alert, oriented to name, place and time.  Cranial nerves II-XII are grossly intact.  Motor is 5 out of 5 bilaterally.   Sensory is intact.    Neuropsychiatric: Appropriate with examiner       Primary Care Physician   Physician No Ref-Primary    Discharge Orders      Reason for your hospital stay    Right nephro/ureterolithiasis s/p surgery     Follow-up and recommended labs and tests     Follow up with primary care provider, Physician No Ref-Primary, within 7 days for hospital follow- up.  No follow up labs or test are needed.    Follow up with urology as scheduled     Activity    Your activity upon discharge: activity as tolerated     Diet    Follow this diet upon discharge: Orders Placed This Encounter      Regular Diet Adult       Significant Results and Procedures   Most Recent 3 CBC's:  Recent Labs   Lab Test 09/09/23  0601 09/08/23  0740 09/07/23  1437   WBC 10.3 4.5 5.6   HGB 12.3 11.0* 12.7   MCV 97 97 95    227 272     Most Recent 3 BMP's:  Recent Labs   Lab Test 09/09/23  0601 09/08/23  0740 09/07/23  1437    137 139   POTASSIUM 3.7 3.8 4.1   CHLORIDE 105 109* 110*   CO2 24 20* 24   BUN 7.5 9.2 16.2   CR 0.58 0.57 0.68   ANIONGAP 8 8 5*   YAMILETH 8.1* 7.7* 8.4*   GLC  107* 85 85   ,   Results for orders placed or performed during the hospital encounter of 09/07/23   XR Surgery JOSIE Fluoro L/T 5 Min    Narrative    This exam was marked as non-reportable because it will not be read by a   radiologist or a [place] non-radiologist provider.         CT External Imaging Abdomen    Narrative    Images were obtained from an external facility.  Click PACS Images   hyperlink to view images.  Textual results have been scanned into the   media tab.       Discharge Medications   Current Discharge Medication List        START taking these medications    Details   acetaminophen (TYLENOL) 500 MG tablet Take 1-2 tablets (500-1,000 mg) by mouth every 6 hours as needed for mild pain    Associated Diagnoses: Ureterolithiasis      HYDROmorphone (DILAUDID) 2 MG tablet Take 1 tablet (2 mg) by mouth every 6 hours as needed for severe pain or breakthrough pain  Qty: 20 tablet, Refills: 0    Associated Diagnoses: Ureterolithiasis      tamsulosin (FLOMAX) 0.4 MG capsule Take 1 capsule (0.4 mg) by mouth daily  Qty: 7 capsule, Refills: 0    Associated Diagnoses: Ureterolithiasis      tolterodine ER (DETROL LA) 4 MG 24 hr capsule Take 1 capsule (4 mg) by mouth daily for 7 days  Qty: 7 capsule, Refills: 0    Associated Diagnoses: Ureterolithiasis           CONTINUE these medications which have NOT CHANGED    Details   ALPRAZolam (XANAX) 0.5 MG tablet Take 0.5 mg by mouth 3 times daily as needed for anxiety      amphetamine-dextroamphetamine (ADDERALL XR) 20 MG 24 hr capsule Take 2 capsules (40 mg) by mouth daily No further refills will be given by the [facility].  Qty: 60 capsule, Refills: 0    Associated Diagnoses: Attention deficit hyperactivity disorder (ADHD), combined type      amphetamine-dextroamphetamine (ADDERALL) 20 MG tablet Take 1 tablet (20 mg) by mouth daily As needed in the PM for attention deficit therapy boost.No further refills will be given by the [facility].  Qty: 30 tablet, Refills:  "0    Associated Diagnoses: Attention deficit hyperactivity disorder (ADHD), combined type      ibuprofen (ADVIL/MOTRIN) 600 MG tablet Take 1 tablet (600 mg) by mouth every 8 hours as needed for moderate pain Or migraine. No further refills will be given by the [facility].  Qty: 60 tablet, Refills: 0    Associated Diagnoses: Nonintractable headache, unspecified chronicity pattern, unspecified headache type      lamoTRIgine (LAMICTAL) 200 MG tablet Take 1 tablet (200 mg) by mouth daily Take 200 mg by mouth  Qty: 30 tablet, Refills: 0    Associated Diagnoses: Bipolar affective disorder, remission status unspecified (H); Migraine without aura and with status migrainosus, not intractable      rizatriptan (MAXALT) 10 MG tablet Take 1 tablet (10 mg) by mouth at onset of headache for migraine May repeat in 2 hours. Max 3 tablets/24 hours.  Qty: 30 tablet, Refills: 3    Associated Diagnoses: Migraine without aura and with status migrainosus, not intractable      sertraline (ZOLOFT) 50 MG tablet Take 50 mg by mouth daily      terbinafine (LAMISIL) 250 MG tablet Take 250 mg by mouth daily      topiramate (TOPAMAX) 25 MG tablet Take 1 tablet (25 mg) by mouth daily No further refills will be given by the [facility].  Qty: 30 tablet, Refills: 0    Associated Diagnoses: Migraine without aura and with status migrainosus, not intractable           Allergies   Allergies   Allergen Reactions    Haloperidol Anxiety     Felt anxious at 20hrs post single 2mg IV dose of haloperidol lactate   Felt anxious at 20hrs post single 2mg IV dose of haloperidol lactate       Diphenhydramine Unknown     had previously tolerated    Metoclopramide Other (See Comments)     \"It makes me feel like jumping out of my skin.\"    Prochlorperazine Other (See Comments)     \"It makes me feel like jumping out of my skin.\"     "

## 2023-09-09 NOTE — PROGRESS NOTES
Discharge paperwork discussed with patient. Questions were answered and encouraged. Belongings sent home with patient. Patient escorted to main entrance via wheelchair where mother is transporting home.

## 2023-09-09 NOTE — DISCHARGE INSTRUCTIONS
"  Post-Operative Symptom Control    While you recover from your procedure, you can take steps to ease your recovery.    Diet and Fluids:    Eating your normal diet is fine.  Drink a little more than normal but you don not have to \"flush\" your system.    Activity:    There are no activity restrictions after stone surgery.  Some people find bending twisting movements cause discomfort or increased blood in urine.  Activity may irritating but you will not hurt yourself.    Medications: (That may be suggested or prescribed)    Ibuprofen (Advil/Motrin)-Available over the counter.  Take 2 (200mg) tablets at bedtime and every six hour for base pain management.      Dramamine (brand name drowsy version)-Is available over the counter.  Take 50 mg at bedtime and every 6 hours as needed.  This medication can be helpful by:   Decreasing nausea   Decrease acute pain   Decrease recurrence of pain for 24 hours  *This medication my cause increased drowsiness, do not drive or operate machinery within 6 hours.    Flomax (Tamsulosin)-After surgery Flomax has several potential benefits   Decreased stent discomfort   Increased likelihood passage of small stone fragments   Take daily with food until your stent is removed  *This may cause nasal congestion or light-headedness    Detrol (Tolterodine)-After surgery Detrol may decrease stent irritation and pelvic pain   Take as prescribed  *This medication may cause dry mouth, constipation or blurry vision.    Narcotics (oxycodone)   Take as prescribed for severe pain   Narcotics have significant side effects and only \"cover-up\" pain.  They have no effect on cause of pain.     Common side effects  Confusion, disorientation and sedation-DO NOT DRIVE OR OPERATE MACHINERY WITH IN 24 HOURS OF TAKING NARCOTICS    Nausea-take Dramamine or Zofran to help control  Constipation  Sleep Disturbances    Ondansetron (Zofran)   Take as prescribed   Reserve for severe nausea   May cause constipation, start " "over the counter Miralax if needed to treat.    Second Line Anti-Nausea Medication:  Adding a different anti-nausea medication may be helpful for persistent nausea.  The combined effect of different types of anti-nausea medications may be more effective than either medication by itself, even in higher doses.     Call the Clinic Immediately If You Have Any Of The Following Symptoms:   Nausea/vomiting that is uncontrolled with medications   You have a fever over 100.0   Chills   Are not able to urinate for 8 hours    Increasing back pain that is not relieved with pain medications   Large amounts of blood in urine or large clots    We can respond to your questions or concerns 24 hours a day at [phone].   For after hours phone calls please wait on call to be connected with the \"care connection\" service for after hours care.     Going Home With a Ureteral Stent    What is it?  A stent is a soft, plastic tube that helps urine (pee) drain into the bladder.  During the surgery, it is placed in the ureter the tube that connects the kidney to the bladder.  A thin curl at each end of the stent keeps one end in your kidney and the other in your bladder.  The stent can not be seen from outside of the body.    Why Do I Have It?  Some sort of blockage is not letting pee drain into your bladder.  This could be from a stone, certain surgeries or kidney infection.    What Should I Expect?   Stents often cause some discomfort.  You may have:    The need to pee suddenly    Pain when you pee    A dull backache, which may get worse when you pee  Blood in your pee (color of fruit punch) and some clots, which may increase with physical activity.     What Can I Do To Feel Better?    Drink a little more fluids than usual.  You can eat your normal diet.    Enjoy a warm bath.  Decrease your activity.  Some people find bending or twisting movement cause discomfort or increased blood in the urine.  Even so, you will not harm " yourself.    Your stent can be removed on Wednesday 9/13/2023 by pulling on the string until you see both curls.  If the stent is left in more than 3 months, it can lead to a need for procedure in order to remove it, permanent kidney damage or loss.    Follow up:  We will have you follow-up in clinic in 2 to 3 months after kidney renal ultrasound.     Please complete your lab testing and 24 hr urine testing around the same time, at least 2 weeks prior to your follow up appointment.

## 2023-09-09 NOTE — PLAN OF CARE
Problem: Pain Acute  Goal: Optimal Pain Control and Function  Outcome: Progressing  Intervention: Develop Pain Management Plan  Recent Flowsheet Documentation  Taken 9/9/2023 0245 by Arleen Brown RN  Pain Management Interventions:   medication (see MAR)   cold applied   emotional support   repositioned     Problem: Plan of Care - These are the overarching goals to be used throughout the patient stay.    Goal: Optimal Comfort and Wellbeing  Intervention: Monitor Pain and Promote Comfort  Recent Flowsheet Documentation  Taken 9/9/2023 0245 by Arleen Brown RN  Pain Management Interventions:   medication (see MAR)   cold applied   emotional support   repositioned   Goal Outcome Evaluation:    Right flank/abdominal pain managed with ice packs, IV dilaudid and IV Toradol. Urination painful. Urine light joe, no blood noted. Scheduled IV antibiotics and IVF  ml/hr.

## 2023-09-11 ENCOUNTER — LAB REQUISITION (OUTPATIENT)
Dept: LAB | Facility: CLINIC | Age: 33
End: 2023-09-11
Payer: COMMERCIAL

## 2023-09-11 ENCOUNTER — TELEPHONE (OUTPATIENT)
Dept: UROLOGY | Facility: CLINIC | Age: 33
End: 2023-09-11
Payer: COMMERCIAL

## 2023-09-11 DIAGNOSIS — R31.0 GROSS HEMATURIA: ICD-10-CM

## 2023-09-11 PROCEDURE — 87086 URINE CULTURE/COLONY COUNT: CPT | Mod: ORL | Performed by: FAMILY MEDICINE

## 2023-09-11 NOTE — TELEPHONE ENCOUNTER
Spoke with patient regarding her ureteral stent symptoms and reassured her that all of there symptoms are normal.  No fever/chills.  She will continue to treat her discomfort with protocol medications.  Work note emailed per her request to go back to work on Monday after her stent is removed on Friday.  She will call with any other issues or questions.  Angie Lr RN

## 2023-09-12 LAB
APPEARANCE STONE: NORMAL
COMPN STONE: NORMAL
SPECIMEN WT: 6 MG

## 2023-09-13 DIAGNOSIS — N20.1 URETEROLITHIASIS: ICD-10-CM

## 2023-09-13 LAB — BACTERIA UR CULT: NO GROWTH

## 2023-09-17 ENCOUNTER — HOSPITAL ENCOUNTER (EMERGENCY)
Facility: HOSPITAL | Age: 33
Discharge: HOME | End: 2023-09-17
Attending: FAMILY MEDICINE | Admitting: FAMILY MEDICINE
Payer: COMMERCIAL

## 2023-09-17 VITALS
BODY MASS INDEX: 16.83 KG/M2 | WEIGHT: 95 LBS | TEMPERATURE: 97.6 F | HEART RATE: 61 BPM | RESPIRATION RATE: 16 BRPM | HEIGHT: 63 IN | DIASTOLIC BLOOD PRESSURE: 84 MMHG | OXYGEN SATURATION: 100 % | SYSTOLIC BLOOD PRESSURE: 115 MMHG

## 2023-09-17 DIAGNOSIS — Z87.442 HISTORY OF NEPHROLITHIASIS: ICD-10-CM

## 2023-09-17 DIAGNOSIS — Z46.6 ENCOUNTER FOR REMOVAL OF URETERAL STENT: ICD-10-CM

## 2023-09-17 LAB
ALBUMIN SERPL BCG-MCNC: 4.1 G/DL (ref 3.5–5.2)
ALBUMIN UR-MCNC: 30 MG/DL
ALP SERPL-CCNC: 61 U/L (ref 35–104)
ALT SERPL W P-5'-P-CCNC: 13 U/L (ref 0–50)
ANION GAP SERPL CALCULATED.3IONS-SCNC: 9 MMOL/L (ref 7–15)
APPEARANCE UR: ABNORMAL
AST SERPL W P-5'-P-CCNC: 22 U/L (ref 0–45)
BACTERIA #/AREA URNS HPF: ABNORMAL /HPF
BASOPHILS # BLD AUTO: 0 10E3/UL (ref 0–0.2)
BASOPHILS NFR BLD AUTO: 1 %
BILIRUB SERPL-MCNC: <0.2 MG/DL
BILIRUB UR QL STRIP: NEGATIVE
BUN SERPL-MCNC: 14.4 MG/DL (ref 6–20)
CALCIUM SERPL-MCNC: 9.5 MG/DL (ref 8.6–10)
CHLORIDE SERPL-SCNC: 106 MMOL/L (ref 98–107)
COLOR UR AUTO: ABNORMAL
CREAT SERPL-MCNC: 0.67 MG/DL (ref 0.51–0.95)
DEPRECATED HCO3 PLAS-SCNC: 27 MMOL/L (ref 22–29)
EGFRCR SERPLBLD CKD-EPI 2021: >90 ML/MIN/1.73M2
EOSINOPHIL # BLD AUTO: 0.1 10E3/UL (ref 0–0.7)
EOSINOPHIL NFR BLD AUTO: 2 %
ERYTHROCYTE [DISTWIDTH] IN BLOOD BY AUTOMATED COUNT: 12.2 % (ref 10–15)
FLUAV RNA SPEC QL NAA+PROBE: NEGATIVE
FLUBV RNA RESP QL NAA+PROBE: NEGATIVE
GLUCOSE SERPL-MCNC: 100 MG/DL (ref 70–99)
GLUCOSE UR STRIP-MCNC: NEGATIVE MG/DL
HCT VFR BLD AUTO: 33.7 % (ref 35–47)
HGB BLD-MCNC: 11.1 G/DL (ref 11.7–15.7)
HGB UR QL STRIP: ABNORMAL
IMM GRANULOCYTES # BLD: 0 10E3/UL
IMM GRANULOCYTES NFR BLD: 0 %
KETONES UR STRIP-MCNC: NEGATIVE MG/DL
LEUKOCYTE ESTERASE UR QL STRIP: ABNORMAL
LYMPHOCYTES # BLD AUTO: 1.8 10E3/UL (ref 0.8–5.3)
LYMPHOCYTES NFR BLD AUTO: 31 %
MCH RBC QN AUTO: 31.4 PG (ref 26.5–33)
MCHC RBC AUTO-ENTMCNC: 32.9 G/DL (ref 31.5–36.5)
MCV RBC AUTO: 95 FL (ref 78–100)
MONOCYTES # BLD AUTO: 0.5 10E3/UL (ref 0–1.3)
MONOCYTES NFR BLD AUTO: 8 %
MUCOUS THREADS #/AREA URNS LPF: PRESENT /LPF
NEUTROPHILS # BLD AUTO: 3.4 10E3/UL (ref 1.6–8.3)
NEUTROPHILS NFR BLD AUTO: 58 %
NITRATE UR QL: NEGATIVE
NRBC # BLD AUTO: 0 10E3/UL
NRBC BLD AUTO-RTO: 0 /100
PH UR STRIP: 7.5 [PH] (ref 5–7)
PLATELET # BLD AUTO: 350 10E3/UL (ref 150–450)
POTASSIUM SERPL-SCNC: 4 MMOL/L (ref 3.4–5.3)
PROT SERPL-MCNC: 6.4 G/DL (ref 6.4–8.3)
RBC # BLD AUTO: 3.54 10E6/UL (ref 3.8–5.2)
RBC URINE: >182 /HPF
RSV RNA SPEC NAA+PROBE: NEGATIVE
SARS-COV-2 RNA RESP QL NAA+PROBE: NEGATIVE
SODIUM SERPL-SCNC: 142 MMOL/L (ref 136–145)
SP GR UR STRIP: 1.01 (ref 1–1.03)
SQUAMOUS EPITHELIAL: 3 /HPF
UROBILINOGEN UR STRIP-MCNC: <2 MG/DL
WBC # BLD AUTO: 5.8 10E3/UL (ref 4–11)
WBC URINE: 31 /HPF

## 2023-09-17 PROCEDURE — 81001 URINALYSIS AUTO W/SCOPE: CPT

## 2023-09-17 PROCEDURE — 96374 THER/PROPH/DIAG INJ IV PUSH: CPT

## 2023-09-17 PROCEDURE — 87086 URINE CULTURE/COLONY COUNT: CPT | Performed by: FAMILY MEDICINE

## 2023-09-17 PROCEDURE — 250N000011 HC RX IP 250 OP 636

## 2023-09-17 PROCEDURE — 99284 EMERGENCY DEPT VISIT MOD MDM: CPT | Mod: 25

## 2023-09-17 PROCEDURE — 80053 COMPREHEN METABOLIC PANEL: CPT

## 2023-09-17 PROCEDURE — 81001 URINALYSIS AUTO W/SCOPE: CPT | Performed by: FAMILY MEDICINE

## 2023-09-17 PROCEDURE — 85025 COMPLETE CBC W/AUTO DIFF WBC: CPT

## 2023-09-17 PROCEDURE — 36415 COLL VENOUS BLD VENIPUNCTURE: CPT

## 2023-09-17 PROCEDURE — 87637 SARSCOV2&INF A&B&RSV AMP PRB: CPT

## 2023-09-17 RX ORDER — HYDROMORPHONE HYDROCHLORIDE 1 MG/ML
0.5 INJECTION, SOLUTION INTRAMUSCULAR; INTRAVENOUS; SUBCUTANEOUS ONCE
Status: COMPLETED | OUTPATIENT
Start: 2023-09-17 | End: 2023-09-17

## 2023-09-17 RX ORDER — HYDROMORPHONE HYDROCHLORIDE 2 MG/1
2 TABLET ORAL EVERY 6 HOURS PRN
Qty: 5 TABLET | Refills: 0 | Status: SHIPPED | OUTPATIENT
Start: 2023-09-17 | End: 2023-09-20

## 2023-09-17 RX ADMIN — HYDROMORPHONE HYDROCHLORIDE 0.5 MG: 1 INJECTION, SOLUTION INTRAMUSCULAR; INTRAVENOUS; SUBCUTANEOUS at 11:38

## 2023-09-17 ASSESSMENT — ENCOUNTER SYMPTOMS
CHILLS: 1
COUGH: 0
COLOR CHANGE: 0
DYSURIA: 1
ABDOMINAL PAIN: 1
SORE THROAT: 0
FEVER: 1
RHINORRHEA: 0
HEMATURIA: 1

## 2023-09-17 ASSESSMENT — ACTIVITIES OF DAILY LIVING (ADL)
ADLS_ACUITY_SCORE: 35
ADLS_ACUITY_SCORE: 35

## 2023-09-17 NOTE — ED TRIAGE NOTES
"Pt was here last week for kidney stones.  Pt had stent placed in right kidney.  Went to follow up appointment at Urology and was told to come back to ED to have stent taken out.  \"Stent was only supposed to be in for 7 days.  I have wires sticking out my [facility]\".  Pt reporting right flank pain that radiates to groin.     Triage Assessment       Row Name 09/17/23 1023       Triage Assessment (Adult)    Airway WDL WDL       Respiratory WDL    Respiratory WDL WDL                    "
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
## 2023-09-17 NOTE — DISCHARGE INSTRUCTIONS
Your stents were removed in the ER today. Please call your urology clinic tomorrow and schedule follow up in 6-8 weeks for x-ray and ultrasound.     Your pain will likely increase over the next 24 hours, but then should start to improve. Rest, stay well hydrated. You may take ibuprofen and Tylenol as needed for pain. You may take Dilaudid as needed for severe pain - do NOT drive on this medication as it can cause drowsiness. Additionally, please take a stool softener as this medication can cause constipation. This medication can be addicting, please limit your use and discard any remaining tablets.     Ibuprofen/Naproxen Discharge Instructions:  You may take ibuprofen for pain control.  The maximum dose of (ibuprofen is 3200 mg ) in a 24-hour period.    Take this medication with food to prevent stomach irritation.  With long-term use this medication can irritate the stomach causing pain and lead to development of a stomach ulcer.  If you notice stomach pain or vomiting of coffee-ground colored vomit or blood, please be seen by a healthcare provider.  Attempt to use this medication for the shortest time possible.      Tylenol (Acetaminophen) Discharge Instructions:  You may take 2 tablets of regular strength, over-the-counter, Tylenol (acetaminophen) every 4-6 hours as needed for pain.  Take no more than 4000 mg of Tylenol in a 24-hour period.      Avoid taking more than 1 acetaminophen-containing product at a time and be aware that many over-the-counter medications contain a combination of acetaminophen and other products.  If you are taking Tylenol in addition to a combination product please keep track of your daily acetaminophen dose to make sure you do not exceed the recommended 4000 mg.  Taking too much acetaminophen can cause permanent damage to your liver.    Return to the ER if any new or worsening symptoms develop including severe pain, vomiting, fever/chills, painful urination, blood clots in urine,  increasing back or abdominal pain, or any other concerning symptoms.

## 2023-09-18 LAB — BACTERIA UR CULT: NO GROWTH

## 2023-09-23 ENCOUNTER — HEALTH MAINTENANCE LETTER (OUTPATIENT)
Age: 33
End: 2023-09-23

## 2023-09-25 DIAGNOSIS — N20.1 URETEROLITHIASIS: ICD-10-CM

## 2023-09-29 DIAGNOSIS — N20.1 URETEROLITHIASIS: ICD-10-CM

## 2023-10-03 DIAGNOSIS — N20.1 URETEROLITHIASIS: ICD-10-CM

## 2023-12-12 RX ORDER — TAMSULOSIN HYDROCHLORIDE 0.4 MG/1
0.4 CAPSULE ORAL DAILY
Qty: 7 CAPSULE | Refills: 0 | OUTPATIENT
Start: 2023-12-12

## 2023-12-12 RX ORDER — TOLTERODINE 4 MG/1
CAPSULE, EXTENDED RELEASE ORAL
Qty: 7 CAPSULE | Refills: 0 | OUTPATIENT
Start: 2023-12-12

## 2023-12-24 RX ORDER — TAMSULOSIN HYDROCHLORIDE 0.4 MG/1
0.4 CAPSULE ORAL DAILY
Qty: 7 CAPSULE | Refills: 0 | OUTPATIENT
Start: 2023-12-24

## 2023-12-24 RX ORDER — TOLTERODINE 4 MG/1
CAPSULE, EXTENDED RELEASE ORAL
Qty: 7 CAPSULE | Refills: 0 | OUTPATIENT
Start: 2023-12-24

## 2023-12-28 RX ORDER — TAMSULOSIN HYDROCHLORIDE 0.4 MG/1
0.4 CAPSULE ORAL DAILY
Qty: 7 CAPSULE | Refills: 0 | OUTPATIENT
Start: 2023-12-28

## 2023-12-28 RX ORDER — TOLTERODINE 4 MG/1
CAPSULE, EXTENDED RELEASE ORAL
Qty: 7 CAPSULE | Refills: 0 | OUTPATIENT
Start: 2023-12-28

## 2024-01-01 RX ORDER — TOLTERODINE 4 MG/1
CAPSULE, EXTENDED RELEASE ORAL
Qty: 7 CAPSULE | Refills: 0 | OUTPATIENT
Start: 2024-01-01

## 2024-01-01 RX ORDER — TAMSULOSIN HYDROCHLORIDE 0.4 MG/1
0.4 CAPSULE ORAL DAILY
Qty: 7 CAPSULE | Refills: 0 | OUTPATIENT
Start: 2024-01-01
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
## 2024-02-19 ENCOUNTER — LAB REQUISITION (OUTPATIENT)
Dept: LAB | Facility: CLINIC | Age: 34
End: 2024-02-19
Payer: COMMERCIAL

## 2024-02-19 DIAGNOSIS — F41.9 ANXIETY DISORDER, UNSPECIFIED: ICD-10-CM

## 2024-02-19 DIAGNOSIS — F31.77 BIPOLAR DISORDER, IN PARTIAL REMISSION, MOST RECENT EPISODE MIXED (H): ICD-10-CM

## 2024-02-19 PROCEDURE — 80053 COMPREHEN METABOLIC PANEL: CPT | Mod: ORL | Performed by: FAMILY MEDICINE

## 2024-02-19 PROCEDURE — 84443 ASSAY THYROID STIM HORMONE: CPT | Mod: ORL | Performed by: FAMILY MEDICINE

## 2024-02-19 PROCEDURE — 80175 DRUG SCREEN QUAN LAMOTRIGINE: CPT | Mod: ORL | Performed by: FAMILY MEDICINE

## 2024-02-20 LAB
ALBUMIN SERPL BCG-MCNC: 4.7 G/DL (ref 3.5–5.2)
ALP SERPL-CCNC: 55 U/L (ref 40–150)
ALT SERPL W P-5'-P-CCNC: 9 U/L (ref 0–50)
ANION GAP SERPL CALCULATED.3IONS-SCNC: 14 MMOL/L (ref 7–15)
AST SERPL W P-5'-P-CCNC: 22 U/L (ref 0–45)
BILIRUB SERPL-MCNC: 0.2 MG/DL
BUN SERPL-MCNC: 16 MG/DL (ref 6–20)
CALCIUM SERPL-MCNC: 8.9 MG/DL (ref 8.6–10)
CHLORIDE SERPL-SCNC: 103 MMOL/L (ref 98–107)
CREAT SERPL-MCNC: 0.63 MG/DL (ref 0.51–0.95)
DEPRECATED HCO3 PLAS-SCNC: 21 MMOL/L (ref 22–29)
EGFRCR SERPLBLD CKD-EPI 2021: >90 ML/MIN/1.73M2
GLUCOSE SERPL-MCNC: 99 MG/DL (ref 70–99)
POTASSIUM SERPL-SCNC: 3.7 MMOL/L (ref 3.4–5.3)
PROT SERPL-MCNC: 7.2 G/DL (ref 6.4–8.3)
SODIUM SERPL-SCNC: 138 MMOL/L (ref 135–145)
TSH SERPL DL<=0.005 MIU/L-ACNC: 0.36 UIU/ML (ref 0.3–4.2)

## 2024-02-21 LAB — LAMOTRIGINE SERPL-MCNC: 0.9 UG/ML
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
## 2024-11-16 ENCOUNTER — HEALTH MAINTENANCE LETTER (OUTPATIENT)
Age: 34
End: 2024-11-16

## 2024-12-06 NOTE — OR NURSING
Problem: At Risk for Falls  Goal: Patient does not fall  Outcome: Monitoring/Evaluating progress  Goal: Patient takes action to control fall-related risks  Outcome: Monitoring/Evaluating progress     Problem: At Risk for Injury Due to Fall  Goal: Patient does not fall  Outcome: Monitoring/Evaluating progress  Goal: Takes action to control condition specific risks  Outcome: Monitoring/Evaluating progress  Goal: Verbalizes understanding of fall-related injury personal risks  Description: Document education using the patient education activity  Outcome: Monitoring/Evaluating progress     Problem: Pain  Goal: Acceptable pain level achieved/maintained at rest using appropriate pain scale for the patient  Outcome: Monitoring/Evaluating progress  Goal: Acceptable pain level achieved/maintained with activity using appropriate pain scale for the patient  Outcome: Monitoring/Evaluating progress  Goal: Acceptable pain level achieved/maintained without oversedation  Outcome: Monitoring/Evaluating progress     Problem: Impaired Physical Mobility  Goal: Functional status is maintained or returned to baseline during hospitalization  Outcome: Monitoring/Evaluating progress  Goal: Tolerates activity for discharge setting with no abnormal symptoms  Outcome: Monitoring/Evaluating progress     Problem: Breathing Pattern Ineffective  Goal: Air exchange is effective, demonstrated by Sp02 sat of greater then or = 92% (or as ordered)  Outcome: Monitoring/Evaluating progress  Goal: Respiratory pattern is quiet and regular without report of SOB  Outcome: Monitoring/Evaluating progress  Goal: Breathing pattern demonstrates minimal apnea during sleep with appropriate use of airway pressure support devices  Outcome: Monitoring/Evaluating progress  Goal: Verbalizes/demonstrates effective breathing management strategies  Description: Document education using the patient education activity.   Outcome: Monitoring/Evaluating progress  Goal:  Precedex given by anesthesia for migraine.  VSS.  Will monitor.   Minimize respiratory effort related to dyspnea/shortness of breath (Hospice)  Outcome: Monitoring/Evaluating progress     Problem: Pneumonia  Goal: S/S of acute pneumonia are resolved  Description: If acute pneumonia is present, monitor for resolution of fever, cough, secretions and other test values based on presentation.  Outcome: Monitoring/Evaluating progress  Goal: Verbalizes understanding of pneumonia, treatment, and ongoing prevention  Description: Document on Patient Education Activity  Outcome: Monitoring/Evaluating progress     Problem: Artificial Airway Management  Goal: # Maintains effective artificial airway  Outcome: Monitoring/Evaluating progress  Goal: # Maintains skin integrity around the airway  Outcome: Monitoring/Evaluating progress  Goal: # Tolerates Activity/ADL without s/s of intolerance  Description: Monitor patient tolerance of the artificial airway while they perform activities and ADLs include bathing, showering, shaving, and eating.  Outcome: Monitoring/Evaluating progress  Goal: Verbalizes understanding of artifical airway function and care  Description: Document on Patient Education Activity  Outcome: Monitoring/Evaluating progress  Goal: Demonstrates ability to manage Trach: site care, suctioning, trach diaz care & inner cannula care if needed.  Description: Document on Patient Education Activity    Outcome: Monitoring/Evaluating progress  Goal: Demonstrates ability to perform Trach cuff maintenance  Description: Document on Patient Education Activity    Outcome: Monitoring/Evaluating progress  Goal: Demonstrates ability to manage Laryngectomy: stoma care, suctioning, and humidification  Description: Document on Patient Education Activity  Outcome: Monitoring/Evaluating progress  Goal: Demonstrates ability to manage communication (speaking valve or cork insertion)  Description: Document on Patient Education Activity  Outcome: Monitoring/Evaluating progress     Problem: Postoperative  Care  Goal: Vital signs are maintained within parameters  Outcome: Monitoring/Evaluating progress  Goal: Elimination status is maintained/returned to baseline  Outcome: Monitoring/Evaluating progress  Goal: Oral intake is resumed and tolerated  Outcome: Monitoring/Evaluating progress  Goal: Activity level is resumed to level needed for d/c  Outcome: Monitoring/Evaluating progress  Goal: Verbalizes understanding of postoperative care in the hospital and after d/c  Description: Document on Patient Education Activity  Outcome: Monitoring/Evaluating progress

## 2025-02-01 ENCOUNTER — HOSPITAL ENCOUNTER (EMERGENCY)
Facility: CLINIC | Age: 35
Discharge: HOME | End: 2025-02-02
Attending: EMERGENCY MEDICINE | Admitting: EMERGENCY MEDICINE
Payer: COMMERCIAL

## 2025-02-01 DIAGNOSIS — S06.0X0A CONCUSSION WITHOUT LOSS OF CONSCIOUSNESS, INITIAL ENCOUNTER: ICD-10-CM

## 2025-02-01 DIAGNOSIS — R10.84 GENERALIZED ABDOMINAL PAIN: ICD-10-CM

## 2025-02-01 DIAGNOSIS — S00.83XA FACIAL CONTUSION, INITIAL ENCOUNTER: ICD-10-CM
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
## 2025-02-01 PROCEDURE — 84132 ASSAY OF SERUM POTASSIUM: CPT | Performed by: EMERGENCY MEDICINE

## 2025-02-01 PROCEDURE — 85730 THROMBOPLASTIN TIME PARTIAL: CPT | Performed by: EMERGENCY MEDICINE

## 2025-02-01 PROCEDURE — 85041 AUTOMATED RBC COUNT: CPT | Performed by: EMERGENCY MEDICINE

## 2025-02-01 PROCEDURE — 99285 EMERGENCY DEPT VISIT HI MDM: CPT

## 2025-02-01 PROCEDURE — 36415 COLL VENOUS BLD VENIPUNCTURE: CPT | Performed by: EMERGENCY MEDICINE

## 2025-02-01 PROCEDURE — 85004 AUTOMATED DIFF WBC COUNT: CPT | Performed by: EMERGENCY MEDICINE

## 2025-02-01 PROCEDURE — 82077 ASSAY SPEC XCP UR&BREATH IA: CPT | Performed by: EMERGENCY MEDICINE

## 2025-02-01 PROCEDURE — 82040 ASSAY OF SERUM ALBUMIN: CPT | Performed by: EMERGENCY MEDICINE

## 2025-02-01 PROCEDURE — 85610 PROTHROMBIN TIME: CPT | Performed by: EMERGENCY MEDICINE

## 2025-02-01 RX ORDER — LIDOCAINE 40 MG/G
CREAM TOPICAL
Status: DISCONTINUED | OUTPATIENT
Start: 2025-02-01 | End: 2025-02-02 | Stop reason: HOSPADM

## 2025-02-01 ASSESSMENT — COLUMBIA-SUICIDE SEVERITY RATING SCALE - C-SSRS
2. HAVE YOU ACTUALLY HAD ANY THOUGHTS OF KILLING YOURSELF IN THE PAST MONTH?: NO
6. HAVE YOU EVER DONE ANYTHING, STARTED TO DO ANYTHING, OR PREPARED TO DO ANYTHING TO END YOUR LIFE?: NO
1. IN THE PAST MONTH, HAVE YOU WISHED YOU WERE DEAD OR WISHED YOU COULD GO TO SLEEP AND NOT WAKE UP?: NO

## 2025-02-02 ENCOUNTER — APPOINTMENT (OUTPATIENT)
Dept: CT IMAGING | Facility: CLINIC | Age: 35
End: 2025-02-02
Attending: EMERGENCY MEDICINE
Payer: COMMERCIAL

## 2025-02-02 VITALS
HEART RATE: 67 BPM | BODY MASS INDEX: 16.83 KG/M2 | OXYGEN SATURATION: 98 % | SYSTOLIC BLOOD PRESSURE: 121 MMHG | WEIGHT: 95 LBS | RESPIRATION RATE: 18 BRPM | TEMPERATURE: 98.2 F | DIASTOLIC BLOOD PRESSURE: 80 MMHG | HEIGHT: 63 IN

## 2025-02-02 LAB
ALBUMIN SERPL BCG-MCNC: 4.5 G/DL (ref 3.5–5.2)
ALP SERPL-CCNC: 63 U/L (ref 40–150)
ALT SERPL W P-5'-P-CCNC: 13 U/L (ref 0–50)
ANION GAP SERPL CALCULATED.3IONS-SCNC: 10 MMOL/L (ref 7–15)
APTT PPP: 29 SECONDS (ref 22–38)
AST SERPL W P-5'-P-CCNC: 24 U/L (ref 0–45)
BASOPHILS # BLD AUTO: 0 10E3/UL (ref 0–0.2)
BASOPHILS NFR BLD AUTO: 1 %
BILIRUB SERPL-MCNC: 0.3 MG/DL
BUN SERPL-MCNC: 17.6 MG/DL (ref 6–20)
CALCIUM SERPL-MCNC: 9.1 MG/DL (ref 8.8–10.4)
CHLORIDE SERPL-SCNC: 103 MMOL/L (ref 98–107)
CREAT SERPL-MCNC: 0.67 MG/DL (ref 0.51–0.95)
EGFRCR SERPLBLD CKD-EPI 2021: >90 ML/MIN/1.73M2
EOSINOPHIL # BLD AUTO: 0.1 10E3/UL (ref 0–0.7)
EOSINOPHIL NFR BLD AUTO: 1 %
ERYTHROCYTE [DISTWIDTH] IN BLOOD BY AUTOMATED COUNT: 12.4 % (ref 10–15)
ETHANOL SERPL-MCNC: <0.01 G/DL
GLUCOSE SERPL-MCNC: 80 MG/DL (ref 70–99)
HCG SERPL QL: NEGATIVE
HCO3 SERPL-SCNC: 26 MMOL/L (ref 22–29)
HCT VFR BLD AUTO: 39.2 % (ref 35–47)
HGB BLD-MCNC: 13.4 G/DL (ref 11.7–15.7)
IMM GRANULOCYTES # BLD: 0 10E3/UL
IMM GRANULOCYTES NFR BLD: 0 %
INR PPP: 0.98 (ref 0.85–1.15)
LYMPHOCYTES # BLD AUTO: 3.2 10E3/UL (ref 0.8–5.3)
LYMPHOCYTES NFR BLD AUTO: 48 %
MCH RBC QN AUTO: 32.1 PG (ref 26.5–33)
MCHC RBC AUTO-ENTMCNC: 34.2 G/DL (ref 31.5–36.5)
MCV RBC AUTO: 94 FL (ref 78–100)
MONOCYTES # BLD AUTO: 0.6 10E3/UL (ref 0–1.3)
MONOCYTES NFR BLD AUTO: 10 %
NEUTROPHILS # BLD AUTO: 2.7 10E3/UL (ref 1.6–8.3)
NEUTROPHILS NFR BLD AUTO: 41 %
NRBC # BLD AUTO: 0 10E3/UL
NRBC BLD AUTO-RTO: 0 /100
PLATELET # BLD AUTO: 317 10E3/UL (ref 150–450)
POTASSIUM SERPL-SCNC: 3.5 MMOL/L (ref 3.4–5.3)
PROT SERPL-MCNC: 6.7 G/DL (ref 6.4–8.3)
RBC # BLD AUTO: 4.17 10E6/UL (ref 3.8–5.2)
SODIUM SERPL-SCNC: 139 MMOL/L (ref 135–145)
WBC # BLD AUTO: 6.6 10E3/UL (ref 4–11)

## 2025-02-02 PROCEDURE — 96374 THER/PROPH/DIAG INJ IV PUSH: CPT

## 2025-02-02 PROCEDURE — 258N000003 HC RX IP 258 OP 636: Performed by: EMERGENCY MEDICINE

## 2025-02-02 PROCEDURE — 36415 COLL VENOUS BLD VENIPUNCTURE: CPT | Performed by: EMERGENCY MEDICINE

## 2025-02-02 PROCEDURE — 250N000011 HC RX IP 250 OP 636: Performed by: EMERGENCY MEDICINE

## 2025-02-02 PROCEDURE — 74177 CT ABD & PELVIS W/CONTRAST: CPT

## 2025-02-02 PROCEDURE — 84703 CHORIONIC GONADOTROPIN ASSAY: CPT | Performed by: EMERGENCY MEDICINE

## 2025-02-02 PROCEDURE — 70450 CT HEAD/BRAIN W/O DYE: CPT

## 2025-02-02 PROCEDURE — 70486 CT MAXILLOFACIAL W/O DYE: CPT

## 2025-02-02 PROCEDURE — 96361 HYDRATE IV INFUSION ADD-ON: CPT

## 2025-02-02 RX ORDER — KETOROLAC TROMETHAMINE 15 MG/ML
15 INJECTION, SOLUTION INTRAMUSCULAR; INTRAVENOUS ONCE
Status: COMPLETED | OUTPATIENT
Start: 2025-02-02 | End: 2025-02-02

## 2025-02-02 RX ORDER — IOPAMIDOL 755 MG/ML
100 INJECTION, SOLUTION INTRAVASCULAR ONCE
Status: COMPLETED | OUTPATIENT
Start: 2025-02-02 | End: 2025-02-02

## 2025-02-02 RX ORDER — ONDANSETRON 4 MG/1
4 TABLET, ORALLY DISINTEGRATING ORAL ONCE
Status: COMPLETED | OUTPATIENT
Start: 2025-02-02 | End: 2025-02-02

## 2025-02-02 RX ADMIN — IOPAMIDOL 100 ML: 755 INJECTION, SOLUTION INTRAVENOUS at 01:18

## 2025-02-02 RX ADMIN — SODIUM CHLORIDE 1000 ML: 9 INJECTION, SOLUTION INTRAVENOUS at 00:48

## 2025-02-02 RX ADMIN — KETOROLAC TROMETHAMINE 15 MG: 15 INJECTION, SOLUTION INTRAMUSCULAR; INTRAVENOUS at 00:48

## 2025-02-02 RX ADMIN — ONDANSETRON 4 MG: 4 TABLET, ORALLY DISINTEGRATING ORAL at 00:42

## 2025-02-02 ASSESSMENT — ACTIVITIES OF DAILY LIVING (ADL)
ADLS_ACUITY_SCORE: 50
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
## 2025-04-22 ENCOUNTER — HOSPITAL ENCOUNTER (EMERGENCY)
Facility: CLINIC | Age: 35
Discharge: HOME | End: 2025-04-22
Attending: EMERGENCY MEDICINE | Admitting: EMERGENCY MEDICINE
Payer: COMMERCIAL

## 2025-04-22 VITALS
WEIGHT: 98 LBS | DIASTOLIC BLOOD PRESSURE: 55 MMHG | OXYGEN SATURATION: 98 % | HEIGHT: 63 IN | HEART RATE: 73 BPM | BODY MASS INDEX: 17.36 KG/M2 | SYSTOLIC BLOOD PRESSURE: 100 MMHG | TEMPERATURE: 97.9 F | RESPIRATION RATE: 17 BRPM

## 2025-04-22 DIAGNOSIS — J10.1 INFLUENZA B: Primary | ICD-10-CM

## 2025-04-22 DIAGNOSIS — S01.302A: ICD-10-CM

## 2025-04-22 DIAGNOSIS — H69.92 DYSFUNCTION OF LEFT EUSTACHIAN TUBE: ICD-10-CM

## 2025-04-22 LAB
FLUAV RNA SPEC QL NAA+PROBE: NEGATIVE
FLUBV RNA RESP QL NAA+PROBE: POSITIVE
RSV RNA SPEC NAA+PROBE: NEGATIVE
SARS-COV-2 RNA RESP QL NAA+PROBE: NEGATIVE

## 2025-04-22 PROCEDURE — 250N000009 HC RX 250: Performed by: EMERGENCY MEDICINE

## 2025-04-22 PROCEDURE — 250N000013 HC RX MED GY IP 250 OP 250 PS 637: Performed by: EMERGENCY MEDICINE

## 2025-04-22 PROCEDURE — 87637 SARSCOV2&INF A&B&RSV AMP PRB: CPT | Performed by: EMERGENCY MEDICINE

## 2025-04-22 PROCEDURE — 99283 EMERGENCY DEPT VISIT LOW MDM: CPT

## 2025-04-22 RX ORDER — OXYMETAZOLINE HYDROCHLORIDE 0.05 G/100ML
2 SPRAY NASAL ONCE
Status: COMPLETED | OUTPATIENT
Start: 2025-04-22 | End: 2025-04-22

## 2025-04-22 RX ORDER — FLUTICASONE PROPIONATE 50 MCG
1 SPRAY, SUSPENSION (ML) NASAL DAILY
Qty: 9.9 ML | Refills: 0 | Status: SHIPPED | OUTPATIENT
Start: 2025-04-22

## 2025-04-22 RX ORDER — PSEUDOEPHEDRINE HCL 120 MG/1
120 TABLET, FILM COATED, EXTENDED RELEASE ORAL EVERY 12 HOURS
Qty: 14 TABLET | Refills: 0 | Status: SHIPPED | OUTPATIENT
Start: 2025-04-22

## 2025-04-22 RX ORDER — IBUPROFEN 600 MG/1
600 TABLET, FILM COATED ORAL ONCE
Status: COMPLETED | OUTPATIENT
Start: 2025-04-22 | End: 2025-04-22

## 2025-04-22 RX ORDER — PSEUDOEPHEDRINE HCL 30 MG/1
30 TABLET, FILM COATED ORAL ONCE
Status: COMPLETED | OUTPATIENT
Start: 2025-04-22 | End: 2025-04-22

## 2025-04-22 RX ADMIN — PSEUDOEPHEDRINE HCL 30 MG: 30 TABLET, FILM COATED ORAL at 05:45

## 2025-04-22 RX ADMIN — OXYMETAZOLINE HYDROCHLORIDE 2 SPRAY: 0.5 SPRAY NASAL at 05:45

## 2025-04-22 RX ADMIN — IBUPROFEN 600 MG: 600 TABLET, FILM COATED ORAL at 05:45

## 2025-04-22 ASSESSMENT — ACTIVITIES OF DAILY LIVING (ADL): ADLS_ACUITY_SCORE: 50

## 2025-04-22 ASSESSMENT — COLUMBIA-SUICIDE SEVERITY RATING SCALE - C-SSRS
6. HAVE YOU EVER DONE ANYTHING, STARTED TO DO ANYTHING, OR PREPARED TO DO ANYTHING TO END YOUR LIFE?: NO
1. IN THE PAST MONTH, HAVE YOU WISHED YOU WERE DEAD OR WISHED YOU COULD GO TO SLEEP AND NOT WAKE UP?: NO
2. HAVE YOU ACTUALLY HAD ANY THOUGHTS OF KILLING YOURSELF IN THE PAST MONTH?: NO
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
## 2025-04-22 NOTE — ED NOTES
Introduced self to patient and family. Whiteboard updated. Bed is locked and in low position. Updated the pt with the plan of care. Pain reassessed. Call light within reach.     Ice pack provided per pt's request.

## 2025-04-22 NOTE — Clinical Note
Tara Sorenson was seen and treated in our emergency department on 4/22/2025.  She may return to work on 04/25/2025.       If you have any questions or concerns, please don't hesitate to call.      Jonathan Bush, RN

## 2025-04-22 NOTE — ED TRIAGE NOTES
Patient presents to the ED complaining of an earbud from headphones possibly being stuck in her left ear.  She is also complaining of a cough for the past week.  No medication prior to arrival.       Triage Assessment (Adult)       Row Name 04/22/25 0515          Triage Assessment    Airway WDL WDL        Respiratory WDL    Respiratory WDL WDL        Skin Circulation/Temperature WDL    Skin Circulation/Temperature WDL WDL        Cardiac WDL    Cardiac WDL WDL        Peripheral/Neurovascular WDL    Peripheral Neurovascular WDL WDL        Cognitive/Neuro/Behavioral WDL    Cognitive/Neuro/Behavioral WDL WDL        [place] Coma Scale    Best Eye Response 4-->(E4) spontaneous     Best Motor Response 6-->(M6) obeys commands     Best Verbal Response 5-->(V5) oriented     [place] Coma Scale Score 15     Assessment Qualifiers patient not sedated/intubated;no eye obstruction present

## 2025-04-22 NOTE — DISCHARGE INSTRUCTIONS
You do have influenza or the flu.  Use Flonase, Sudafed to help with eustachian tube dysfunction.  Tylenol, ibuprofen as needed for headaches, body aches, fever.

## (undated) DEVICE — KIT ENDO FIRST STEP DISINFECTANT 200ML W/POUCH EP-4

## (undated) DEVICE — ADAPTOR CHECK FLO 050805-TWSL

## (undated) DEVICE — WIRE GLIDE 0.035"X150CM 3CM ANG REG UWR6035

## (undated) DEVICE — SOL WATER IRRIG 3000ML BAG 2B7117

## (undated) DEVICE — SPONGE RAY-TEC 4X8" 7318

## (undated) DEVICE — CONNECTOR URETERAL CATH 140000

## (undated) DEVICE — CUSTOM PACK CYSTO PREFERRED SOT5BCYHEA

## (undated) DEVICE — SOLUTION IRRIG 2B7127 .9NS 3000ML BAG

## (undated) DEVICE — DILATATION SYSTEM URETERAL 6-16FR M0062401000

## (undated) DEVICE — MAT FLOOR SURGICAL 40X38 0702140238

## (undated) DEVICE — TUBING SUCTION MEDI-VAC 1/4"X20' N620A - HE

## (undated) DEVICE — DRSG TEGADERM 4X4 3/4" 1626W

## (undated) DEVICE — GUIDEWIRE SENSOR DUAL FLEX STR 0.035"X150CM M0066703080

## (undated) DEVICE — WIRE GUIDE 0.035"X145CM AMPLATZ SUPER STIFF STR M001465230

## (undated) DEVICE — TUBING SET THERMEDX UROLOGY SGL USE LL0006

## (undated) DEVICE — ADAPTER SCOPE UROLOK II LF M0067301400

## (undated) DEVICE — CATH URETERAL OPEN END 5FRX70CM M0064002010

## (undated) DEVICE — Device

## (undated) DEVICE — BASKET NITINOL TIPLESS HALO  1.5FRX120CM 554120

## (undated) DEVICE — CATH ANGIO JB1 SOFT-VU 5FRX65CM MARINER 11734101

## (undated) DEVICE — GUIDEWIRE BENTSON FLEX TIP 0.035"X150CM M0066201250

## (undated) DEVICE — PREP DYNA-HEX 4% CHG SCRUB 4OZ BOTTLE MDS098710

## (undated) DEVICE — GLOVE BIOGEL PI ULTRATOUCH G SZ 7.5 42175

## (undated) DEVICE — ADAPTER CATH CHECK-FLO 9FR FLL 050885 G15476

## (undated) DEVICE — SOL WATER IRRIG 1000ML BOTTLE 2F7114

## (undated) RX ORDER — FENTANYL CITRATE 50 UG/ML
INJECTION, SOLUTION INTRAMUSCULAR; INTRAVENOUS
Status: DISPENSED
Start: 2023-09-08

## (undated) RX ORDER — ONDANSETRON 2 MG/ML
INJECTION INTRAMUSCULAR; INTRAVENOUS
Status: DISPENSED
Start: 2023-09-08

## (undated) RX ORDER — DEXAMETHASONE SODIUM PHOSPHATE 10 MG/ML
INJECTION, SOLUTION INTRAMUSCULAR; INTRAVENOUS
Status: DISPENSED
Start: 2023-09-08